# Patient Record
Sex: MALE | Race: OTHER | Employment: FULL TIME | URBAN - METROPOLITAN AREA
[De-identification: names, ages, dates, MRNs, and addresses within clinical notes are randomized per-mention and may not be internally consistent; named-entity substitution may affect disease eponyms.]

---

## 2017-01-11 ENCOUNTER — OFFICE VISIT (OUTPATIENT)
Dept: FAMILY MEDICINE CLINIC | Age: 52
End: 2017-01-11

## 2017-01-11 VITALS
BODY MASS INDEX: 31.32 KG/M2 | HEART RATE: 62 BPM | SYSTOLIC BLOOD PRESSURE: 132 MMHG | WEIGHT: 200 LBS | DIASTOLIC BLOOD PRESSURE: 86 MMHG | OXYGEN SATURATION: 98 %

## 2017-01-11 DIAGNOSIS — H66.001 ACUTE SUPPURATIVE OTITIS MEDIA OF RIGHT EAR WITHOUT SPONTANEOUS RUPTURE OF TYMPANIC MEMBRANE, RECURRENCE NOT SPECIFIED: ICD-10-CM

## 2017-01-11 DIAGNOSIS — J06.9 PROTRACTED URI: ICD-10-CM

## 2017-01-11 DIAGNOSIS — R13.14 PHARYNGOESOPHAGEAL DYSPHAGIA: Primary | ICD-10-CM

## 2017-01-11 PROCEDURE — 99213 OFFICE O/P EST LOW 20 MIN: CPT | Performed by: FAMILY MEDICINE

## 2017-01-11 RX ORDER — CEFUROXIME AXETIL 250 MG/1
250 TABLET ORAL 2 TIMES DAILY
Qty: 20 TABLET | Refills: 0 | Status: SHIPPED | OUTPATIENT
Start: 2017-01-11 | End: 2017-01-21

## 2017-01-11 RX ORDER — FLUTICASONE PROPIONATE 50 MCG
2 SPRAY, SUSPENSION (ML) NASAL DAILY
Qty: 1 BOTTLE | Refills: 1 | Status: SHIPPED | OUTPATIENT
Start: 2017-01-11 | End: 2019-03-26 | Stop reason: SDUPTHER

## 2017-01-24 ENCOUNTER — OFFICE VISIT (OUTPATIENT)
Dept: SLEEP MEDICINE | Age: 52
End: 2017-01-24

## 2017-01-24 VITALS
SYSTOLIC BLOOD PRESSURE: 130 MMHG | HEART RATE: 61 BPM | HEIGHT: 67 IN | BODY MASS INDEX: 31.08 KG/M2 | OXYGEN SATURATION: 98 % | DIASTOLIC BLOOD PRESSURE: 80 MMHG | WEIGHT: 198 LBS

## 2017-01-24 DIAGNOSIS — J30.9 ALLERGIC RHINITIS, UNSPECIFIED ALLERGIC RHINITIS TRIGGER, UNSPECIFIED RHINITIS SEASONALITY: Chronic | ICD-10-CM

## 2017-01-24 DIAGNOSIS — E66.9 NON MORBID OBESITY, UNSPECIFIED OBESITY TYPE: Chronic | ICD-10-CM

## 2017-01-24 DIAGNOSIS — R06.83 SNORING: ICD-10-CM

## 2017-01-24 DIAGNOSIS — G47.10 HYPERSOMNIA: Primary | ICD-10-CM

## 2017-01-24 DIAGNOSIS — J45.20 MILD INTERMITTENT ASTHMA WITHOUT COMPLICATION: Chronic | ICD-10-CM

## 2017-01-24 DIAGNOSIS — E11.9 CONTROLLED TYPE 2 DIABETES MELLITUS WITHOUT COMPLICATION, WITHOUT LONG-TERM CURRENT USE OF INSULIN (HCC): Chronic | ICD-10-CM

## 2017-01-24 PROCEDURE — 99244 OFF/OP CNSLTJ NEW/EST MOD 40: CPT | Performed by: INTERNAL MEDICINE

## 2017-01-24 ASSESSMENT — SLEEP AND FATIGUE QUESTIONNAIRES
HOW LIKELY ARE YOU TO NOD OFF OR FALL ASLEEP WHILE SITTING AND READING: 3
HOW LIKELY ARE YOU TO NOD OFF OR FALL ASLEEP WHEN YOU ARE A PASSENGER IN A CAR FOR AN HOUR WITHOUT A BREAK: 0
HOW LIKELY ARE YOU TO NOD OFF OR FALL ASLEEP IN A CAR, WHILE STOPPED FOR A FEW MINUTES IN TRAFFIC: 0
ESS TOTAL SCORE: 9
HOW LIKELY ARE YOU TO NOD OFF OR FALL ASLEEP WHILE SITTING AND TALKING TO SOMEONE: 1
NECK CIRCUMFERENCE (INCHES): 16.5
HOW LIKELY ARE YOU TO NOD OFF OR FALL ASLEEP WHILE SITTING QUIETLY AFTER LUNCH WITHOUT ALCOHOL: 0
HOW LIKELY ARE YOU TO NOD OFF OR FALL ASLEEP WHILE LYING DOWN TO REST IN THE AFTERNOON WHEN CIRCUMSTANCES PERMIT: 0
HOW LIKELY ARE YOU TO NOD OFF OR FALL ASLEEP WHILE WATCHING TV: 3
HOW LIKELY ARE YOU TO NOD OFF OR FALL ASLEEP WHILE SITTING INACTIVE IN A PUBLIC PLACE: 2

## 2017-01-24 ASSESSMENT — ENCOUNTER SYMPTOMS
VOMITING: 0
CHOKING: 0
ABDOMINAL DISTENTION: 0
ALLERGIC/IMMUNOLOGIC NEGATIVE: 1
SHORTNESS OF BREATH: 0
NAUSEA: 0
ABDOMINAL PAIN: 0
PHOTOPHOBIA: 0
RHINORRHEA: 0
EYE PAIN: 0
CHEST TIGHTNESS: 0
APNEA: 1

## 2017-01-27 ENCOUNTER — OFFICE VISIT (OUTPATIENT)
Dept: FAMILY MEDICINE CLINIC | Age: 52
End: 2017-01-27

## 2017-01-27 VITALS
HEART RATE: 71 BPM | WEIGHT: 196 LBS | SYSTOLIC BLOOD PRESSURE: 136 MMHG | OXYGEN SATURATION: 98 % | DIASTOLIC BLOOD PRESSURE: 70 MMHG | BODY MASS INDEX: 30.7 KG/M2

## 2017-01-27 DIAGNOSIS — L03.011 PARONYCHIA OF RIGHT MIDDLE FINGER: Primary | ICD-10-CM

## 2017-01-27 PROCEDURE — 99213 OFFICE O/P EST LOW 20 MIN: CPT | Performed by: FAMILY MEDICINE

## 2017-01-27 RX ORDER — AMOXICILLIN AND CLAVULANATE POTASSIUM 500; 125 MG/1; MG/1
1 TABLET, FILM COATED ORAL 3 TIMES DAILY
Qty: 21 TABLET | Refills: 0 | Status: SHIPPED | OUTPATIENT
Start: 2017-01-27 | End: 2017-02-03

## 2017-02-15 ENCOUNTER — TELEPHONE (OUTPATIENT)
Dept: FAMILY MEDICINE CLINIC | Age: 52
End: 2017-02-15

## 2017-02-15 ENCOUNTER — TELEPHONE (OUTPATIENT)
Dept: ORTHOPEDIC SURGERY | Age: 52
End: 2017-02-15

## 2017-02-15 DIAGNOSIS — R52 PAIN: Primary | ICD-10-CM

## 2017-02-17 ENCOUNTER — HOSPITAL ENCOUNTER (OUTPATIENT)
Dept: SLEEP MEDICINE | Age: 52
Discharge: HOME | End: 2017-02-18
Attending: INTERNAL MEDICINE | Admitting: INTERNAL MEDICINE

## 2017-02-17 DIAGNOSIS — G47.10 HYPERSOMNIA: ICD-10-CM

## 2017-02-17 DIAGNOSIS — R06.83 SNORING: ICD-10-CM

## 2017-02-17 PROCEDURE — 95810 POLYSOM 6/> YRS 4/> PARAM: CPT | Performed by: INTERNAL MEDICINE

## 2017-02-21 ENCOUNTER — OFFICE VISIT (OUTPATIENT)
Dept: ORTHOPEDIC SURGERY | Age: 52
End: 2017-02-21

## 2017-02-21 ENCOUNTER — TELEPHONE (OUTPATIENT)
Dept: SLEEP MEDICINE | Age: 52
End: 2017-02-21

## 2017-02-21 VITALS
DIASTOLIC BLOOD PRESSURE: 80 MMHG | SYSTOLIC BLOOD PRESSURE: 131 MMHG | WEIGHT: 195.99 LBS | HEART RATE: 67 BPM | BODY MASS INDEX: 30.76 KG/M2 | HEIGHT: 67 IN

## 2017-02-21 DIAGNOSIS — L03.011 PARONYCHIA OF FINGER OF RIGHT HAND: ICD-10-CM

## 2017-02-21 PROCEDURE — 99213 OFFICE O/P EST LOW 20 MIN: CPT | Performed by: ORTHOPAEDIC SURGERY

## 2017-02-24 ENCOUNTER — HOSPITAL ENCOUNTER (OUTPATIENT)
Dept: SLEEP MEDICINE | Age: 52
Discharge: HOME | End: 2017-02-25
Attending: INTERNAL MEDICINE | Admitting: INTERNAL MEDICINE

## 2017-02-24 DIAGNOSIS — G47.10 HYPERSOMNIA: ICD-10-CM

## 2017-02-24 DIAGNOSIS — R06.83 SNORING: ICD-10-CM

## 2017-02-24 PROCEDURE — 95811 POLYSOM 6/>YRS CPAP 4/> PARM: CPT | Performed by: INTERNAL MEDICINE

## 2017-03-06 ENCOUNTER — TELEPHONE (OUTPATIENT)
Dept: FAMILY MEDICINE CLINIC | Age: 52
End: 2017-03-06

## 2017-03-06 DIAGNOSIS — Z12.11 COLON CANCER SCREENING: Primary | ICD-10-CM

## 2017-03-08 ENCOUNTER — TELEPHONE (OUTPATIENT)
Dept: FAMILY MEDICINE CLINIC | Age: 52
End: 2017-03-08

## 2017-03-14 ENCOUNTER — TELEPHONE (OUTPATIENT)
Dept: SLEEP MEDICINE | Age: 52
End: 2017-03-14

## 2017-03-15 ENCOUNTER — OFFICE VISIT (OUTPATIENT)
Dept: FAMILY MEDICINE CLINIC | Age: 52
End: 2017-03-15

## 2017-03-15 VITALS
HEART RATE: 78 BPM | HEIGHT: 67 IN | SYSTOLIC BLOOD PRESSURE: 132 MMHG | DIASTOLIC BLOOD PRESSURE: 82 MMHG | WEIGHT: 191 LBS | OXYGEN SATURATION: 98 % | BODY MASS INDEX: 29.98 KG/M2 | RESPIRATION RATE: 12 BRPM | TEMPERATURE: 98 F

## 2017-03-15 DIAGNOSIS — J06.9 VIRAL URI WITH COUGH: Primary | ICD-10-CM

## 2017-03-15 DIAGNOSIS — J45.21 MILD INTERMITTENT ASTHMA WITH ACUTE EXACERBATION: ICD-10-CM

## 2017-03-15 PROCEDURE — 99213 OFFICE O/P EST LOW 20 MIN: CPT | Performed by: NURSE PRACTITIONER

## 2017-03-15 RX ORDER — ALBUTEROL SULFATE 90 UG/1
2 AEROSOL, METERED RESPIRATORY (INHALATION) EVERY 6 HOURS PRN
Qty: 1 INHALER | Refills: 0 | Status: SHIPPED | OUTPATIENT
Start: 2017-03-15 | End: 2017-05-17 | Stop reason: ALTCHOICE

## 2017-03-15 RX ORDER — ALBUTEROL SULFATE 90 UG/1
2 AEROSOL, METERED RESPIRATORY (INHALATION) EVERY 6 HOURS PRN
Qty: 1 INHALER | Refills: 5 | Status: SHIPPED | OUTPATIENT
Start: 2017-03-15 | End: 2018-04-04 | Stop reason: SDUPTHER

## 2017-03-15 RX ORDER — PROMETHAZINE HYDROCHLORIDE AND CODEINE PHOSPHATE 6.25; 1 MG/5ML; MG/5ML
5 SYRUP ORAL 4 TIMES DAILY PRN
Qty: 120 ML | Refills: 0 | Status: SHIPPED | OUTPATIENT
Start: 2017-03-15 | End: 2017-03-22

## 2017-03-15 ASSESSMENT — ENCOUNTER SYMPTOMS
COUGH: 1
CHEST TIGHTNESS: 0
SORE THROAT: 0
SHORTNESS OF BREATH: 0
WHEEZING: 0
RHINORRHEA: 0
SINUS PRESSURE: 1

## 2017-03-15 ASSESSMENT — PATIENT HEALTH QUESTIONNAIRE - PHQ9
2. FEELING DOWN, DEPRESSED OR HOPELESS: 0
SUM OF ALL RESPONSES TO PHQ9 QUESTIONS 1 & 2: 0
1. LITTLE INTEREST OR PLEASURE IN DOING THINGS: 0
SUM OF ALL RESPONSES TO PHQ QUESTIONS 1-9: 0

## 2017-03-16 ENCOUNTER — TELEPHONE (OUTPATIENT)
Dept: FAMILY MEDICINE CLINIC | Age: 52
End: 2017-03-16

## 2017-03-17 ENCOUNTER — TELEPHONE (OUTPATIENT)
Dept: FAMILY MEDICINE CLINIC | Age: 52
End: 2017-03-17

## 2017-04-18 DIAGNOSIS — J45.21 MILD INTERMITTENT ASTHMA WITH ACUTE EXACERBATION: ICD-10-CM

## 2017-04-27 ENCOUNTER — TELEPHONE (OUTPATIENT)
Dept: FAMILY MEDICINE CLINIC | Age: 52
End: 2017-04-27

## 2017-04-27 RX ORDER — BLOOD PRESSURE TEST KIT
KIT MISCELLANEOUS
Qty: 1 KIT | Refills: 0 | Status: SHIPPED | OUTPATIENT
Start: 2017-04-27

## 2017-05-02 ENCOUNTER — OFFICE VISIT (OUTPATIENT)
Dept: ORTHOPEDIC SURGERY | Age: 52
End: 2017-05-02

## 2017-05-02 VITALS — WEIGHT: 189 LBS | HEIGHT: 68 IN | BODY MASS INDEX: 28.64 KG/M2

## 2017-05-02 DIAGNOSIS — L03.011 PARONYCHIA OF FINGER OF RIGHT HAND: Primary | ICD-10-CM

## 2017-05-02 DIAGNOSIS — M79.609 PAIN DUE TO ONYCHOMYCOSIS OF NAIL: ICD-10-CM

## 2017-05-02 DIAGNOSIS — B35.1 PAIN DUE TO ONYCHOMYCOSIS OF NAIL: ICD-10-CM

## 2017-05-02 PROCEDURE — 99213 OFFICE O/P EST LOW 20 MIN: CPT | Performed by: ORTHOPAEDIC SURGERY

## 2017-05-11 ENCOUNTER — TELEPHONE (OUTPATIENT)
Dept: FAMILY MEDICINE CLINIC | Age: 52
End: 2017-05-11

## 2017-05-16 ENCOUNTER — OFFICE VISIT (OUTPATIENT)
Dept: SLEEP MEDICINE | Age: 52
End: 2017-05-16

## 2017-05-16 VITALS
DIASTOLIC BLOOD PRESSURE: 74 MMHG | WEIGHT: 194 LBS | SYSTOLIC BLOOD PRESSURE: 122 MMHG | HEIGHT: 68 IN | HEART RATE: 73 BPM | OXYGEN SATURATION: 97 % | BODY MASS INDEX: 29.4 KG/M2

## 2017-05-16 DIAGNOSIS — J45.20 MILD INTERMITTENT ASTHMA WITHOUT COMPLICATION: Chronic | ICD-10-CM

## 2017-05-16 DIAGNOSIS — G47.33 OBSTRUCTIVE SLEEP APNEA SYNDROME: Primary | ICD-10-CM

## 2017-05-16 DIAGNOSIS — E11.9 CONTROLLED TYPE 2 DIABETES MELLITUS WITHOUT COMPLICATION, WITHOUT LONG-TERM CURRENT USE OF INSULIN (HCC): Chronic | ICD-10-CM

## 2017-05-16 DIAGNOSIS — J30.9 ALLERGIC RHINITIS, UNSPECIFIED ALLERGIC RHINITIS TRIGGER, UNSPECIFIED RHINITIS SEASONALITY: Chronic | ICD-10-CM

## 2017-05-16 PROCEDURE — 99214 OFFICE O/P EST MOD 30 MIN: CPT | Performed by: NURSE PRACTITIONER

## 2017-05-16 ASSESSMENT — ENCOUNTER SYMPTOMS
ABDOMINAL PAIN: 0
RHINORRHEA: 0
SHORTNESS OF BREATH: 0
SINUS PRESSURE: 0
COUGH: 0
ABDOMINAL DISTENTION: 0
APNEA: 0

## 2017-05-16 ASSESSMENT — SLEEP AND FATIGUE QUESTIONNAIRES
HOW LIKELY ARE YOU TO NOD OFF OR FALL ASLEEP WHILE WATCHING TV: 2
HOW LIKELY ARE YOU TO NOD OFF OR FALL ASLEEP IN A CAR, WHILE STOPPED FOR A FEW MINUTES IN TRAFFIC: 0
HOW LIKELY ARE YOU TO NOD OFF OR FALL ASLEEP WHEN YOU ARE A PASSENGER IN A CAR FOR AN HOUR WITHOUT A BREAK: 0
HOW LIKELY ARE YOU TO NOD OFF OR FALL ASLEEP WHILE SITTING QUIETLY AFTER LUNCH WITHOUT ALCOHOL: 1
HOW LIKELY ARE YOU TO NOD OFF OR FALL ASLEEP WHILE LYING DOWN TO REST IN THE AFTERNOON WHEN CIRCUMSTANCES PERMIT: 1
ESS TOTAL SCORE: 7
HOW LIKELY ARE YOU TO NOD OFF OR FALL ASLEEP WHILE SITTING INACTIVE IN A PUBLIC PLACE: 1
HOW LIKELY ARE YOU TO NOD OFF OR FALL ASLEEP WHILE SITTING AND TALKING TO SOMEONE: 0
HOW LIKELY ARE YOU TO NOD OFF OR FALL ASLEEP WHILE SITTING AND READING: 2

## 2017-05-17 ENCOUNTER — TELEPHONE (OUTPATIENT)
Dept: ORTHOPEDIC SURGERY | Age: 52
End: 2017-05-17

## 2017-05-17 ENCOUNTER — OFFICE VISIT (OUTPATIENT)
Dept: FAMILY MEDICINE CLINIC | Age: 52
End: 2017-05-17

## 2017-05-17 ENCOUNTER — HOSPITAL ENCOUNTER (OUTPATIENT)
Dept: ENDOSCOPY | Age: 52
Discharge: HOME | End: 2017-05-17
Attending: INTERNAL MEDICINE | Admitting: INTERNAL MEDICINE

## 2017-05-17 VITALS
HEART RATE: 52 BPM | OXYGEN SATURATION: 98 % | RESPIRATION RATE: 14 BRPM | WEIGHT: 188.6 LBS | HEIGHT: 68 IN | SYSTOLIC BLOOD PRESSURE: 132 MMHG | DIASTOLIC BLOOD PRESSURE: 88 MMHG | BODY MASS INDEX: 28.58 KG/M2

## 2017-05-17 DIAGNOSIS — Z01.818 PREOP EXAMINATION: Primary | ICD-10-CM

## 2017-05-17 LAB
GLUCOSE BLD-MCNC: 104 MG/DL (ref 70–99)
GLUCOSE BLD-MCNC: 116 MG/DL (ref 70–99)
PERFORMED ON: ABNORMAL
PERFORMED ON: ABNORMAL

## 2017-05-17 PROCEDURE — 99242 OFF/OP CONSLTJ NEW/EST SF 20: CPT | Performed by: FAMILY MEDICINE

## 2017-05-17 PROCEDURE — 93000 ELECTROCARDIOGRAM COMPLETE: CPT | Performed by: FAMILY MEDICINE

## 2017-05-18 ENCOUNTER — TELEPHONE (OUTPATIENT)
Dept: PULMONOLOGY | Age: 52
End: 2017-05-18

## 2017-05-18 ENCOUNTER — TELEPHONE (OUTPATIENT)
Dept: SLEEP MEDICINE | Age: 52
End: 2017-05-18

## 2017-05-24 LAB — GLUCOSE BLD-MCNC: 128 MG/DL (ref 65–99)

## 2017-05-25 ENCOUNTER — TELEPHONE (OUTPATIENT)
Dept: ORTHOPEDIC SURGERY | Age: 52
End: 2017-05-25

## 2017-05-25 NOTE — LETTER
May 25, 2017     Patient: Erickson Salmon   YOB: 1965   Date of Surgery: 5/24/17 right hand       To Whom It May Concern: It is my medical opinion that Gabriela Collins should remain out of work until 5/27/17. If you have any questions or concerns, please don't hesitate to call. Sincerely,              Yaquelin Camargo.  Diana Dumont MD

## 2017-05-27 LAB
AMPICILLIN + SULBACTAM: ABNORMAL
AMPICILLIN: <=2
BACTERIA IDENTIFIED: ABNORMAL
CEFAZOLIN: <=8
CEFEPIME: <=4
CEFTRIAXONE: <=8
GENTAMICIN: <=4
LEVOFLOXACIN: <=2
Lab: <=4
MEROPENEM: <=1
MICROSCOPIC OBSERVATION: ABNORMAL
PIPERACILLIN + TAZOBACTAM: <=16
TOBRAMYCIN: <=4
TRIMETHOPRIM: ABNORMAL
VANCOMYCIN: 2

## 2017-05-30 ENCOUNTER — OFFICE VISIT (OUTPATIENT)
Dept: ORTHOPEDIC SURGERY | Age: 52
End: 2017-05-30

## 2017-05-30 ENCOUNTER — TELEPHONE (OUTPATIENT)
Dept: SLEEP MEDICINE | Age: 52
End: 2017-05-30

## 2017-05-30 VITALS
HEIGHT: 68 IN | DIASTOLIC BLOOD PRESSURE: 89 MMHG | WEIGHT: 190 LBS | HEART RATE: 71 BPM | SYSTOLIC BLOOD PRESSURE: 137 MMHG | BODY MASS INDEX: 28.79 KG/M2

## 2017-05-30 DIAGNOSIS — L03.011 PARONYCHIA OF FINGER OF RIGHT HAND: Primary | ICD-10-CM

## 2017-05-30 LAB — CLINICAL REPORT: NORMAL

## 2017-05-30 PROCEDURE — 99024 POSTOP FOLLOW-UP VISIT: CPT | Performed by: ORTHOPAEDIC SURGERY

## 2017-06-20 ENCOUNTER — TELEPHONE (OUTPATIENT)
Dept: FAMILY MEDICINE CLINIC | Age: 52
End: 2017-06-20

## 2017-06-26 ENCOUNTER — HOSPITAL ENCOUNTER (OUTPATIENT)
Dept: OTHER | Age: 52
Discharge: HOME | End: 2017-06-26
Attending: FAMILY MEDICINE | Admitting: FAMILY MEDICINE

## 2017-06-26 DIAGNOSIS — E78.00 PURE HYPERCHOLESTEROLEMIA: Chronic | ICD-10-CM

## 2017-06-26 DIAGNOSIS — E11.9 CONTROLLED TYPE 2 DIABETES MELLITUS WITHOUT COMPLICATION, WITHOUT LONG-TERM CURRENT USE OF INSULIN (HCC): Primary | Chronic | ICD-10-CM

## 2017-06-26 DIAGNOSIS — E11.9 CONTROLLED TYPE 2 DIABETES MELLITUS WITHOUT COMPLICATION, WITHOUT LONG-TERM CURRENT USE OF INSULIN (HCC): Chronic | ICD-10-CM

## 2017-06-26 DIAGNOSIS — Z12.5 SCREENING FOR PROSTATE CANCER: ICD-10-CM

## 2017-06-26 LAB
ANION GAP SERPL CALCULATED.3IONS-SCNC: 13 MMOL/L (ref 3–16)
BUN BLDV-MCNC: 11 MG/DL (ref 7–20)
CALCIUM SERPL-MCNC: 9.1 MG/DL (ref 8.3–10.6)
CHLORIDE BLD-SCNC: 103 MMOL/L (ref 99–110)
CHOLESTEROL, TOTAL: 189 MG/DL (ref 0–199)
CO2: 27 MMOL/L (ref 21–32)
CREAT SERPL-MCNC: 0.7 MG/DL (ref 0.9–1.3)
ESTIMATED AVERAGE GLUCOSE: 131.2 MG/DL
GFR AFRICAN AMERICAN: >60
GFR NON-AFRICAN AMERICAN: >60
GLUCOSE BLD-MCNC: 112 MG/DL (ref 70–99)
HBA1C MFR BLD: 6.2 %
HDLC SERPL-MCNC: 44 MG/DL (ref 40–60)
LDL CHOLESTEROL CALCULATED: 113 MG/DL
POTASSIUM SERPL-SCNC: 4.4 MMOL/L (ref 3.5–5.1)
PROSTATE SPECIFIC ANTIGEN: 0.41 NG/ML (ref 0–4)
SODIUM BLD-SCNC: 143 MMOL/L (ref 136–145)
TRIGL SERPL-MCNC: 162 MG/DL (ref 0–150)
VLDLC SERPL CALC-MCNC: 32 MG/DL

## 2017-06-29 ENCOUNTER — OFFICE VISIT (OUTPATIENT)
Dept: FAMILY MEDICINE CLINIC | Age: 52
End: 2017-06-29

## 2017-06-29 VITALS
HEART RATE: 59 BPM | OXYGEN SATURATION: 98 % | BODY MASS INDEX: 28.76 KG/M2 | WEIGHT: 189.8 LBS | HEIGHT: 68 IN | DIASTOLIC BLOOD PRESSURE: 68 MMHG | SYSTOLIC BLOOD PRESSURE: 116 MMHG

## 2017-06-29 DIAGNOSIS — J45.20 MILD INTERMITTENT ASTHMA WITHOUT COMPLICATION: Chronic | ICD-10-CM

## 2017-06-29 DIAGNOSIS — E11.9 CONTROLLED TYPE 2 DIABETES MELLITUS WITHOUT COMPLICATION, WITHOUT LONG-TERM CURRENT USE OF INSULIN (HCC): Primary | Chronic | ICD-10-CM

## 2017-06-29 DIAGNOSIS — E78.00 PURE HYPERCHOLESTEROLEMIA: Chronic | ICD-10-CM

## 2017-06-29 DIAGNOSIS — G47.33 OSA (OBSTRUCTIVE SLEEP APNEA): ICD-10-CM

## 2017-06-29 PROCEDURE — 99214 OFFICE O/P EST MOD 30 MIN: CPT | Performed by: NURSE PRACTITIONER

## 2017-06-29 RX ORDER — ATORVASTATIN CALCIUM 20 MG/1
20 TABLET, FILM COATED ORAL DAILY
Qty: 30 TABLET | Refills: 5 | Status: SHIPPED | OUTPATIENT
Start: 2017-06-29 | End: 2018-02-28

## 2017-07-11 DIAGNOSIS — E11.9 CONTROLLED TYPE 2 DIABETES MELLITUS WITHOUT COMPLICATION, WITHOUT LONG-TERM CURRENT USE OF INSULIN (HCC): Primary | Chronic | ICD-10-CM

## 2017-07-12 ENCOUNTER — OFFICE VISIT (OUTPATIENT)
Dept: ORTHOPEDIC SURGERY | Age: 52
End: 2017-07-12

## 2017-07-12 VITALS
WEIGHT: 189 LBS | DIASTOLIC BLOOD PRESSURE: 69 MMHG | BODY MASS INDEX: 28.64 KG/M2 | SYSTOLIC BLOOD PRESSURE: 117 MMHG | HEIGHT: 68 IN | RESPIRATION RATE: 16 BRPM | HEART RATE: 62 BPM

## 2017-07-12 DIAGNOSIS — M18.12 PRIMARY OSTEOARTHRITIS OF FIRST CARPOMETACARPAL JOINT OF LEFT HAND: Primary | ICD-10-CM

## 2017-07-12 PROCEDURE — 99213 OFFICE O/P EST LOW 20 MIN: CPT | Performed by: ORTHOPAEDIC SURGERY

## 2017-07-12 PROCEDURE — L3923 HFO WITHOUT JOINTS PRE CST: HCPCS | Performed by: ORTHOPAEDIC SURGERY

## 2017-07-12 PROCEDURE — 73130 X-RAY EXAM OF HAND: CPT | Performed by: ORTHOPAEDIC SURGERY

## 2017-07-12 RX ORDER — SIMVASTATIN 10 MG
TABLET ORAL
COMMUNITY
Start: 2017-05-14 | End: 2018-05-19

## 2017-07-12 RX ORDER — OMEPRAZOLE 20 MG/1
CAPSULE, DELAYED RELEASE ORAL
COMMUNITY
Start: 2017-05-17 | End: 2019-03-22 | Stop reason: ALTCHOICE

## 2017-07-12 RX ORDER — IBUPROFEN 200 MG
200 TABLET ORAL EVERY 6 HOURS PRN
COMMUNITY
End: 2018-08-01

## 2017-08-01 ENCOUNTER — TELEPHONE (OUTPATIENT)
Dept: FAMILY MEDICINE CLINIC | Age: 52
End: 2017-08-01

## 2017-08-04 ENCOUNTER — TELEPHONE (OUTPATIENT)
Dept: FAMILY MEDICINE CLINIC | Age: 52
End: 2017-08-04

## 2017-08-08 ENCOUNTER — HOSPITAL ENCOUNTER (OUTPATIENT)
Dept: OTHER | Age: 52
Discharge: HOME | End: 2017-08-08
Attending: INTERNAL MEDICINE | Admitting: INTERNAL MEDICINE

## 2017-08-09 LAB — H PYLORI ANTIGEN STOOL: NEGATIVE

## 2017-08-15 ENCOUNTER — OFFICE VISIT (OUTPATIENT)
Dept: SLEEP MEDICINE | Age: 52
End: 2017-08-15

## 2017-08-15 VITALS
OXYGEN SATURATION: 99 % | HEIGHT: 68 IN | DIASTOLIC BLOOD PRESSURE: 78 MMHG | WEIGHT: 193 LBS | BODY MASS INDEX: 29.25 KG/M2 | HEART RATE: 63 BPM | SYSTOLIC BLOOD PRESSURE: 118 MMHG

## 2017-08-15 DIAGNOSIS — J45.20 MILD INTERMITTENT ASTHMA WITHOUT COMPLICATION: Chronic | ICD-10-CM

## 2017-08-15 DIAGNOSIS — J30.9 ALLERGIC RHINITIS, UNSPECIFIED ALLERGIC RHINITIS TRIGGER, UNSPECIFIED RHINITIS SEASONALITY: Chronic | ICD-10-CM

## 2017-08-15 DIAGNOSIS — E11.9 CONTROLLED TYPE 2 DIABETES MELLITUS WITHOUT COMPLICATION, WITHOUT LONG-TERM CURRENT USE OF INSULIN (HCC): Chronic | ICD-10-CM

## 2017-08-15 DIAGNOSIS — E66.9 NON MORBID OBESITY, UNSPECIFIED OBESITY TYPE: Chronic | ICD-10-CM

## 2017-08-15 DIAGNOSIS — G47.33 OBSTRUCTIVE SLEEP APNEA SYNDROME: Primary | Chronic | ICD-10-CM

## 2017-08-15 PROCEDURE — 99214 OFFICE O/P EST MOD 30 MIN: CPT | Performed by: NURSE PRACTITIONER

## 2017-08-15 ASSESSMENT — SLEEP AND FATIGUE QUESTIONNAIRES
HOW LIKELY ARE YOU TO NOD OFF OR FALL ASLEEP IN A CAR, WHILE STOPPED FOR A FEW MINUTES IN TRAFFIC: 0
HOW LIKELY ARE YOU TO NOD OFF OR FALL ASLEEP WHEN YOU ARE A PASSENGER IN A CAR FOR AN HOUR WITHOUT A BREAK: 0
HOW LIKELY ARE YOU TO NOD OFF OR FALL ASLEEP WHILE WATCHING TV: 2
ESS TOTAL SCORE: 6
HOW LIKELY ARE YOU TO NOD OFF OR FALL ASLEEP WHILE SITTING QUIETLY AFTER LUNCH WITHOUT ALCOHOL: 0
HOW LIKELY ARE YOU TO NOD OFF OR FALL ASLEEP WHILE SITTING INACTIVE IN A PUBLIC PLACE: 1
HOW LIKELY ARE YOU TO NOD OFF OR FALL ASLEEP WHILE SITTING AND TALKING TO SOMEONE: 0
HOW LIKELY ARE YOU TO NOD OFF OR FALL ASLEEP WHILE SITTING AND READING: 2
HOW LIKELY ARE YOU TO NOD OFF OR FALL ASLEEP WHILE LYING DOWN TO REST IN THE AFTERNOON WHEN CIRCUMSTANCES PERMIT: 1

## 2017-08-15 ASSESSMENT — ENCOUNTER SYMPTOMS
SHORTNESS OF BREATH: 0
ABDOMINAL DISTENTION: 0
COUGH: 0
RHINORRHEA: 0
APNEA: 0
SINUS PRESSURE: 0
ABDOMINAL PAIN: 0

## 2017-11-22 RX ORDER — LANCETS 28 GAUGE
EACH MISCELLANEOUS
Qty: 100 EACH | Refills: 4 | Status: SHIPPED | OUTPATIENT
Start: 2017-11-22 | End: 2018-04-13 | Stop reason: SDUPTHER

## 2017-11-22 NOTE — TELEPHONE ENCOUNTER
Last OV  8/15/17    Last refill   4/12/16    # 100      R 5  Lab Results   Component Value Date    LABA1C 6.2 06/26/2017     Lab Results   Component Value Date    .2 06/26/2017

## 2017-12-15 DIAGNOSIS — E11.9 DIABETES TYPE 2, CONTROLLED (HCC): ICD-10-CM

## 2017-12-15 RX ORDER — LISINOPRIL 5 MG/1
TABLET ORAL
Qty: 30 TABLET | Refills: 10 | Status: SHIPPED | OUTPATIENT
Start: 2017-12-15 | End: 2018-02-28

## 2017-12-15 NOTE — TELEPHONE ENCOUNTER
Last OV  6/29/17    Last refill   12/8/16    # 30      R 11  Lab Results   Component Value Date     06/26/2017    K 4.4 06/26/2017     06/26/2017    CO2 27 06/26/2017    BUN 11 06/26/2017    CREATININE 0.7 (L) 06/26/2017    GLUCOSE 112 (H) 06/26/2017    CALCIUM 9.1 06/26/2017    PROT 6.7 06/09/2016    LABALBU 4.2 06/09/2016    BILITOT 0.6 06/09/2016    ALKPHOS 74 06/09/2016    AST 15 06/09/2016    ALT 18 06/09/2016    LABGLOM >60 06/26/2017    GFRAA >60 06/26/2017    AGRATIO 1.7 06/09/2016    GLOB 2.5 06/09/2016

## 2018-01-03 ENCOUNTER — OFFICE VISIT (OUTPATIENT)
Dept: FAMILY MEDICINE CLINIC | Age: 53
End: 2018-01-03

## 2018-01-03 VITALS
DIASTOLIC BLOOD PRESSURE: 94 MMHG | RESPIRATION RATE: 14 BRPM | BODY MASS INDEX: 29.24 KG/M2 | SYSTOLIC BLOOD PRESSURE: 134 MMHG | HEIGHT: 68 IN | HEART RATE: 56 BPM | WEIGHT: 192.9 LBS | OXYGEN SATURATION: 99 %

## 2018-01-03 DIAGNOSIS — E11.9 DIABETES MELLITUS WITHOUT COMPLICATION (HCC): ICD-10-CM

## 2018-01-03 DIAGNOSIS — J45.20 MILD INTERMITTENT ASTHMA WITHOUT COMPLICATION: Chronic | ICD-10-CM

## 2018-01-03 DIAGNOSIS — G47.33 OSA (OBSTRUCTIVE SLEEP APNEA): ICD-10-CM

## 2018-01-03 DIAGNOSIS — E78.00 PURE HYPERCHOLESTEROLEMIA: Primary | Chronic | ICD-10-CM

## 2018-01-03 PROCEDURE — 1036F TOBACCO NON-USER: CPT | Performed by: FAMILY MEDICINE

## 2018-01-03 PROCEDURE — G8427 DOCREV CUR MEDS BY ELIG CLIN: HCPCS | Performed by: FAMILY MEDICINE

## 2018-01-03 PROCEDURE — 3046F HEMOGLOBIN A1C LEVEL >9.0%: CPT | Performed by: FAMILY MEDICINE

## 2018-01-03 PROCEDURE — 99214 OFFICE O/P EST MOD 30 MIN: CPT | Performed by: FAMILY MEDICINE

## 2018-01-03 PROCEDURE — G8417 CALC BMI ABV UP PARAM F/U: HCPCS | Performed by: FAMILY MEDICINE

## 2018-01-03 PROCEDURE — G8484 FLU IMMUNIZE NO ADMIN: HCPCS | Performed by: FAMILY MEDICINE

## 2018-01-03 PROCEDURE — 3017F COLORECTAL CA SCREEN DOC REV: CPT | Performed by: FAMILY MEDICINE

## 2018-01-03 RX ORDER — SIMVASTATIN 20 MG
20 TABLET ORAL EVERY EVENING
Qty: 30 TABLET | Refills: 5 | Status: SHIPPED | OUTPATIENT
Start: 2018-01-03 | End: 2018-11-17 | Stop reason: SDUPTHER

## 2018-01-03 RX ORDER — LISINOPRIL 10 MG/1
10 TABLET ORAL DAILY
Qty: 30 TABLET | Refills: 5 | Status: SHIPPED | OUTPATIENT
Start: 2018-01-03 | End: 2018-08-07 | Stop reason: SDUPTHER

## 2018-01-03 NOTE — PROGRESS NOTES
Keturah Martinez is a 46 y.o. male. HPI: Here with wife for complex medical visit. Has been having left hemicranial headaches off-and-on for last several days and weeks  More forgetful, forgetting things that he told someone and forgetting things that he's been told to do  Has been less active and drinking more alcohol over the holidays  Denies any paresthesias open sores in his feet  Has resumed exercising just this past week   needs a new glucometer  Meds, vitamins and allergies reviewed with pt    ROS: No TIA's or unusual headaches, no dysphagia. No prolonged cough. No dyspnea or chest pain on exertion. No abdominal pain, change in bowel habits, black or bloody stools. No urinary tract symptoms. No new or unusual musculoskeletal symptoms. Prior to Visit Medications    Medication Sig Taking? Authorizing Provider   lisinopril (PRINIVIL;ZESTRIL) 5 MG tablet TAKE 1 TABLET BY MOUTH ONE TIME A DAY  Yes Carlos Kauffman MD   FREESTYLE LANCETS MISC USE ONE TIME DAILY Yes Carlos Kauffman MD   ibuprofen (ADVIL;MOTRIN) 200 MG tablet Take 200 mg by mouth every 6 hours as needed for Pain Yes Historical Provider, MD   omeprazole (PRILOSEC) 20 MG delayed release capsule  Yes Historical Provider, MD   glucose blood VI test strips (FREESTYLE LITE) strip 1 each by In Vitro route 2 times daily Yes Carlos Kauffman MD   atorvastatin (LIPITOR) 20 MG tablet Take 1 tablet by mouth daily Yes Darrel Green CNP   Blood Pressure KIT Please dispense one BP kit Yes Darrel Green CNP   albuterol sulfate HFA (VENTOLIN HFA) 108 (90 BASE) MCG/ACT inhaler Inhale 2 puffs into the lungs every 6 hours as needed for Wheezing Yes Carlos Kauffman MD   UNABLE TO FIND Per patient He is not on any OTC medication at this time.  Yes Historical Provider, MD   fluticasone (FLONASE) 50 MCG/ACT nasal spray 2 sprays by Nasal route daily Yes Shelby Bettencourt MD   metFORMIN (GLUCOPHAGE) 500 MG tablet TAKE 1 TABLET BY MOUTH TWO TIMES A DAY Results   Component Value Date    CHOL 189 06/26/2017    CHOL 162 06/09/2016    CHOL 173 12/02/2015     Lab Results   Component Value Date    TRIG 162 (H) 06/26/2017    TRIG 182 (H) 06/09/2016    TRIG 169 (H) 12/02/2015     Lab Results   Component Value Date    HDL 44 06/26/2017    HDL 38 (L) 06/09/2016    HDL 43 12/02/2015     Lab Results   Component Value Date    LDLCALC 113 (H) 06/26/2017    LDLCALC 88 06/09/2016    LDLCALC 96 12/02/2015     Lab Results   Component Value Date    LABVLDL 32 06/26/2017    LABVLDL 36 06/09/2016    LABVLDL 34 12/02/2015     No results found for: CHOLHDLRATIO     A1c 6.7  ASSESSMENT/PLAN:  1. Pure hypercholesterolemia  Will stop Lipitor and go back to Zocor which she tolerated better    2. Mild intermittent asthma without complication  Stable not addressed today    3. RENA (obstructive sleep apnea)  And thin the CPAP and exercising    4.  Diabetes mellitus without complication ([facility] [address]  S5S is up today but he thinks he can control his diet and exercise we'll make no medication changes bring back in 3-4 months  Reminded him to get an eye exam yearly  Provided him with a free glucometer sample    #5 hypertension borderline today  We'll increase lisinopril from 5-10    Spent 30 minutes with patient and his wife, greater than 50% of time discussing healthy lifestyle and corning his care  Return to office in 3 months at that time we will repeat blood work

## 2018-02-28 ENCOUNTER — OFFICE VISIT (OUTPATIENT)
Dept: FAMILY MEDICINE CLINIC | Age: 53
End: 2018-02-28

## 2018-02-28 VITALS
HEART RATE: 59 BPM | OXYGEN SATURATION: 98 % | WEIGHT: 192.2 LBS | SYSTOLIC BLOOD PRESSURE: 120 MMHG | BODY MASS INDEX: 29.23 KG/M2 | TEMPERATURE: 97.9 F | DIASTOLIC BLOOD PRESSURE: 80 MMHG | RESPIRATION RATE: 16 BRPM

## 2018-02-28 DIAGNOSIS — J04.0 LARYNGITIS: ICD-10-CM

## 2018-02-28 DIAGNOSIS — J00 NASOPHARYNGITIS ACUTE: Primary | ICD-10-CM

## 2018-02-28 PROCEDURE — G8427 DOCREV CUR MEDS BY ELIG CLIN: HCPCS | Performed by: NURSE PRACTITIONER

## 2018-02-28 PROCEDURE — 99213 OFFICE O/P EST LOW 20 MIN: CPT | Performed by: NURSE PRACTITIONER

## 2018-02-28 PROCEDURE — G8484 FLU IMMUNIZE NO ADMIN: HCPCS | Performed by: NURSE PRACTITIONER

## 2018-02-28 PROCEDURE — G8417 CALC BMI ABV UP PARAM F/U: HCPCS | Performed by: NURSE PRACTITIONER

## 2018-02-28 PROCEDURE — 3017F COLORECTAL CA SCREEN DOC REV: CPT | Performed by: NURSE PRACTITIONER

## 2018-02-28 PROCEDURE — 1036F TOBACCO NON-USER: CPT | Performed by: NURSE PRACTITIONER

## 2018-02-28 ASSESSMENT — ENCOUNTER SYMPTOMS
VOICE CHANGE: 1
SORE THROAT: 1
COUGH: 1
RHINORRHEA: 0
WHEEZING: 0
SINUS PAIN: 1
SHORTNESS OF BREATH: 0
SINUS PRESSURE: 1
CHEST TIGHTNESS: 0

## 2018-02-28 NOTE — PROGRESS NOTES
Yes Brandee Webber MD   glucose monitoring kit (FREESTYLE) monitoring kit 1 kit by Does not apply route daily as needed. Yes Brandee Webber MD       Patient's past medical, surgical, social and family histories were reviewed and updated as appropriate. Review of Systems   Constitutional: Positive for chills and fatigue. Negative for fever. HENT: Positive for congestion, ear pain (L), postnasal drip, sinus pain, sinus pressure, sore throat and voice change (hoarse voice). Negative for rhinorrhea. Respiratory: Positive for cough (relate to pnd). Negative for chest tightness, shortness of breath and wheezing. Neurological: Positive for headaches. Physical Exam   Constitutional: He is oriented to person, place, and time. He appears well-developed and well-nourished. HENT:   Right Ear: Tympanic membrane normal.   Left Ear: Tympanic membrane normal.   Nose: Nose normal.   Mouth/Throat: Uvula is midline and mucous membranes are normal. Posterior oropharyngeal erythema present. Cardiovascular: Normal rate, regular rhythm and normal heart sounds. Pulmonary/Chest: Effort normal and breath sounds normal.   Lymphadenopathy:     He has no cervical adenopathy. Neurological: He is alert and oriented to person, place, and time. Skin: Skin is warm and dry. Vitals reviewed. /80 (Site: Left Arm, Position: Sitting, Cuff Size: Small Adult)   Pulse 59   Temp 97.9 °F (36.6 °C) (Tympanic)   Resp 16   Wt 192 lb 3.2 oz (87.2 kg)   SpO2 98%   BMI 29.23 kg/m²       Assessment/Plan    1. Nasopharyngitis acute  2. Laryngitis  Symtomatic treatment: Tylenol / Advil, rest, salt water gargles, voice rest, increase fluids. May also use: Coricidin HBP (per orders)  Can make appointment of symptoms worsen or fail to improve over the next 3-4 days. Most viral illnesses last 7-10 days, and antibiotics do not help.   See pt instructions  Work note given  Discussed use, benefit, and side effects of prescribed

## 2018-02-28 NOTE — PATIENT INSTRUCTIONS
Patient Education        Viral Respiratory Infection: Care Instructions  Your Care Instructions    Viruses are very small organisms. They grow in number after they enter your body. There are many types that cause different illnesses, such as colds and the mumps. The symptoms of a viral respiratory infection often start quickly. They include a fever, sore throat, and runny nose. You may also just not feel well. Or you may not want to eat much. Most viral respiratory infections are not serious. They usually get better with time and self-care. Antibiotics are not used to treat a viral infection. That's because antibiotics will not help cure a viral illness. In some cases, antiviral medicine can help your body fight a serious viral infection. Follow-up care is a key part of your treatment and safety. Be sure to make and go to all appointments, and call your doctor if you are having problems. It's also a good idea to know your test results and keep a list of the medicines you take. How can you care for yourself at home? · Rest as much as possible until you feel better. · Be safe with medicines. Take your medicine exactly as prescribed. Call your doctor if you think you are having a problem with your medicine. You will get more details on the specific medicine your doctor prescribes. · Take an over-the-counter pain medicine, such as acetaminophen (Tylenol), ibuprofen (Advil, Motrin), or naproxen (Aleve), as needed for pain and fever. Read and follow all instructions on the label. Do not give aspirin to anyone younger than 20. It has been linked to Reye syndrome, a serious illness. · Drink plenty of fluids, enough so that your urine is light yellow or clear like water. Hot fluids, such as tea or soup, may help relieve congestion in your nose and throat. If you have kidney, heart, or liver disease and have to limit fluids, talk with your doctor before you increase the amount of fluids you drink.   · Try to clear mucus from your lungs by breathing deeply and coughing. · Gargle with warm salt water once an hour. This can help reduce swelling and throat pain. Use 1 teaspoon of salt mixed in 1 cup of warm water. · Do not smoke or allow others to smoke around you. If you need help quitting, talk to your doctor about stop-smoking programs and medicines. These can increase your chances of quitting for good. To avoid spreading the virus  · Cough or sneeze into a tissue. Then throw the tissue away. · If you don't have a tissue, use your hand to cover your cough or sneeze. Then clean your hand. You can also cough into your sleeve. · Wash your hands often. Use soap and warm water. Wash for 15 to 20 seconds each time. · If you don't have soap and water near you, you can clean your hands with alcohol wipes or gel. When should you call for help? Call your doctor now or seek immediate medical care if:  ? · You have a new or higher fever. ? · Your fever lasts more than 48 hours. ? · You have trouble breathing. ? · You have a fever with a stiff neck or a severe headache. ? · You are sensitive to light. ? · You feel very sleepy or confused. ? Watch closely for changes in your health, and be sure to contact your doctor if:  ? · You do not get better as expected. Where can you learn more? Go to https://Rooster TeethpeCBTec.Wipster. org and sign in to your NitroSecurity account. Enter S928 in the [facility] box to learn more about \"Viral Respiratory Infection: Care Instructions. \"     If you do not have an account, please click on the \"Sign Up Now\" link. Current as of: May 12, 2017  Content Version: 11.5  © 8733-5776 [employer]. Care instructions adapted under license by [facility] ([facility]). If you have questions about a medical condition or this instruction, always ask your healthcare professional. [address] any warranty or liability for your use of this information.        Patient Education        Laryngitis: Care Instructions  Your Care Instructions    Laryngitis is an inflammation of the voice box (larynx) that causes your voice to become raspy or hoarse. It can be short-lived or long-lasting. Most of the time, laryngitis comes on quickly and lasts as long as 2 weeks. It is caused by overuse, irritation, or infection of the vocal cords inside the larynx. Some of the most common causes are a cold, the flu, or allergies. Loud talking, shouting, cheering, or singing also can cause laryngitis. Stomach acid that backs up into the throat also can make you lose your voice. Resting your voice and taking other steps at home can help you get your voice back. Follow-up care is a key part of your treatment and safety. Be sure to make and go to all appointments, and call your doctor if you are having problems. It's also a good idea to know your test results and keep a list of the medicines you take. How can you care for yourself at home? · Follow your doctor's directions for treating the condition that caused you to lose your voice. If your doctor prescribed antibiotics, take them as directed. Do not stop taking them just because you feel better. You need to take the full course of antibiotics. · Before you use cough and cold medicines, check the label. They may not be safe for young children or for people with certain health problems. · Try to keep stomach acid from backing up into your throat. Do not eat just before bedtime. Reduce the amount of coffee and alcohol you drink, and eat healthy foods. Taking over-the-counter acid reducers can help when these steps are not enough. In some cases, you may need prescription medicine. · Rest your voice. You do not have to stop speaking, but use your voice as little as possible. Speak softly but do not whisper; whispering can bother your larynx more than speaking softly. Avoid talking on the telephone or trying to speak loudly.   · Try not to clear your throat. This can cause more irritation of your larynx. Take an over-the-counter cough suppressant (if your doctor recommends it) if you have a dry cough that does not produce mucus. · Do not smoke or allow others to smoke around you. If you need help quitting, talk to your doctor about stop-smoking programs and medicines. These can increase your chances of quitting for good. · Use a humidifier or vaporizer to add moisture to your bedroom. Humidity helps to thin the mucus in the nasal membranes that causes stuffiness or postnasal drip. Follow the directions for cleaning the machine. · Drink plenty of fluids, enough so that your urine is light yellow or clear like water. If you have kidney, heart, or liver disease and have to limit fluids, talk with your doctor before you increase the amount of fluids you drink. · Use saline (saltwater) nasal washes to help keep your nasal passages open and wash out mucus and bacteria. You can buy saline nose drops at a grocery store or drugstore. Or, you can make your own at home by mixing ½ teaspoon salt, 1 cup water (at room temperature), and ½ teaspoon baking soda. If you make your own, fill a bulb syringe with the solution, insert the tip into your nostril, and squeeze gently. Taliia Loveland your nose. When should you call for help? Call 911 anytime you think you may need emergency care. For example, call if:  ? · You have trouble breathing. ?Call your doctor now or seek immediate medical care if:  ? · You have new or worse pain. ? · You have trouble swallowing. ? Watch closely for changes in your health, and be sure to contact your doctor if:  ? · You do not get better as expected. Where can you learn more? Go to https://Quora.[employer]raVir [employer]aceuticals. org and sign in to your Youxiduo account. Enter J345 in the Betterific box to learn more about \"Laryngitis: Care Instructions. \"     If you do not have an account, please click on the \"Sign Up Now\" link.   Current as

## 2018-02-28 NOTE — LETTER
[address] [address]  Phone: [phone]  Fax: [phone]    Regulo Carrington CNP        February 28, 2018     Patient: Christine Valles   YOB: 1965   Date of Visit: 2/28/2018       To Whom it May Concern:    Eugenie Townsend was seen in my office on 2/28/2018. He may return to work on 3/2/18. If you have any questions or concerns, please don't hesitate to call.     Sincerely,         Regulo Carrington CNP

## 2018-03-16 DIAGNOSIS — E11.9 DIABETES TYPE 2, CONTROLLED (HCC): ICD-10-CM

## 2018-04-04 ENCOUNTER — OFFICE VISIT (OUTPATIENT)
Dept: FAMILY MEDICINE CLINIC | Age: 53
End: 2018-04-04

## 2018-04-04 VITALS
WEIGHT: 186 LBS | RESPIRATION RATE: 14 BRPM | OXYGEN SATURATION: 99 % | DIASTOLIC BLOOD PRESSURE: 76 MMHG | BODY MASS INDEX: 29.19 KG/M2 | HEIGHT: 67 IN | TEMPERATURE: 98.6 F | SYSTOLIC BLOOD PRESSURE: 122 MMHG

## 2018-04-04 DIAGNOSIS — J45.21 MILD INTERMITTENT ASTHMA WITH ACUTE EXACERBATION: ICD-10-CM

## 2018-04-04 DIAGNOSIS — E11.9 CONTROLLED TYPE 2 DIABETES MELLITUS WITHOUT COMPLICATION, WITHOUT LONG-TERM CURRENT USE OF INSULIN (HCC): Chronic | ICD-10-CM

## 2018-04-04 DIAGNOSIS — J18.9 COMMUNITY ACQUIRED PNEUMONIA, UNSPECIFIED LATERALITY: Primary | ICD-10-CM

## 2018-04-04 PROBLEM — L03.011 CELLULITIS OF FINGER OF RIGHT HAND: Status: ACTIVE | Noted: 2017-05-24

## 2018-04-04 LAB — HBA1C MFR BLD: 6.4 %

## 2018-04-04 PROCEDURE — G8427 DOCREV CUR MEDS BY ELIG CLIN: HCPCS | Performed by: FAMILY MEDICINE

## 2018-04-04 PROCEDURE — 83036 HEMOGLOBIN GLYCOSYLATED A1C: CPT | Performed by: FAMILY MEDICINE

## 2018-04-04 PROCEDURE — 3017F COLORECTAL CA SCREEN DOC REV: CPT | Performed by: FAMILY MEDICINE

## 2018-04-04 PROCEDURE — 1036F TOBACCO NON-USER: CPT | Performed by: FAMILY MEDICINE

## 2018-04-04 PROCEDURE — G8417 CALC BMI ABV UP PARAM F/U: HCPCS | Performed by: FAMILY MEDICINE

## 2018-04-04 PROCEDURE — 99213 OFFICE O/P EST LOW 20 MIN: CPT | Performed by: FAMILY MEDICINE

## 2018-04-04 PROCEDURE — 3044F HG A1C LEVEL LT 7.0%: CPT | Performed by: FAMILY MEDICINE

## 2018-04-04 RX ORDER — LEVOFLOXACIN 500 MG/1
TABLET, FILM COATED ORAL
COMMUNITY
Start: 2018-03-30 | End: 2018-05-19

## 2018-04-04 RX ORDER — ALBUTEROL SULFATE 90 UG/1
2 AEROSOL, METERED RESPIRATORY (INHALATION) EVERY 6 HOURS PRN
Qty: 1 INHALER | Refills: 5 | Status: SHIPPED | OUTPATIENT
Start: 2018-04-04 | End: 2019-05-16 | Stop reason: SDUPTHER

## 2018-04-04 RX ORDER — FLUTICASONE PROPIONATE 50 MCG
2 SPRAY, SUSPENSION (ML) NASAL DAILY
Qty: 1 BOTTLE | Refills: 3 | Status: SHIPPED | OUTPATIENT
Start: 2018-04-04 | End: 2018-05-21 | Stop reason: SDUPTHER

## 2018-04-13 DIAGNOSIS — E11.9 CONTROLLED TYPE 2 DIABETES MELLITUS WITHOUT COMPLICATION, WITHOUT LONG-TERM CURRENT USE OF INSULIN (HCC): Chronic | ICD-10-CM

## 2018-04-13 RX ORDER — LANCETS 28 GAUGE
EACH MISCELLANEOUS
Qty: 100 EACH | Refills: 4 | Status: SHIPPED | OUTPATIENT
Start: 2018-04-13 | End: 2020-06-08 | Stop reason: SDUPTHER

## 2018-04-13 RX ORDER — BLOOD-GLUCOSE METER
KIT MISCELLANEOUS
Qty: 1 KIT | Refills: 0 | Status: SHIPPED | OUTPATIENT
Start: 2018-04-13 | End: 2019-11-19 | Stop reason: SDUPTHER

## 2018-05-16 ENCOUNTER — TELEPHONE (OUTPATIENT)
Dept: SLEEP MEDICINE | Age: 53
End: 2018-05-16

## 2018-05-18 ENCOUNTER — TELEPHONE (OUTPATIENT)
Dept: SLEEP MEDICINE | Age: 53
End: 2018-05-18

## 2018-05-19 ENCOUNTER — OFFICE VISIT (OUTPATIENT)
Dept: FAMILY MEDICINE CLINIC | Age: 53
End: 2018-05-19

## 2018-05-19 VITALS
DIASTOLIC BLOOD PRESSURE: 80 MMHG | HEIGHT: 67 IN | WEIGHT: 189 LBS | BODY MASS INDEX: 29.66 KG/M2 | TEMPERATURE: 96.1 F | SYSTOLIC BLOOD PRESSURE: 124 MMHG

## 2018-05-19 DIAGNOSIS — H10.33 ACUTE CONJUNCTIVITIS OF BOTH EYES, UNSPECIFIED ACUTE CONJUNCTIVITIS TYPE: ICD-10-CM

## 2018-05-19 DIAGNOSIS — J40 BRONCHITIS: Primary | ICD-10-CM

## 2018-05-19 PROCEDURE — G8417 CALC BMI ABV UP PARAM F/U: HCPCS | Performed by: FAMILY MEDICINE

## 2018-05-19 PROCEDURE — 1036F TOBACCO NON-USER: CPT | Performed by: FAMILY MEDICINE

## 2018-05-19 PROCEDURE — G8427 DOCREV CUR MEDS BY ELIG CLIN: HCPCS | Performed by: FAMILY MEDICINE

## 2018-05-19 PROCEDURE — 3017F COLORECTAL CA SCREEN DOC REV: CPT | Performed by: FAMILY MEDICINE

## 2018-05-19 PROCEDURE — 99213 OFFICE O/P EST LOW 20 MIN: CPT | Performed by: FAMILY MEDICINE

## 2018-05-19 RX ORDER — AZITHROMYCIN 250 MG/1
TABLET, FILM COATED ORAL
Qty: 1 PACKET | Refills: 0 | Status: SHIPPED | OUTPATIENT
Start: 2018-05-19 | End: 2018-05-23

## 2018-05-19 RX ORDER — KETOTIFEN FUMARATE 0.35 MG/ML
1 SOLUTION/ DROPS OPHTHALMIC 2 TIMES DAILY
Qty: 5 ML | Refills: 0 | Status: SHIPPED | OUTPATIENT
Start: 2018-05-19 | End: 2018-05-29

## 2018-05-19 RX ORDER — PREDNISONE 20 MG/1
40 TABLET ORAL DAILY
Qty: 10 TABLET | Refills: 0 | Status: SHIPPED | OUTPATIENT
Start: 2018-05-19 | End: 2018-05-24

## 2018-05-21 ENCOUNTER — OFFICE VISIT (OUTPATIENT)
Dept: SLEEP MEDICINE | Age: 53
End: 2018-05-21

## 2018-05-21 VITALS
HEIGHT: 67 IN | SYSTOLIC BLOOD PRESSURE: 136 MMHG | DIASTOLIC BLOOD PRESSURE: 70 MMHG | OXYGEN SATURATION: 97 % | BODY MASS INDEX: 29.66 KG/M2 | WEIGHT: 189 LBS | HEART RATE: 72 BPM

## 2018-05-21 DIAGNOSIS — G47.33 OBSTRUCTIVE SLEEP APNEA SYNDROME: Primary | Chronic | ICD-10-CM

## 2018-05-21 DIAGNOSIS — E66.9 NON MORBID OBESITY, UNSPECIFIED OBESITY TYPE: Chronic | ICD-10-CM

## 2018-05-21 DIAGNOSIS — J45.20 MILD INTERMITTENT ASTHMA WITHOUT COMPLICATION: Chronic | ICD-10-CM

## 2018-05-21 DIAGNOSIS — E11.9 CONTROLLED TYPE 2 DIABETES MELLITUS WITHOUT COMPLICATION, WITHOUT LONG-TERM CURRENT USE OF INSULIN (HCC): Chronic | ICD-10-CM

## 2018-05-21 PROCEDURE — 3044F HG A1C LEVEL LT 7.0%: CPT | Performed by: NURSE PRACTITIONER

## 2018-05-21 PROCEDURE — 99214 OFFICE O/P EST MOD 30 MIN: CPT | Performed by: NURSE PRACTITIONER

## 2018-05-21 PROCEDURE — 2022F DILAT RTA XM EVC RTNOPTHY: CPT | Performed by: NURSE PRACTITIONER

## 2018-05-21 PROCEDURE — 3017F COLORECTAL CA SCREEN DOC REV: CPT | Performed by: NURSE PRACTITIONER

## 2018-05-21 PROCEDURE — G8427 DOCREV CUR MEDS BY ELIG CLIN: HCPCS | Performed by: NURSE PRACTITIONER

## 2018-05-21 PROCEDURE — 1036F TOBACCO NON-USER: CPT | Performed by: NURSE PRACTITIONER

## 2018-05-21 PROCEDURE — G8417 CALC BMI ABV UP PARAM F/U: HCPCS | Performed by: NURSE PRACTITIONER

## 2018-05-21 ASSESSMENT — SLEEP AND FATIGUE QUESTIONNAIRES
ESS TOTAL SCORE: 5
HOW LIKELY ARE YOU TO NOD OFF OR FALL ASLEEP WHILE WATCHING TV: 1
HOW LIKELY ARE YOU TO NOD OFF OR FALL ASLEEP WHILE LYING DOWN TO REST IN THE AFTERNOON WHEN CIRCUMSTANCES PERMIT: 1
HOW LIKELY ARE YOU TO NOD OFF OR FALL ASLEEP WHILE SITTING QUIETLY AFTER LUNCH WITHOUT ALCOHOL: 1
HOW LIKELY ARE YOU TO NOD OFF OR FALL ASLEEP WHILE SITTING INACTIVE IN A PUBLIC PLACE: 0
HOW LIKELY ARE YOU TO NOD OFF OR FALL ASLEEP IN A CAR, WHILE STOPPED FOR A FEW MINUTES IN TRAFFIC: 0
HOW LIKELY ARE YOU TO NOD OFF OR FALL ASLEEP WHEN YOU ARE A PASSENGER IN A CAR FOR AN HOUR WITHOUT A BREAK: 1
HOW LIKELY ARE YOU TO NOD OFF OR FALL ASLEEP WHILE SITTING AND TALKING TO SOMEONE: 0
HOW LIKELY ARE YOU TO NOD OFF OR FALL ASLEEP WHILE SITTING AND READING: 1

## 2018-05-21 ASSESSMENT — ENCOUNTER SYMPTOMS
ABDOMINAL DISTENTION: 0
ABDOMINAL PAIN: 0
RHINORRHEA: 0
APNEA: 0
SINUS PRESSURE: 0
SHORTNESS OF BREATH: 0
COUGH: 0

## 2018-08-01 ENCOUNTER — OFFICE VISIT (OUTPATIENT)
Dept: FAMILY MEDICINE CLINIC | Age: 53
End: 2018-08-01

## 2018-08-01 VITALS
SYSTOLIC BLOOD PRESSURE: 136 MMHG | OXYGEN SATURATION: 99 % | DIASTOLIC BLOOD PRESSURE: 80 MMHG | WEIGHT: 179.6 LBS | HEART RATE: 76 BPM | RESPIRATION RATE: 16 BRPM | BODY MASS INDEX: 28.19 KG/M2 | HEIGHT: 67 IN

## 2018-08-01 DIAGNOSIS — Z12.5 SCREENING FOR PROSTATE CANCER: ICD-10-CM

## 2018-08-01 DIAGNOSIS — Z00.00 WELL ADULT EXAM: Primary | ICD-10-CM

## 2018-08-01 DIAGNOSIS — M54.2 NECK PAIN ON RIGHT SIDE: Primary | ICD-10-CM

## 2018-08-01 DIAGNOSIS — M25.511 ACUTE PAIN OF RIGHT SHOULDER: ICD-10-CM

## 2018-08-01 PROCEDURE — G8427 DOCREV CUR MEDS BY ELIG CLIN: HCPCS | Performed by: NURSE PRACTITIONER

## 2018-08-01 PROCEDURE — 99214 OFFICE O/P EST MOD 30 MIN: CPT | Performed by: NURSE PRACTITIONER

## 2018-08-01 PROCEDURE — 1036F TOBACCO NON-USER: CPT | Performed by: NURSE PRACTITIONER

## 2018-08-01 PROCEDURE — G8417 CALC BMI ABV UP PARAM F/U: HCPCS | Performed by: NURSE PRACTITIONER

## 2018-08-01 PROCEDURE — 3017F COLORECTAL CA SCREEN DOC REV: CPT | Performed by: NURSE PRACTITIONER

## 2018-08-01 RX ORDER — IBUPROFEN 600 MG/1
600 TABLET ORAL EVERY 8 HOURS PRN
Qty: 60 TABLET | Refills: 2 | Status: SHIPPED | OUTPATIENT
Start: 2018-08-01 | End: 2019-11-19 | Stop reason: SDUPTHER

## 2018-08-01 ASSESSMENT — PATIENT HEALTH QUESTIONNAIRE - PHQ9
2. FEELING DOWN, DEPRESSED OR HOPELESS: 0
1. LITTLE INTEREST OR PLEASURE IN DOING THINGS: 0
SUM OF ALL RESPONSES TO PHQ9 QUESTIONS 1 & 2: 0
SUM OF ALL RESPONSES TO PHQ QUESTIONS 1-9: 0

## 2018-08-01 ASSESSMENT — ENCOUNTER SYMPTOMS
SHORTNESS OF BREATH: 0
ORTHOPNEA: 0

## 2018-08-01 NOTE — PROGRESS NOTES
Yes Jackie Oh, APRN - CNP   UNABLE TO FIND Per patient He is not on any OTC medication at this time. Yes Historical Provider, MD   fluticasone (FLONASE) 50 MCG/ACT nasal spray 2 sprays by Nasal route daily Yes Nicole Herman MD         Patient's allergies, past medical, surgical, social and family histories were reviewed and updated as appropriate. Review of Systems   Constitutional: Negative for diaphoresis, fever and malaise/fatigue. Respiratory: Negative for shortness of breath. Cardiovascular: Negative for chest pain, palpitations and orthopnea. Musculoskeletal: Positive for joint pain (right shoulder) and neck pain (right sided). Neurological: Negative for dizziness, tingling and headaches. Physical Exam   Constitutional: He is oriented to person, place, and time. He appears well-developed and well-nourished. Neck: Normal range of motion. Neck supple. Muscular tenderness present. No spinous process tenderness present. No edema, no erythema and normal range of motion present. Cardiovascular: Normal rate, regular rhythm and normal heart sounds. No murmur heard. Pulses:       Radial pulses are 2+ on the right side, and 2+ on the left side. No lower extremity edema noted. Pulmonary/Chest: Effort normal and breath sounds normal.   Musculoskeletal:        Right shoulder: He exhibits tenderness and pain. He exhibits normal range of motion, no swelling, no spasm, normal pulse and normal strength. Arms:  Neurological: He is alert and oriented to person, place, and time. Skin: Skin is warm and dry. Vitals:    08/01/18 1142   BP: 136/80   Pulse: 76   Resp: 16   SpO2: 99%   Weight: 179 lb 9.6 oz (81.5 kg)   Height: 5' 7\" (1.702 m)             ASSESSMENT:  1. Neck pain on right side    2. Acute pain of right shoulder        PLAN:  1. Neck pain on right side  New problem  -     ibuprofen (IBU) 600 MG tablet;  Take 1 tablet by mouth every 8 hours as needed for Pain  Rest,

## 2018-08-01 NOTE — PATIENT INSTRUCTIONS
lower your elbows down and behind your back. You will feel your shoulder blades slide down and together, and at the same time you will feel a stretch across your chest and the front of your shoulders. 4. Hold for about 6 seconds, and then relax for up to 10 seconds. 5. Repeat 8 to 12 times. Strengthening: Hands on head    1. Move your head backward, forward, and side to side against gentle pressure from your hands, holding each position for about 6 seconds. 2. Repeat 8 to 12 times. Follow-up care is a key part of your treatment and safety. Be sure to make and go to all appointments, and call your doctor if you are having problems. It's also a good idea to know your test results and keep a list of the medicines you take. Where can you learn more? Go to https://Inspire Healthkwaneb.Anyang [employer]. org and sign in to your BudgetSimple account. Enter P975 in the Blink box to learn more about \"Neck: Exercises. \"     If you do not have an account, please click on the \"Sign Up Now\" link. Current as of: November 29, 2017  Content Version: 11.6  © 9420-4422 "eConscribi[employer]. Care instructions adapted under license by [facility] ([facility]). If you have questions about a medical condition or this instruction, always ask your healthcare professional. [address] any warranty or liability for your use of this information. Patient Education        Shoulder Stretches: Exercises  Your Care Instructions  Here are some examples of exercises for your shoulder. Start each exercise slowly. Ease off the exercise if you start to have pain. Your doctor or physical therapist will tell you when you can start these exercises and which ones will work best for you. How to do the exercises  Shoulder stretch    5.  a doorway and place one arm against the door frame. Your elbow should be a little higher than your shoulder.   6. Relax your shoulders as you lean forward, allowing your chest and shoulder muscles to stretch. You can also turn your body slightly away from your arm to stretch the muscles even more. 7. Hold for 15 to 30 seconds. 8. Repeat 2 to 4 times with each arm. Shoulder and chest stretch    4. Shoulder and chest stretch  5. While sitting, relax your upper body so you slump slightly in your chair. 6. As you breathe in, straighten your back and open your arms out to the sides. 7. Gently pull your shoulder blades back and downward. 8. Hold for 15 to 30 seconds as your breathe normally. 9. Repeat 2 to 4 times. Overhead stretch    5. Reach up over your head with both arms. 6. Hold for 15 to 30 seconds. 7. Repeat 2 to 4 times. Follow-up care is a key part of your treatment and safety. Be sure to make and go to all appointments, and call your doctor if you are having problems. It's also a good idea to know your test results and keep a list of the medicines you take. Where can you learn more? Go to https://EndoSphere.Scooters. org and sign in to your Harvest Automation account. Enter S254 in the Booktrack box to learn more about \"Shoulder Stretches: Exercises. \"     If you do not have an account, please click on the \"Sign Up Now\" link. Current as of: November 29, 2017  Content Version: 11.6  © 0945-4899 K2 Learning, Incorporated. Care instructions adapted under license by [facility] ([facility]). If you have questions about a medical condition or this instruction, always ask your healthcare professional. [address] [address] any warranty or liability for your use of this information.

## 2018-08-07 RX ORDER — LISINOPRIL 10 MG/1
TABLET ORAL
Qty: 90 TABLET | Refills: 3 | Status: SHIPPED | OUTPATIENT
Start: 2018-08-07 | End: 2019-08-29 | Stop reason: SDUPTHER

## 2018-09-12 ENCOUNTER — OFFICE VISIT (OUTPATIENT)
Dept: PULMONOLOGY | Age: 53
End: 2018-09-12

## 2018-09-12 VITALS
HEART RATE: 77 BPM | OXYGEN SATURATION: 96 % | BODY MASS INDEX: 28.25 KG/M2 | DIASTOLIC BLOOD PRESSURE: 70 MMHG | SYSTOLIC BLOOD PRESSURE: 132 MMHG | WEIGHT: 180 LBS | HEIGHT: 67 IN

## 2018-09-12 DIAGNOSIS — J45.20 MILD INTERMITTENT ASTHMA WITHOUT COMPLICATION: Chronic | ICD-10-CM

## 2018-09-12 DIAGNOSIS — G47.33 OBSTRUCTIVE SLEEP APNEA SYNDROME: Primary | Chronic | ICD-10-CM

## 2018-09-12 DIAGNOSIS — E11.9 CONTROLLED TYPE 2 DIABETES MELLITUS WITHOUT COMPLICATION, WITHOUT LONG-TERM CURRENT USE OF INSULIN (HCC): Chronic | ICD-10-CM

## 2018-09-12 DIAGNOSIS — J30.9 ALLERGIC RHINITIS, UNSPECIFIED SEASONALITY, UNSPECIFIED TRIGGER: Chronic | ICD-10-CM

## 2018-09-12 PROCEDURE — 3017F COLORECTAL CA SCREEN DOC REV: CPT | Performed by: NURSE PRACTITIONER

## 2018-09-12 PROCEDURE — 3044F HG A1C LEVEL LT 7.0%: CPT | Performed by: NURSE PRACTITIONER

## 2018-09-12 PROCEDURE — 99214 OFFICE O/P EST MOD 30 MIN: CPT | Performed by: NURSE PRACTITIONER

## 2018-09-12 PROCEDURE — G8417 CALC BMI ABV UP PARAM F/U: HCPCS | Performed by: NURSE PRACTITIONER

## 2018-09-12 PROCEDURE — 2022F DILAT RTA XM EVC RTNOPTHY: CPT | Performed by: NURSE PRACTITIONER

## 2018-09-12 PROCEDURE — G8427 DOCREV CUR MEDS BY ELIG CLIN: HCPCS | Performed by: NURSE PRACTITIONER

## 2018-09-12 PROCEDURE — 1036F TOBACCO NON-USER: CPT | Performed by: NURSE PRACTITIONER

## 2018-09-12 ASSESSMENT — SLEEP AND FATIGUE QUESTIONNAIRES
HOW LIKELY ARE YOU TO NOD OFF OR FALL ASLEEP WHILE LYING DOWN TO REST IN THE AFTERNOON WHEN CIRCUMSTANCES PERMIT: 1
HOW LIKELY ARE YOU TO NOD OFF OR FALL ASLEEP IN A CAR, WHILE STOPPED FOR A FEW MINUTES IN TRAFFIC: 0
HOW LIKELY ARE YOU TO NOD OFF OR FALL ASLEEP WHILE SITTING QUIETLY AFTER LUNCH WITHOUT ALCOHOL: 1
HOW LIKELY ARE YOU TO NOD OFF OR FALL ASLEEP WHILE SITTING AND READING: 3
HOW LIKELY ARE YOU TO NOD OFF OR FALL ASLEEP WHILE WATCHING TV: 0
HOW LIKELY ARE YOU TO NOD OFF OR FALL ASLEEP WHILE SITTING AND TALKING TO SOMEONE: 1
HOW LIKELY ARE YOU TO NOD OFF OR FALL ASLEEP WHEN YOU ARE A PASSENGER IN A CAR FOR AN HOUR WITHOUT A BREAK: 1
HOW LIKELY ARE YOU TO NOD OFF OR FALL ASLEEP WHILE SITTING INACTIVE IN A PUBLIC PLACE: 1
ESS TOTAL SCORE: 8

## 2018-09-12 ASSESSMENT — ENCOUNTER SYMPTOMS
SHORTNESS OF BREATH: 0
ABDOMINAL DISTENTION: 0
ABDOMINAL PAIN: 0
SINUS PRESSURE: 0
COUGH: 0
APNEA: 0
RHINORRHEA: 0

## 2018-09-12 NOTE — LETTER
[facility]  [address]   [facility]  [address]  [place] [address]  Phone: [phone]  Fax: [phone]    September 12, 2018       Patient: Heriberto Serrano   MR Number: [id number]   YOB: 1965   Date of Visit: 9/12/2018       Jaylen Frias was seen for a follow up visit today. Here is my assessment and plan as well as an attached copy of his visit today:    No problem-specific Assessment & Plan notes found for this encounter. If you have questions or concerns, please do not hesitate to call me. I look forward to following Puneet Acevedo along with you. Sincerely,    CHRISTIAN Antoine - CNP    CC providers:  Juancho Alston MD  [address]

## 2018-09-12 NOTE — PROGRESS NOTES
Difficulties falling asleep  [] Yes  [x] No   Difficulties staying asleep  [] Yes  [x] No   Approximate time to bed  12:30am   Approximate wake time  6-6:30am   Taking Naps  occasional   If taking naps usual length  30+ min    If taking naps using the machine  [] Yes  [x] No  [] NA   Drowsy when driving  [] Yes  [x] No     Does patient carry a DOT/CDL  [] Yes  [x] No     Does patient carry FAA/Pilots License   [] Yes  [x] No      Any concerns noted with the machine at this time  [] Yes  [x] No        Diagnosis Orders   1. Obstructive sleep apnea syndrome Stable     needing more time on the machine    2. Mild intermittent asthma without complication Stable    3. Controlled type 2 diabetes mellitus without complication, without long-term current use of insulin (HCC) Stable    4. Allergic rhinitis, unspecified seasonality, unspecified trigger Stable        The chronic medical conditions listed are directly related to the primary diagnosis listed above. The management of the primary diagnosis affects the secondary diagnosis and vice versa. Review of Systems   Constitutional: Negative for appetite change, chills, fatigue and fever. HENT: Negative for congestion, nosebleeds, rhinorrhea and sinus pressure. Respiratory: Negative for apnea, cough and shortness of breath. Cardiovascular: Negative for chest pain and palpitations. Gastrointestinal: Negative for abdominal distention and abdominal pain. Neurological: Negative for dizziness and headaches. Social History     Social History    Marital status:      Spouse name: Leonel Hernández Number of children: 3    Years of education: N/A     Occupational History    Not on file.      Social History Main Topics    Smoking status: Former Smoker     Packs/day: 1.00     Years: 10.00     Types: Cigarettes     Quit date: 3/21/1993    Smokeless tobacco: Never Used    Alcohol use 0.6 oz/week     1 Shots of liquor per week    Drug use: No    Sexual

## 2018-09-14 DIAGNOSIS — Z00.00 WELL ADULT EXAM: ICD-10-CM

## 2018-09-14 DIAGNOSIS — Z12.5 SCREENING FOR PROSTATE CANCER: ICD-10-CM

## 2018-09-14 LAB
A/G RATIO: 2.1 (ref 1.1–2.2)
ALBUMIN SERPL-MCNC: 4.6 G/DL (ref 3.4–5)
ALP BLD-CCNC: 70 U/L (ref 40–129)
ALT SERPL-CCNC: 19 U/L (ref 10–40)
ANION GAP SERPL CALCULATED.3IONS-SCNC: 15 MMOL/L (ref 3–16)
AST SERPL-CCNC: 20 U/L (ref 15–37)
BASOPHILS ABSOLUTE: 0.1 K/UL (ref 0–0.2)
BASOPHILS RELATIVE PERCENT: 1.5 %
BILIRUB SERPL-MCNC: 0.8 MG/DL (ref 0–1)
BUN BLDV-MCNC: 12 MG/DL (ref 7–20)
CALCIUM SERPL-MCNC: 9.4 MG/DL (ref 8.3–10.6)
CHLORIDE BLD-SCNC: 101 MMOL/L (ref 99–110)
CHOLESTEROL, FASTING: 146 MG/DL (ref 0–199)
CO2: 26 MMOL/L (ref 21–32)
CREAT SERPL-MCNC: 0.7 MG/DL (ref 0.9–1.3)
EOSINOPHILS ABSOLUTE: 0.2 K/UL (ref 0–0.6)
EOSINOPHILS RELATIVE PERCENT: 3.8 %
GFR AFRICAN AMERICAN: >60
GFR NON-AFRICAN AMERICAN: >60
GLOBULIN: 2.2 G/DL
GLUCOSE FASTING: 108 MG/DL (ref 70–99)
HCT VFR BLD CALC: 41.4 % (ref 40.5–52.5)
HDLC SERPL-MCNC: 41 MG/DL (ref 40–60)
HEMOGLOBIN: 14.1 G/DL (ref 13.5–17.5)
LDL CHOLESTEROL CALCULATED: 74 MG/DL
LYMPHOCYTES ABSOLUTE: 2.2 K/UL (ref 1–5.1)
LYMPHOCYTES RELATIVE PERCENT: 37 %
MCH RBC QN AUTO: 31.4 PG (ref 26–34)
MCHC RBC AUTO-ENTMCNC: 34 G/DL (ref 31–36)
MCV RBC AUTO: 92.2 FL (ref 80–100)
MONOCYTES ABSOLUTE: 0.5 K/UL (ref 0–1.3)
MONOCYTES RELATIVE PERCENT: 8 %
NEUTROPHILS ABSOLUTE: 2.9 K/UL (ref 1.7–7.7)
NEUTROPHILS RELATIVE PERCENT: 49.7 %
PDW BLD-RTO: 13.4 % (ref 12.4–15.4)
PLATELET # BLD: 211 K/UL (ref 135–450)
PMV BLD AUTO: 8.8 FL (ref 5–10.5)
POTASSIUM SERPL-SCNC: 3.9 MMOL/L (ref 3.5–5.1)
PROSTATE SPECIFIC ANTIGEN: 0.38 NG/ML (ref 0–4)
RBC # BLD: 4.49 M/UL (ref 4.2–5.9)
SODIUM BLD-SCNC: 142 MMOL/L (ref 136–145)
TOTAL PROTEIN: 6.8 G/DL (ref 6.4–8.2)
TRIGLYCERIDE, FASTING: 154 MG/DL (ref 0–150)
TSH REFLEX FT4: 1.9 UIU/ML (ref 0.27–4.2)
VLDLC SERPL CALC-MCNC: 31 MG/DL
WBC # BLD: 5.9 K/UL (ref 4–11)

## 2018-09-20 ENCOUNTER — TELEPHONE (OUTPATIENT)
Dept: FAMILY MEDICINE CLINIC | Age: 53
End: 2018-09-20

## 2018-09-20 DIAGNOSIS — G89.29 CHRONIC RIGHT SHOULDER PAIN: Primary | ICD-10-CM

## 2018-09-20 DIAGNOSIS — M25.511 CHRONIC RIGHT SHOULDER PAIN: Primary | ICD-10-CM

## 2018-09-20 NOTE — TELEPHONE ENCOUNTER
Please place a referral in [facility] and return this encounter to the front office referral team for processing.     REFER TO:  Dr. Ziegler Crimes:  Denys Coelho PHONE #:  [phone]  Trixie Lyons #:  ?  DATE OF SERVICE:  Does not have an appointment yet  REASON FOR VISIT:  Right shoulder pain  PATIENT INSURANCE:  [facility]

## 2018-11-01 ENCOUNTER — OFFICE VISIT (OUTPATIENT)
Dept: ORTHOPEDIC SURGERY | Age: 53
End: 2018-11-01
Payer: COMMERCIAL

## 2018-11-01 VITALS
RESPIRATION RATE: 17 BRPM | DIASTOLIC BLOOD PRESSURE: 92 MMHG | HEART RATE: 79 BPM | WEIGHT: 179.9 LBS | BODY MASS INDEX: 28.24 KG/M2 | HEIGHT: 67 IN | SYSTOLIC BLOOD PRESSURE: 154 MMHG

## 2018-11-01 DIAGNOSIS — M75.81 TENDINITIS OF RIGHT ROTATOR CUFF: Primary | ICD-10-CM

## 2018-11-01 DIAGNOSIS — M25.511 RIGHT SHOULDER PAIN, UNSPECIFIED CHRONICITY: ICD-10-CM

## 2018-11-01 PROCEDURE — 99243 OFF/OP CNSLTJ NEW/EST LOW 30: CPT | Performed by: ORTHOPAEDIC SURGERY

## 2018-11-01 PROCEDURE — G8427 DOCREV CUR MEDS BY ELIG CLIN: HCPCS | Performed by: ORTHOPAEDIC SURGERY

## 2018-11-01 PROCEDURE — G8484 FLU IMMUNIZE NO ADMIN: HCPCS | Performed by: ORTHOPAEDIC SURGERY

## 2018-11-01 PROCEDURE — 3017F COLORECTAL CA SCREEN DOC REV: CPT | Performed by: ORTHOPAEDIC SURGERY

## 2018-11-01 PROCEDURE — G8417 CALC BMI ABV UP PARAM F/U: HCPCS | Performed by: ORTHOPAEDIC SURGERY

## 2018-11-05 ENCOUNTER — TELEPHONE (OUTPATIENT)
Dept: FAMILY MEDICINE CLINIC | Age: 53
End: 2018-11-05

## 2018-11-15 ENCOUNTER — OFFICE VISIT (OUTPATIENT)
Dept: FAMILY MEDICINE CLINIC | Age: 53
End: 2018-11-15
Payer: COMMERCIAL

## 2018-11-15 VITALS
WEIGHT: 193.6 LBS | DIASTOLIC BLOOD PRESSURE: 66 MMHG | HEART RATE: 71 BPM | HEIGHT: 67 IN | BODY MASS INDEX: 30.39 KG/M2 | SYSTOLIC BLOOD PRESSURE: 118 MMHG | OXYGEN SATURATION: 96 %

## 2018-11-15 DIAGNOSIS — G47.33 OSA (OBSTRUCTIVE SLEEP APNEA): ICD-10-CM

## 2018-11-15 DIAGNOSIS — E78.00 PURE HYPERCHOLESTEROLEMIA: Chronic | ICD-10-CM

## 2018-11-15 DIAGNOSIS — J45.20 MILD INTERMITTENT ASTHMA WITHOUT COMPLICATION: Chronic | ICD-10-CM

## 2018-11-15 DIAGNOSIS — E11.9 CONTROLLED TYPE 2 DIABETES MELLITUS WITHOUT COMPLICATION, WITHOUT LONG-TERM CURRENT USE OF INSULIN (HCC): Primary | Chronic | ICD-10-CM

## 2018-11-15 LAB
CREATININE URINE POCT: 200
HBA1C MFR BLD: 6.1 %
MICROALBUMIN/CREAT 24H UR: 80 MG/G{CREAT}
MICROALBUMIN/CREAT UR-RTO: NORMAL

## 2018-11-15 PROCEDURE — 99214 OFFICE O/P EST MOD 30 MIN: CPT | Performed by: FAMILY MEDICINE

## 2018-11-15 PROCEDURE — G8484 FLU IMMUNIZE NO ADMIN: HCPCS | Performed by: FAMILY MEDICINE

## 2018-11-15 PROCEDURE — G8427 DOCREV CUR MEDS BY ELIG CLIN: HCPCS | Performed by: FAMILY MEDICINE

## 2018-11-15 PROCEDURE — 3017F COLORECTAL CA SCREEN DOC REV: CPT | Performed by: FAMILY MEDICINE

## 2018-11-15 PROCEDURE — 83036 HEMOGLOBIN GLYCOSYLATED A1C: CPT | Performed by: FAMILY MEDICINE

## 2018-11-15 PROCEDURE — 1036F TOBACCO NON-USER: CPT | Performed by: FAMILY MEDICINE

## 2018-11-15 PROCEDURE — 2022F DILAT RTA XM EVC RTNOPTHY: CPT | Performed by: FAMILY MEDICINE

## 2018-11-15 PROCEDURE — 3044F HG A1C LEVEL LT 7.0%: CPT | Performed by: FAMILY MEDICINE

## 2018-11-15 PROCEDURE — 82044 UR ALBUMIN SEMIQUANTITATIVE: CPT | Performed by: FAMILY MEDICINE

## 2018-11-15 PROCEDURE — G8417 CALC BMI ABV UP PARAM F/U: HCPCS | Performed by: FAMILY MEDICINE

## 2018-11-15 ASSESSMENT — PATIENT HEALTH QUESTIONNAIRE - PHQ9
SUM OF ALL RESPONSES TO PHQ9 QUESTIONS 1 & 2: 0
SUM OF ALL RESPONSES TO PHQ QUESTIONS 1-9: 0
SUM OF ALL RESPONSES TO PHQ QUESTIONS 1-9: 0
1. LITTLE INTEREST OR PLEASURE IN DOING THINGS: 0
2. FEELING DOWN, DEPRESSED OR HOPELESS: 0

## 2018-11-15 NOTE — PROGRESS NOTES
FIND Per patient He is not on any OTC medication at this time. Yes Historical Provider, MD   fluticasone (FLONASE) 50 MCG/ACT nasal spray 2 sprays by Nasal route daily Yes Zee Baker MD       Past Medical History:   Diagnosis Date    AR (allergic rhinitis)     Mild intermittent asthma without complication 18/7/1496    RENA (obstructive sleep apnea) 6/29/2017    Pure hypercholesterolemia 12/3/2015    Type II or unspecified type diabetes mellitus without mention of complication, not stated as uncontrolled 1/14       Social History   Substance Use Topics    Smoking status: Former Smoker     Packs/day: 1.00     Years: 10.00     Types: Cigarettes     Quit date: 3/21/1993    Smokeless tobacco: Never Used    Alcohol use 0.6 oz/week     1 Shots of liquor per week       Family History   Problem Relation Age of Onset    Diabetes Father     Diabetes Brother     Heart Attack Brother 51       No Known Allergies    OBJECTIVE:  /66   Pulse 71   Ht 5' 7.01\" (1.702 m)   Wt 193 lb 9.6 oz (87.8 kg)   SpO2 96%   BMI 30.31 kg/m²   GEN:  in NADModerately obese weight stable   NECK:  Supple without adenopathy. No bruits   CV:  Regular rate and rhythm, S1 and S2 normal, no murmurs, clicks  PULM:  Chest is clear, no wheezing ,  symmetric air entry throughout both lung fields.   EXT: No rash or edema  NEURO: nonfocal  a1c - 6.1  No results found for: BNP   Lab Results   Component Value Date    CHOL 189 06/26/2017    CHOL 162 06/09/2016    CHOL 173 12/02/2015     Lab Results   Component Value Date    TRIG 162 (H) 06/26/2017    TRIG 182 (H) 06/09/2016    TRIG 169 (H) 12/02/2015     Lab Results   Component Value Date    HDL 41 09/14/2018    HDL 44 06/26/2017    HDL 38 (L) 06/09/2016     Lab Results   Component Value Date    LDLCALC 74 09/14/2018    LDLCALC 113 (H) 06/26/2017    LDLCALC 88 06/09/2016     Lab Results   Component Value Date    LABVLDL 31 09/14/2018    LABVLDL 32 06/26/2017    LABVLDL 36 06/09/2016     No

## 2018-11-19 ENCOUNTER — HOSPITAL ENCOUNTER (OUTPATIENT)
Dept: PHYSICAL THERAPY | Age: 53
Setting detail: THERAPIES SERIES
Discharge: HOME | End: 2018-11-19
Payer: COMMERCIAL

## 2018-11-19 PROCEDURE — G8985 CARRY GOAL STATUS: HCPCS

## 2018-11-19 PROCEDURE — 97530 THERAPEUTIC ACTIVITIES: CPT

## 2018-11-19 PROCEDURE — 97161 PT EVAL LOW COMPLEX 20 MIN: CPT

## 2018-11-19 PROCEDURE — G8984 CARRY CURRENT STATUS: HCPCS

## 2018-11-19 RX ORDER — SIMVASTATIN 20 MG
TABLET ORAL
Qty: 90 TABLET | Refills: 3 | Status: SHIPPED | OUTPATIENT
Start: 2018-11-19 | End: 2019-11-19 | Stop reason: SDUPTHER

## 2018-12-03 ENCOUNTER — HOSPITAL ENCOUNTER (OUTPATIENT)
Dept: PHYSICAL THERAPY | Age: 53
Setting detail: THERAPIES SERIES
Discharge: HOME | End: 2018-12-03
Payer: COMMERCIAL

## 2018-12-19 NOTE — PROGRESS NOTES
Physical Therapy  Cancellation/No-show Note  Patient Name:  Ligia Herman  :  1965   Date:  2018  Cancelled visits to date: 5  No-shows to date: 1    Patient status for today's appointment patient:  [x]  Cancelled , 12/10, , ,   []  Rescheduled appointment  [x]  No-show 12/3     Reason given by patient:  []  Patient ill  []  Conflicting appointment  []  No transportation    []  Conflict with work  [x]  No reason given  []  Other:     Comments:      Phone call information:   []  Phone call made today to patient at _ time at number provided:      []  Patient answered, conversation as follows:    []  Patient did not answer, message left as follows:  [x]  Phone call not made today    Electronically signed by:  Philipp Wolf, PT

## 2018-12-19 NOTE — DISCHARGE SUMMARY
discharged     [] Additional visits requested, Please re-certify for additional visits:           Electronically signed by:  Tye Altamirano, PT    Thank you for this kind and gracious referral for skilled PT services.  I appreciate the opportunity to take part in coordinating and providing care for this individual.     Sincerely,    Oskar López PT, DPT        Physician Signature:________________________________Date:__________________  By signing above, therapists plan is approved by physician

## 2018-12-28 ENCOUNTER — HOSPITAL ENCOUNTER (OUTPATIENT)
Dept: PHYSICAL THERAPY | Age: 53
Setting detail: THERAPIES SERIES
Discharge: HOME | End: 2018-12-28
Payer: COMMERCIAL

## 2018-12-28 NOTE — PROGRESS NOTES
Physical Therapy    Pt came to therapy today stating he has recently started a new job which is why he no showed/Cancelled last 6 visits. He states that since his initial evaluation he has been performing his HEP and his shoulder has been feeling much better. A quick screen was performed where he demonstrated full AROM of right shoulder and 5/5 strength for GH Flex, aBd, IR and ER. He was provided a free 30 day [facility] pass and was shown how to use/operate equipment. He plans to follow up with Dr. Eris Costa on 1/3/2019 to determine next course of action.      Ashvin Omalley PT, DPT

## 2019-01-03 ENCOUNTER — OFFICE VISIT (OUTPATIENT)
Dept: ORTHOPEDIC SURGERY | Age: 54
End: 2019-01-03
Payer: COMMERCIAL

## 2019-01-03 VITALS
HEART RATE: 72 BPM | SYSTOLIC BLOOD PRESSURE: 136 MMHG | DIASTOLIC BLOOD PRESSURE: 81 MMHG | BODY MASS INDEX: 30.38 KG/M2 | WEIGHT: 193.56 LBS | HEIGHT: 67 IN | RESPIRATION RATE: 17 BRPM

## 2019-01-03 DIAGNOSIS — M75.81 TENDINITIS OF RIGHT ROTATOR CUFF: Primary | ICD-10-CM

## 2019-01-03 PROCEDURE — 1036F TOBACCO NON-USER: CPT | Performed by: ORTHOPAEDIC SURGERY

## 2019-01-03 PROCEDURE — G8417 CALC BMI ABV UP PARAM F/U: HCPCS | Performed by: ORTHOPAEDIC SURGERY

## 2019-01-03 PROCEDURE — G8427 DOCREV CUR MEDS BY ELIG CLIN: HCPCS | Performed by: ORTHOPAEDIC SURGERY

## 2019-01-03 PROCEDURE — 3017F COLORECTAL CA SCREEN DOC REV: CPT | Performed by: ORTHOPAEDIC SURGERY

## 2019-01-03 PROCEDURE — 99213 OFFICE O/P EST LOW 20 MIN: CPT | Performed by: ORTHOPAEDIC SURGERY

## 2019-01-03 PROCEDURE — G8484 FLU IMMUNIZE NO ADMIN: HCPCS | Performed by: ORTHOPAEDIC SURGERY

## 2019-01-25 RX ORDER — LANCETS 28 GAUGE
EACH MISCELLANEOUS
Qty: 100 EACH | Refills: 3 | Status: SHIPPED | OUTPATIENT
Start: 2019-01-25 | End: 2021-06-24

## 2019-02-06 ENCOUNTER — TELEPHONE (OUTPATIENT)
Dept: FAMILY MEDICINE CLINIC | Age: 54
End: 2019-02-06

## 2019-02-26 ENCOUNTER — NURSE TRIAGE (OUTPATIENT)
Dept: OTHER | Facility: CLINIC | Age: 54
End: 2019-02-26

## 2019-02-27 ENCOUNTER — OFFICE VISIT (OUTPATIENT)
Dept: FAMILY MEDICINE CLINIC | Age: 54
End: 2019-02-27
Payer: COMMERCIAL

## 2019-02-27 VITALS
WEIGHT: 192.4 LBS | HEART RATE: 72 BPM | BODY MASS INDEX: 30.13 KG/M2 | OXYGEN SATURATION: 99 % | DIASTOLIC BLOOD PRESSURE: 80 MMHG | SYSTOLIC BLOOD PRESSURE: 132 MMHG

## 2019-02-27 DIAGNOSIS — R60.0 LOCALIZED EDEMA: Primary | ICD-10-CM

## 2019-02-27 DIAGNOSIS — E11.9 CONTROLLED TYPE 2 DIABETES MELLITUS WITHOUT COMPLICATION, WITHOUT LONG-TERM CURRENT USE OF INSULIN (HCC): Chronic | ICD-10-CM

## 2019-02-27 PROCEDURE — 3017F COLORECTAL CA SCREEN DOC REV: CPT | Performed by: NURSE PRACTITIONER

## 2019-02-27 PROCEDURE — G8417 CALC BMI ABV UP PARAM F/U: HCPCS | Performed by: NURSE PRACTITIONER

## 2019-02-27 PROCEDURE — 1036F TOBACCO NON-USER: CPT | Performed by: NURSE PRACTITIONER

## 2019-02-27 PROCEDURE — G8427 DOCREV CUR MEDS BY ELIG CLIN: HCPCS | Performed by: NURSE PRACTITIONER

## 2019-02-27 PROCEDURE — G8484 FLU IMMUNIZE NO ADMIN: HCPCS | Performed by: NURSE PRACTITIONER

## 2019-02-27 PROCEDURE — 3046F HEMOGLOBIN A1C LEVEL >9.0%: CPT | Performed by: NURSE PRACTITIONER

## 2019-02-27 PROCEDURE — 2022F DILAT RTA XM EVC RTNOPTHY: CPT | Performed by: NURSE PRACTITIONER

## 2019-02-27 PROCEDURE — 99213 OFFICE O/P EST LOW 20 MIN: CPT | Performed by: NURSE PRACTITIONER

## 2019-02-27 ASSESSMENT — ENCOUNTER SYMPTOMS
CHEST TIGHTNESS: 0
COUGH: 0
SHORTNESS OF BREATH: 0

## 2019-02-27 ASSESSMENT — PATIENT HEALTH QUESTIONNAIRE - PHQ9
SUM OF ALL RESPONSES TO PHQ9 QUESTIONS 1 & 2: 0
1. LITTLE INTEREST OR PLEASURE IN DOING THINGS: 0
2. FEELING DOWN, DEPRESSED OR HOPELESS: 0
SUM OF ALL RESPONSES TO PHQ QUESTIONS 1-9: 0
SUM OF ALL RESPONSES TO PHQ QUESTIONS 1-9: 0

## 2019-03-13 ENCOUNTER — OFFICE VISIT (OUTPATIENT)
Dept: PULMONOLOGY | Age: 54
End: 2019-03-13
Payer: COMMERCIAL

## 2019-03-13 VITALS
HEART RATE: 62 BPM | HEIGHT: 67 IN | OXYGEN SATURATION: 98 % | BODY MASS INDEX: 30.92 KG/M2 | WEIGHT: 197 LBS | DIASTOLIC BLOOD PRESSURE: 80 MMHG | SYSTOLIC BLOOD PRESSURE: 134 MMHG

## 2019-03-13 DIAGNOSIS — J45.20 MILD INTERMITTENT ASTHMA WITHOUT COMPLICATION: ICD-10-CM

## 2019-03-13 DIAGNOSIS — G47.26 SHIFT WORK SLEEP DISORDER: ICD-10-CM

## 2019-03-13 DIAGNOSIS — G47.33 OSA (OBSTRUCTIVE SLEEP APNEA): Primary | ICD-10-CM

## 2019-03-13 DIAGNOSIS — E11.9 CONTROLLED TYPE 2 DIABETES MELLITUS WITHOUT COMPLICATION, WITHOUT LONG-TERM CURRENT USE OF INSULIN (HCC): Chronic | ICD-10-CM

## 2019-03-13 DIAGNOSIS — E66.9 OBESITY (BMI 30-39.9): ICD-10-CM

## 2019-03-13 PROCEDURE — 1036F TOBACCO NON-USER: CPT | Performed by: NURSE PRACTITIONER

## 2019-03-13 PROCEDURE — 3017F COLORECTAL CA SCREEN DOC REV: CPT | Performed by: NURSE PRACTITIONER

## 2019-03-13 PROCEDURE — G8484 FLU IMMUNIZE NO ADMIN: HCPCS | Performed by: NURSE PRACTITIONER

## 2019-03-13 PROCEDURE — G8417 CALC BMI ABV UP PARAM F/U: HCPCS | Performed by: NURSE PRACTITIONER

## 2019-03-13 PROCEDURE — 2022F DILAT RTA XM EVC RTNOPTHY: CPT | Performed by: NURSE PRACTITIONER

## 2019-03-13 PROCEDURE — G8427 DOCREV CUR MEDS BY ELIG CLIN: HCPCS | Performed by: NURSE PRACTITIONER

## 2019-03-13 PROCEDURE — 99214 OFFICE O/P EST MOD 30 MIN: CPT | Performed by: NURSE PRACTITIONER

## 2019-03-13 PROCEDURE — 3046F HEMOGLOBIN A1C LEVEL >9.0%: CPT | Performed by: NURSE PRACTITIONER

## 2019-03-13 ASSESSMENT — SLEEP AND FATIGUE QUESTIONNAIRES
HOW LIKELY ARE YOU TO NOD OFF OR FALL ASLEEP WHILE SITTING QUIETLY AFTER LUNCH WITHOUT ALCOHOL: 2
HOW LIKELY ARE YOU TO NOD OFF OR FALL ASLEEP WHILE SITTING INACTIVE IN A PUBLIC PLACE: 2
HOW LIKELY ARE YOU TO NOD OFF OR FALL ASLEEP WHEN YOU ARE A PASSENGER IN A CAR FOR AN HOUR WITHOUT A BREAK: 0
HOW LIKELY ARE YOU TO NOD OFF OR FALL ASLEEP WHILE SITTING AND READING: 3
HOW LIKELY ARE YOU TO NOD OFF OR FALL ASLEEP WHILE SITTING AND TALKING TO SOMEONE: 2
HOW LIKELY ARE YOU TO NOD OFF OR FALL ASLEEP WHILE WATCHING TV: 3
ESS TOTAL SCORE: 16
HOW LIKELY ARE YOU TO NOD OFF OR FALL ASLEEP WHILE LYING DOWN TO REST IN THE AFTERNOON WHEN CIRCUMSTANCES PERMIT: 2
HOW LIKELY ARE YOU TO NOD OFF OR FALL ASLEEP IN A CAR, WHILE STOPPED FOR A FEW MINUTES IN TRAFFIC: 2

## 2019-03-13 ASSESSMENT — ENCOUNTER SYMPTOMS
ABDOMINAL DISTENTION: 0
ABDOMINAL PAIN: 0
SHORTNESS OF BREATH: 0
SINUS PRESSURE: 0
COUGH: 0
APNEA: 0
RHINORRHEA: 0
EYE PAIN: 0
EYE REDNESS: 0

## 2019-03-22 ENCOUNTER — OFFICE VISIT (OUTPATIENT)
Dept: FAMILY MEDICINE CLINIC | Age: 54
End: 2019-03-22
Payer: COMMERCIAL

## 2019-03-22 VITALS
TEMPERATURE: 100.9 F | BODY MASS INDEX: 30.18 KG/M2 | WEIGHT: 192.7 LBS | OXYGEN SATURATION: 98 % | HEART RATE: 88 BPM | DIASTOLIC BLOOD PRESSURE: 82 MMHG | SYSTOLIC BLOOD PRESSURE: 138 MMHG

## 2019-03-22 DIAGNOSIS — J40 BRONCHITIS: Primary | ICD-10-CM

## 2019-03-22 LAB
INFLUENZA A ANTIBODY: NORMAL
INFLUENZA B ANTIBODY: NORMAL

## 2019-03-22 PROCEDURE — 3017F COLORECTAL CA SCREEN DOC REV: CPT | Performed by: FAMILY MEDICINE

## 2019-03-22 PROCEDURE — G8427 DOCREV CUR MEDS BY ELIG CLIN: HCPCS | Performed by: FAMILY MEDICINE

## 2019-03-22 PROCEDURE — 1036F TOBACCO NON-USER: CPT | Performed by: FAMILY MEDICINE

## 2019-03-22 PROCEDURE — G8417 CALC BMI ABV UP PARAM F/U: HCPCS | Performed by: FAMILY MEDICINE

## 2019-03-22 PROCEDURE — G8484 FLU IMMUNIZE NO ADMIN: HCPCS | Performed by: FAMILY MEDICINE

## 2019-03-22 PROCEDURE — 87804 INFLUENZA ASSAY W/OPTIC: CPT | Performed by: FAMILY MEDICINE

## 2019-03-22 PROCEDURE — 99213 OFFICE O/P EST LOW 20 MIN: CPT | Performed by: FAMILY MEDICINE

## 2019-03-22 RX ORDER — BENZONATATE 100 MG/1
100 CAPSULE ORAL 2 TIMES DAILY PRN
Qty: 20 CAPSULE | Refills: 0 | Status: SHIPPED | OUTPATIENT
Start: 2019-03-22 | End: 2019-03-29

## 2019-03-22 RX ORDER — AZITHROMYCIN 250 MG/1
250 TABLET, FILM COATED ORAL SEE ADMIN INSTRUCTIONS
Qty: 6 TABLET | Refills: 0 | Status: SHIPPED | OUTPATIENT
Start: 2019-03-22 | End: 2019-03-27

## 2019-03-26 ENCOUNTER — OFFICE VISIT (OUTPATIENT)
Dept: FAMILY MEDICINE CLINIC | Age: 54
End: 2019-03-26
Payer: COMMERCIAL

## 2019-03-26 VITALS
HEART RATE: 68 BPM | WEIGHT: 195.4 LBS | OXYGEN SATURATION: 99 % | TEMPERATURE: 98 F | SYSTOLIC BLOOD PRESSURE: 138 MMHG | DIASTOLIC BLOOD PRESSURE: 84 MMHG | BODY MASS INDEX: 30.6 KG/M2

## 2019-03-26 DIAGNOSIS — J06.9 PROTRACTED URI: Primary | ICD-10-CM

## 2019-03-26 DIAGNOSIS — H66.001 ACUTE SUPPURATIVE OTITIS MEDIA OF RIGHT EAR WITHOUT SPONTANEOUS RUPTURE OF TYMPANIC MEMBRANE, RECURRENCE NOT SPECIFIED: ICD-10-CM

## 2019-03-26 PROCEDURE — G8427 DOCREV CUR MEDS BY ELIG CLIN: HCPCS | Performed by: NURSE PRACTITIONER

## 2019-03-26 PROCEDURE — 1036F TOBACCO NON-USER: CPT | Performed by: NURSE PRACTITIONER

## 2019-03-26 PROCEDURE — 99213 OFFICE O/P EST LOW 20 MIN: CPT | Performed by: NURSE PRACTITIONER

## 2019-03-26 PROCEDURE — G8484 FLU IMMUNIZE NO ADMIN: HCPCS | Performed by: NURSE PRACTITIONER

## 2019-03-26 PROCEDURE — 3017F COLORECTAL CA SCREEN DOC REV: CPT | Performed by: NURSE PRACTITIONER

## 2019-03-26 PROCEDURE — G8417 CALC BMI ABV UP PARAM F/U: HCPCS | Performed by: NURSE PRACTITIONER

## 2019-03-26 RX ORDER — CEFUROXIME AXETIL 250 MG/1
250 TABLET ORAL 2 TIMES DAILY
Qty: 20 TABLET | Refills: 0 | Status: SHIPPED | OUTPATIENT
Start: 2019-03-26 | End: 2019-05-23 | Stop reason: SDUPTHER

## 2019-03-26 RX ORDER — FLUTICASONE PROPIONATE 50 MCG
2 SPRAY, SUSPENSION (ML) NASAL DAILY
Qty: 1 BOTTLE | Refills: 5 | Status: SHIPPED | OUTPATIENT
Start: 2019-03-26 | End: 2020-09-09 | Stop reason: SDUPTHER

## 2019-03-26 ASSESSMENT — ENCOUNTER SYMPTOMS
CHEST TIGHTNESS: 0
SINUS COMPLAINT: 1
SORE THROAT: 0
WHEEZING: 1
SINUS PRESSURE: 1
RHINORRHEA: 0
SHORTNESS OF BREATH: 1
COUGH: 1

## 2019-04-15 DIAGNOSIS — E11.9 CONTROLLED TYPE 2 DIABETES MELLITUS WITHOUT COMPLICATION, WITHOUT LONG-TERM CURRENT USE OF INSULIN (HCC): Chronic | ICD-10-CM

## 2019-04-15 NOTE — TELEPHONE ENCOUNTER
LOV: 3/26/19    LRF: 4/13/18 ,# 100, 3 refills    Next Ov: 5/16/19    Lab Results   Component Value Date    LABA1C 6.1 11/15/2018     Lab Results   Component Value Date    .2 06/26/2017

## 2019-04-23 ENCOUNTER — TELEPHONE (OUTPATIENT)
Dept: PULMONOLOGY | Age: 54
End: 2019-04-23

## 2019-04-23 NOTE — TELEPHONE ENCOUNTER
Patient called, his work hours got changed and his HR department needs a letter stating pt is unable to work 12hr shifts.  [phone]

## 2019-04-23 NOTE — TELEPHONE ENCOUNTER
Per your last ov note on 3/13/19 patient has shift work sleep disorder and is wanting a work note to excuse him from working 12 hour shift.

## 2019-04-23 NOTE — TELEPHONE ENCOUNTER
Spoke to patient regarding note below, he was under the impression that Munira discussed with him at last ov because he was very tired and fatigued, he thought he was told that if he continued to work so many hours it would cause problems down the line. Patient stated he is working more and having difficult time working the hours and that's why he wanted note stating he can't work 12 hours. Patient wanted this message to be passed on and see if this strikes a memory of the discussion at his last ov.

## 2019-04-30 ENCOUNTER — TELEPHONE (OUTPATIENT)
Dept: PULMONOLOGY | Age: 54
End: 2019-04-30

## 2019-04-30 NOTE — TELEPHONE ENCOUNTER
See previous encounter. .Patient still looking for a letter for his employer, unable to work 12hr shifts.  [phone]

## 2019-05-06 ENCOUNTER — OFFICE VISIT (OUTPATIENT)
Dept: FAMILY MEDICINE CLINIC | Age: 54
End: 2019-05-06
Payer: COMMERCIAL

## 2019-05-06 VITALS
OXYGEN SATURATION: 98 % | HEART RATE: 64 BPM | DIASTOLIC BLOOD PRESSURE: 80 MMHG | WEIGHT: 195.6 LBS | BODY MASS INDEX: 30.64 KG/M2 | SYSTOLIC BLOOD PRESSURE: 126 MMHG

## 2019-05-06 DIAGNOSIS — M54.2 NECK PAIN: ICD-10-CM

## 2019-05-06 DIAGNOSIS — Z56.6 STRESS AT WORK: ICD-10-CM

## 2019-05-06 DIAGNOSIS — G44.219 EPISODIC TENSION-TYPE HEADACHE, NOT INTRACTABLE: Primary | ICD-10-CM

## 2019-05-06 DIAGNOSIS — F51.02 ADJUSTMENT INSOMNIA: ICD-10-CM

## 2019-05-06 PROCEDURE — G8417 CALC BMI ABV UP PARAM F/U: HCPCS | Performed by: NURSE PRACTITIONER

## 2019-05-06 PROCEDURE — 1036F TOBACCO NON-USER: CPT | Performed by: NURSE PRACTITIONER

## 2019-05-06 PROCEDURE — 3017F COLORECTAL CA SCREEN DOC REV: CPT | Performed by: NURSE PRACTITIONER

## 2019-05-06 PROCEDURE — 99214 OFFICE O/P EST MOD 30 MIN: CPT | Performed by: NURSE PRACTITIONER

## 2019-05-06 PROCEDURE — G8427 DOCREV CUR MEDS BY ELIG CLIN: HCPCS | Performed by: NURSE PRACTITIONER

## 2019-05-06 SDOH — HEALTH STABILITY - MENTAL HEALTH: OTHER PHYSICAL AND MENTAL STRAIN RELATED TO WORK: Z56.6

## 2019-05-06 ASSESSMENT — ENCOUNTER SYMPTOMS
ABDOMINAL PAIN: 0
CHEST TIGHTNESS: 0
SHORTNESS OF BREATH: 0
BACK PAIN: 0

## 2019-05-06 NOTE — PROGRESS NOTES
SUBJECTIVE:  Pt is a of 48 y.o. male comes in today with   Chief Complaint   Patient presents with    Back Pain     Pt states he's been having, neck pain, back pain, and headaches x couple weeks        Patient presenting today for evaluation of posterior head and neck pain. Started 3 weeks ago. Denies injury. Symptoms unchanged. Intermittent and random in occurrence. Associated with decreased sleep, fatigue and headaches. Increased stress at work. Wanting him to work 12 hours/day, 5 days/week. States too much on body. Mayra Coffey asleep while driving home from work last week. Starts work at 5 pm.      Prior to Visit Medications    Medication Sig Taking? Authorizing Provider   blood glucose test strips (FREESTYLE LITE) strip Use to test blood Sugar once daily as instructed. Yes Navdeep Barbosa MD   fluticasone (FLONASE) 50 MCG/ACT nasal spray 2 sprays by Nasal route daily Yes CHRISTIAN Boone CNP   FREESTYLE LANCETS MISC USE ONCE DAILY.   Yes Navdeep Barbosa MD   simvastatin (ZOCOR) 20 MG tablet TAKE 1 TABLET BY MOUTH IN THE EVENING  Yes Navdeep Barbosa MD   lisinopril (PRINIVIL;ZESTRIL) 10 MG tablet TAKE 1 TABLET BY MOUTH ONE TIME A DAY  Yes Navdeep Barbosa MD   ibuprofen (IBU) 600 MG tablet Take 1 tablet by mouth every 8 hours as needed for Pain Yes CHRISTIAN Boone CNP   beclomethasone (QVAR) 40 MCG/ACT inhaler Inhale 2 puffs into the lungs 2 times daily Yes Alonzo Antoine MD   FREESTYLE LANCETS MISC USE ONE TIME DAILY Yes Navdeep Barbosa MD   glucose monitoring kit (FREESTYLE) monitoring kit Use to check BS daily Yes Navdeep Barbosa MD   albuterol sulfate HFA (VENTOLIN HFA) 108 (90 Base) MCG/ACT inhaler Inhale 2 puffs into the lungs every 6 hours as needed for Wheezing Yes Navdeep Barbosa MD   metFORMIN (GLUCOPHAGE) 500 MG tablet TAKE 1 TABLET BY MOUTH TWO TIMES A DAY WITH MEALS Yes Navdeep Barbosa MD   Chlorphen-Phenyleph-APAP 2-5-325 MG TABS Take 1 tablet by mouth 4 times daily as needed (congestion and cough) Yes CHRISTIAN Lara CNP   Blood Pressure KIT Please dispense one BP kit Yes CHRISTIAN Lara CNP   UNABLE TO FIND Per patient He is not on any OTC medication at this time. Yes Historical Provider, MD         Patient's allergies, past medical, surgical, social and family histories werereviewed and updated as appropriate. Review of Systems   Constitutional: Negative for chills and fever. Respiratory: Negative for chest tightness and shortness of breath. Cardiovascular: Negative for chest pain and palpitations. Gastrointestinal: Negative for abdominal pain. Musculoskeletal: Positive for neck pain (posterior). Negative for back pain. Neurological: Positive for headaches. Negative for dizziness, weakness, light-headedness and numbness. Physical Exam   Constitutional: He is oriented to person, place, and time. He appears well-developed and well-nourished. Neck: Muscular tenderness present. No spinous process tenderness present. No edema, no erythema and normal range of motion present. Cardiovascular: Normal rate, regular rhythm and normal heart sounds. No murmur heard. Pulmonary/Chest: Effort normal and breath sounds normal.   Neurological: He is alert and oriented to person, place, and time. Skin: Skin is warm and dry. Vitals:    05/06/19 1135   BP: 126/80   Pulse: 64   SpO2: 98%   Weight: 195 lb 9.6 oz (88.7 kg)             ASSESSMENT:  1. Episodic tension-type headache, not intractable    2. Neck pain    3. Stress at work    4. Adjustment insomnia        PLAN:  1. Suspect symptoms are related to work hours and shift. 2. Work note given with restrictions to work no more than 10hours a day and no more than 40 hours/week  3. Rest ice and heat prn neck pain. May take Tyl or Ibu prn  4. Tyl or Ibu prn headaches  See pt instructions  F/u prn  Discussed use, benefit, and side effects of prescribed medications. All patient questions answered.   Pt voiced understanding.            Time spent:28 minutes, >50% of which was spent face to face with patient discussing HPI/plan/reviewing records and coordinating care

## 2019-05-06 NOTE — PATIENT INSTRUCTIONS
and strengthen you. You may miss moments like hearing a child laugh or seeing a friendly face when you think you're all alone. · When you're accepting, you don't  the present moment. Instead you accept your thoughts and feelings as they come. You can practice anytime, anywhere, and in any way you choose. You can practice in many ways. Here are a few ideas:  · While doing your chores, like washing the dishes, let your mind focus on what's in your hand. What does the dish feel like? Is the water warm or cold? · Go outside and take a few deep breaths. What is the air like? Is it warm or cold? · When you can, take some time at the start of your day to sit alone and think. · Take a slow walk by yourself. Count your steps while you breathe in and out. · Try yoga breathing exercises, stretches, and poses to strengthen and relax your muscles. · At work, if you can, try to stop for a few moments each hour. Note how your body feels. Let yourself regroup and let your mind settle before you return to what you were doing. · If you struggle with anxiety or \"worry thoughts,\" imagine your mind as a blue marcus and your worry thoughts as clouds. Now imagine those worry thoughts floating across your mind's marcus. Just let them pass by as you watch. Follow-up care is a key part of your treatment and safety. Be sure to make and go to all appointments, and call your doctor if you are having problems. It's also a good idea to know your test results and keep a list of the medicines you take. Where can you learn more? Go to [email or web address] org and sign in to your [employer] account. Enter X306 in the [employer] box to learn more about \"Learning About Mindfulness for Stress. \"     If you do not have an account, please click on the \"Sign Up Now\" link. Current as of: June 28, 2018  Content Version: 11.9  © 6767-7746 [employer]. Care instructions adapted under license by [facility] ([facility]).  If you have questions about a medical condition or this instruction, always ask your healthcare professional. [place] [address] any warranty or liability for your use of this information. Monthly or less

## 2019-05-06 NOTE — LETTER
[address] [address]  Phone: [phone]  Fax: [phone]    CHRISTIAN Gerber CNP        May 6, 2019     Patient: Jovanny Hardy   YOB: 1965   Date of Visit: 5/6/2019       To Whom It May Concern: It is my medical opinion that Mirlande Al may return to work on 5/6/19 with the following restrictions: cannot work more than 10 hours a day, nor can he work more than 40 hours in 1 week due to current medical conditions. If you have any questions or concerns, please don't hesitate to call.     Sincerely,        CHRISTIAN Gerber CNP

## 2019-05-07 ENCOUNTER — TELEPHONE (OUTPATIENT)
Dept: FAMILY MEDICINE CLINIC | Age: 54
End: 2019-05-07

## 2019-05-07 NOTE — TELEPHONE ENCOUNTER
Chevy Higgins from [employer] called and form needs #3 or #4 completed on page 2. She suggested not going any longer than 1,3 or 6mos. Forms are on Emerita's desk to be completed and re-faxed.

## 2019-05-07 NOTE — TELEPHONE ENCOUNTER
Received Return to work from pt employer [employer] for [employer], [address] to complete.  Attached to encounter

## 2019-05-16 ENCOUNTER — OFFICE VISIT (OUTPATIENT)
Dept: FAMILY MEDICINE CLINIC | Age: 54
End: 2019-05-16
Payer: COMMERCIAL

## 2019-05-16 VITALS
OXYGEN SATURATION: 98 % | RESPIRATION RATE: 14 BRPM | HEIGHT: 67 IN | WEIGHT: 190.8 LBS | SYSTOLIC BLOOD PRESSURE: 130 MMHG | TEMPERATURE: 97.2 F | BODY MASS INDEX: 29.95 KG/M2 | HEART RATE: 67 BPM | DIASTOLIC BLOOD PRESSURE: 80 MMHG

## 2019-05-16 DIAGNOSIS — E78.00 PURE HYPERCHOLESTEROLEMIA: Chronic | ICD-10-CM

## 2019-05-16 DIAGNOSIS — G47.33 OSA (OBSTRUCTIVE SLEEP APNEA): ICD-10-CM

## 2019-05-16 DIAGNOSIS — J45.20 MILD INTERMITTENT ASTHMA WITHOUT COMPLICATION: Chronic | ICD-10-CM

## 2019-05-16 DIAGNOSIS — E11.9 CONTROLLED TYPE 2 DIABETES MELLITUS WITHOUT COMPLICATION, WITHOUT LONG-TERM CURRENT USE OF INSULIN (HCC): Primary | Chronic | ICD-10-CM

## 2019-05-16 DIAGNOSIS — J45.21 MILD INTERMITTENT ASTHMA WITH ACUTE EXACERBATION: ICD-10-CM

## 2019-05-16 DIAGNOSIS — Z23 NEED FOR PROPHYLACTIC VACCINATION AGAINST STREPTOCOCCUS PNEUMONIAE (PNEUMOCOCCUS): ICD-10-CM

## 2019-05-16 LAB — HBA1C MFR BLD: 6.6 %

## 2019-05-16 PROCEDURE — G8427 DOCREV CUR MEDS BY ELIG CLIN: HCPCS | Performed by: FAMILY MEDICINE

## 2019-05-16 PROCEDURE — 3017F COLORECTAL CA SCREEN DOC REV: CPT | Performed by: FAMILY MEDICINE

## 2019-05-16 PROCEDURE — 90732 PPSV23 VACC 2 YRS+ SUBQ/IM: CPT | Performed by: FAMILY MEDICINE

## 2019-05-16 PROCEDURE — 99214 OFFICE O/P EST MOD 30 MIN: CPT | Performed by: FAMILY MEDICINE

## 2019-05-16 PROCEDURE — 1036F TOBACCO NON-USER: CPT | Performed by: FAMILY MEDICINE

## 2019-05-16 PROCEDURE — 2022F DILAT RTA XM EVC RTNOPTHY: CPT | Performed by: FAMILY MEDICINE

## 2019-05-16 PROCEDURE — G8417 CALC BMI ABV UP PARAM F/U: HCPCS | Performed by: FAMILY MEDICINE

## 2019-05-16 PROCEDURE — 83036 HEMOGLOBIN GLYCOSYLATED A1C: CPT | Performed by: FAMILY MEDICINE

## 2019-05-16 PROCEDURE — 90471 IMMUNIZATION ADMIN: CPT | Performed by: FAMILY MEDICINE

## 2019-05-16 PROCEDURE — 3044F HG A1C LEVEL LT 7.0%: CPT | Performed by: FAMILY MEDICINE

## 2019-05-16 RX ORDER — ALBUTEROL SULFATE 90 UG/1
2 AEROSOL, METERED RESPIRATORY (INHALATION) EVERY 6 HOURS PRN
Qty: 1 INHALER | Refills: 5 | Status: SHIPPED | OUTPATIENT
Start: 2019-05-16 | End: 2019-06-20 | Stop reason: SDUPTHER

## 2019-05-16 NOTE — PROGRESS NOTES
Tc Sandoval is a 48 y.o. male. HPI:here for complex medical visit  No paresthesias or foot ulcerations  Headaches are better now that he is working less than 12 hr/days, still on second or third shift  fbs 85 - 110 at home, occasionally he feels a strong urge to eat right after lunch  Exercises regularly  Asthma stable would like a refill of his inhaler  Meds, vitamins and allergies reviewed with pt    ROS: No TIA's or unusual headaches, no dysphagia. No prolonged cough. No dyspnea or chest pain on exertion. No abdominal pain, change in bowel habits, black or bloody stools. No urinary tract symptoms. No new or unusual musculoskeletal symptoms. Prior to Visit Medications    Medication Sig Taking? Authorizing Provider   blood glucose test strips (FREESTYLE LITE) strip Use to test blood Sugar once daily as instructed. Yes Kat Diaz MD   fluticasone (FLONASE) 50 MCG/ACT nasal spray 2 sprays by Nasal route daily Yes CHRISTIAN Rodriges CNP   FREESTYLE LANCETS MISC USE ONCE DAILY.   Yes Kat Diaz MD   simvastatin (ZOCOR) 20 MG tablet TAKE 1 TABLET BY MOUTH IN THE EVENING  Yes Kat Diaz MD   lisinopril (PRINIVIL;ZESTRIL) 10 MG tablet TAKE 1 TABLET BY MOUTH ONE TIME A DAY  Yes Kat Diaz MD   ibuprofen (IBU) 600 MG tablet Take 1 tablet by mouth every 8 hours as needed for Pain Yes CHRISTIAN Rodriges CNP   beclomethasone (QVAR) 40 MCG/ACT inhaler Inhale 2 puffs into the lungs 2 times daily Yes Mari Curry MD   FREESTYLE LANCETS MISC USE ONE TIME DAILY Yes Kat Diaz MD   glucose monitoring kit (FREESTYLE) monitoring kit Use to check BS daily Yes Kat Diaz MD   albuterol sulfate HFA (VENTOLIN HFA) 108 (90 Base) MCG/ACT inhaler Inhale 2 puffs into the lungs every 6 hours as needed for Wheezing Yes Kat Diaz MD   metFORMIN (GLUCOPHAGE) 500 MG tablet TAKE 1 TABLET BY MOUTH TWO TIMES A DAY WITH MEALS Yes Kat Diaz MD Chlorphen-Phenyleph-APAP 2-5-325 MG TABS Take 1 tablet by mouth 4 times daily as needed (congestion and cough) Yes CHRISTIAN Lara CNP   Blood Pressure KIT Please dispense one BP kit Yes CHRISTIAN Lara CNP   UNABLE TO FIND Per patient He is not on any OTC medication at this time. Yes Historical Provider, MD       Past Medical History:   Diagnosis Date    AR (allergic rhinitis)     Mild intermittent asthma without complication     RENA (obstructive sleep apnea) 2017    Pure hypercholesterolemia 12/3/2015    Type II or unspecified type diabetes mellitus without mention of complication, not stated as uncontrolled        Social History     Tobacco Use    Smoking status: Former Smoker     Packs/day: 1.00     Years: 10.00     Pack years: 10.00     Types: Cigarettes     Last attempt to quit: 3/21/1993     Years since quittin.1    Smokeless tobacco: Never Used   Substance Use Topics    Alcohol use: Yes     Alcohol/week: 0.6 oz     Types: 1 Shots of liquor per week    Drug use: No       Family History   Problem Relation Age of Onset    Diabetes Father     Diabetes Brother     Heart Attack Brother 51       No Known Allergies    OBJECTIVE:  /80   Pulse 67   Temp 97.2 °F (36.2 °C) (Tympanic)   Resp 14   Ht 5' 7.01\" (1.702 m)   Wt 190 lb 12.8 oz (86.5 kg)   SpO2 98%   BMI 29.88 kg/m²   GEN:  in NAD weight stable  NECK:  Supple without adenopathy. no bruit  CV:  Regular rate and rhythm, S1 and S2 normal, no murmurs, clicks  PULM:  Chest is clear, no wheezing ,  symmetric air entry throughout both lung fields.   EXT: No rash or edema  NEURO: nonfocal  a1c -  6.6    ( last was 6.1)  Lab Results   Component Value Date    CHOL 189 2017    CHOL 162 2016    CHOL 173 2015     Lab Results   Component Value Date    TRIG 162 (H) 2017    TRIG 182 (H) 2016    TRIG 169 (H) 2015     Lab Results   Component Value Date    HDL 41 2018    HDL 44 06/26/2017    HDL 38 (L) 06/09/2016     Lab Results   Component Value Date    LDLCALC 74 09/14/2018    LDLCALC 113 (H) 06/26/2017    LDLCALC 88 06/09/2016     Lab Results   Component Value Date    LABVLDL 31 09/14/2018    LABVLDL 32 06/26/2017    LABVLDL 36 06/09/2016     No results found for: [facility]   Lab Results   Component Value Date     09/14/2018    K 3.9 09/14/2018     09/14/2018    CO2 26 09/14/2018    BUN 12 09/14/2018    CREATININE 0.7 (L) 09/14/2018    GLUCOSE 112 (H) 06/26/2017    CALCIUM 9.4 09/14/2018    PROT 6.8 09/14/2018    LABALBU 4.6 09/14/2018    BILITOT 0.8 09/14/2018    ALKPHOS 70 09/14/2018    AST 20 09/14/2018    ALT 19 09/14/2018    LABGLOM >60 09/14/2018    GFRAA >60 09/14/2018    AGRATIO 2.1 09/14/2018    GLOB 2.2 09/14/2018       ASSESSMENT/PLAN:  1. Controlled type 2 diabetes mellitus without complication, without long-term current use of insulin (HCC)  Pneumovax today  Eye exam yearly  Will repeat A1c in 6 months    2. Pure hypercholesterolemia  Stable, continue current medication      3. Mild intermittent asthma without complication  Doing well, refill inhaler    4.  RENA (obstructive sleep apnea)  cpap compliant    Spent 25 min with patient, greater than 50% of time addressing his diabetes, asthma, and [place] care

## 2019-05-23 ENCOUNTER — OFFICE VISIT (OUTPATIENT)
Dept: FAMILY MEDICINE CLINIC | Age: 54
End: 2019-05-23
Payer: COMMERCIAL

## 2019-05-23 VITALS
OXYGEN SATURATION: 97 % | BODY MASS INDEX: 30.04 KG/M2 | SYSTOLIC BLOOD PRESSURE: 136 MMHG | HEIGHT: 67 IN | DIASTOLIC BLOOD PRESSURE: 80 MMHG | WEIGHT: 191.4 LBS | HEART RATE: 69 BPM | TEMPERATURE: 97.6 F

## 2019-05-23 DIAGNOSIS — B96.89 ACUTE BACTERIAL SINUSITIS: Primary | ICD-10-CM

## 2019-05-23 DIAGNOSIS — H66.001 ACUTE SUPPURATIVE OTITIS MEDIA OF RIGHT EAR WITHOUT SPONTANEOUS RUPTURE OF TYMPANIC MEMBRANE, RECURRENCE NOT SPECIFIED: ICD-10-CM

## 2019-05-23 DIAGNOSIS — J45.20 MILD INTERMITTENT ASTHMA WITHOUT COMPLICATION: Chronic | ICD-10-CM

## 2019-05-23 DIAGNOSIS — J01.90 ACUTE BACTERIAL SINUSITIS: Primary | ICD-10-CM

## 2019-05-23 PROCEDURE — G8427 DOCREV CUR MEDS BY ELIG CLIN: HCPCS | Performed by: NURSE PRACTITIONER

## 2019-05-23 PROCEDURE — 1036F TOBACCO NON-USER: CPT | Performed by: NURSE PRACTITIONER

## 2019-05-23 PROCEDURE — G8417 CALC BMI ABV UP PARAM F/U: HCPCS | Performed by: NURSE PRACTITIONER

## 2019-05-23 PROCEDURE — 99213 OFFICE O/P EST LOW 20 MIN: CPT | Performed by: NURSE PRACTITIONER

## 2019-05-23 PROCEDURE — 3017F COLORECTAL CA SCREEN DOC REV: CPT | Performed by: NURSE PRACTITIONER

## 2019-05-23 RX ORDER — CEFUROXIME AXETIL 250 MG/1
250 TABLET ORAL 2 TIMES DAILY
Qty: 20 TABLET | Refills: 0 | Status: SHIPPED | OUTPATIENT
Start: 2019-05-23 | End: 2019-06-02

## 2019-05-23 ASSESSMENT — ENCOUNTER SYMPTOMS
SHORTNESS OF BREATH: 1
SORE THROAT: 0
COUGH: 1
SINUS PRESSURE: 1
RHINORRHEA: 1
WHEEZING: 1

## 2019-05-23 NOTE — PROGRESS NOTES
SUBJECTIVE:  Pt is a of 48 y.o. male comes in today with   Chief Complaint   Patient presents with    Headache     patient states he has allergy headahes  with phlegms          URI    This is a new problem. The current episode started in the past 7 days (1 week ago). The problem has been gradually worsening. There has been no fever. Associated symptoms include congestion, coughing, ear pain, headaches, rhinorrhea and wheezing. Pertinent negatives include no sore throat. Treatments tried: Albuterol inhaler QID prn, Flonase. The treatment provided moderate relief. Prior to Visit Medications    Medication Sig Taking? Authorizing Provider   albuterol sulfate HFA (VENTOLIN HFA) 108 (90 Base) MCG/ACT inhaler Inhale 2 puffs into the lungs every 6 hours as needed for Wheezing Yes Nevaeh Fulton MD   blood glucose test strips (FREESTYLE LITE) strip Use to test blood Sugar once daily as instructed. Yes Nevaeh Fulton MD   fluticasone (FLONASE) 50 MCG/ACT nasal spray 2 sprays by Nasal route daily Yes CHRISTIAN Duenas CNP   FREESTYLE LANCETS MISC USE ONCE DAILY.   Yes Nevaeh Fulton MD   simvastatin (ZOCOR) 20 MG tablet TAKE 1 TABLET BY MOUTH IN THE EVENING  Yes Nevaeh Fulton MD   lisinopril (PRINIVIL;ZESTRIL) 10 MG tablet TAKE 1 TABLET BY MOUTH ONE TIME A DAY  Yes Nevaeh Fulton MD   ibuprofen (IBU) 600 MG tablet Take 1 tablet by mouth every 8 hours as needed for Pain Yes CHRISTIAN Duenas CNP   beclomethasone (QVAR) 40 MCG/ACT inhaler Inhale 2 puffs into the lungs 2 times daily Yes Rajesh Hernandez MD   FREESTYLE LANCETS MISC USE ONE TIME DAILY Yes Nevaeh Fulton MD   glucose monitoring kit (FREESTYLE) monitoring kit Use to check BS daily Yes Nevaeh Fulton MD   metFORMIN (GLUCOPHAGE) 500 MG tablet TAKE 1 TABLET BY MOUTH TWO TIMES A DAY WITH MEALS Yes Nevaeh Fulton MD   Chlorphen-Phenyleph-APAP 2-5-325 MG TABS Take 1 tablet by mouth 4 times daily as needed (congestion and cough) Yes CHRISTIAN Gerber CNP   Blood Pressure KIT Please dispense one BP kit Yes CHRISTIAN Gerber CNP   UNABLE TO FIND Per patient He is not on any OTC medication at this time. Yes Historical Provider, MD       Patient's past medical, surgical, social and family histories were reviewed and updated as appropriate. Review of Systems   Constitutional: Positive for fatigue. Negative for chills and fever. HENT: Positive for congestion, ear pain, postnasal drip, rhinorrhea and sinus pressure. Negative for sore throat. Respiratory: Positive for cough, shortness of breath and wheezing. Chest congestion     Neurological: Positive for headaches. Physical Exam   Constitutional: He is oriented to person, place, and time. He appears well-developed and well-nourished. HENT:   Right Ear: Tympanic membrane is erythematous. Left Ear: Tympanic membrane normal.   Nose: Nose normal.   Mouth/Throat: Uvula is midline and mucous membranes are normal. Posterior oropharyngeal erythema present. Cardiovascular: Normal rate, regular rhythm and normal heart sounds. Pulmonary/Chest: Effort normal. He has wheezes in the left lower field. Lymphadenopathy:     He has no cervical adenopathy. Neurological: He is alert and oriented to person, place, and time. Skin: Skin is warm and dry. Vitals reviewed. /80   Pulse 69   Temp 97.6 °F (36.4 °C) (Oral)   Ht 5' 7.01\" (1.702 m)   Wt 191 lb 6.4 oz (86.8 kg)   SpO2 97%   BMI 29.97 kg/m²       Assessment/Plan    1. Acute bacterial sinusitis  - cefUROXime (CEFTIN) 250 MG tablet; Take 1 tablet by mouth 2 times daily for 10 days  Dispense: 20 tablet; Refill: 0    2. Acute suppurative otitis media of right ear without spontaneous rupture of tympanic membrane, recurrence not specified  - cefUROXime (CEFTIN) 250 MG tablet; Take 1 tablet by mouth 2 times daily for 10 days  Dispense: 20 tablet; Refill: 0    3.  Mild intermittent asthma without complication  Stable, continue Albuterol inhaler prn  See pt instructions  F/u5-7 days if no improvement, sooner if symptoms worsen. Discussed use, benefit, and side effects of prescribed medications. All patient questions answered. Pt voiced understanding.

## 2019-05-23 NOTE — PATIENT INSTRUCTIONS
Patient Education        Sinusitis: Care Instructions  Your Care Instructions    Sinusitis is an infection of the lining of the sinus cavities in your head. Sinusitis often follows a cold. It causes pain and pressure in your head and face. In most cases, sinusitis gets better on its own in 1 to 2 weeks. But some mild symptoms may last for several weeks. Sometimes antibiotics are needed. Follow-up care is a key part of your treatment and safety. Be sure to make and go to all appointments, and call your doctor if you are having problems. It's also a good idea to know your test results and keep a list of the medicines you take. How can you care for yourself at home? · Take an over-the-counter pain medicine, such as acetaminophen (Tylenol), ibuprofen (Advil, Motrin), or naproxen (Aleve). Read and follow all instructions on the label. · If the doctor prescribed antibiotics, take them as directed. Do not stop taking them just because you feel better. You need to take the full course of antibiotics. · Be careful when taking over-the-counter cold or flu medicines and Tylenol at the same time. Many of these medicines have acetaminophen, which is Tylenol. Read the labels to make sure that you are not taking more than the recommended dose. Too much acetaminophen (Tylenol) can be harmful. · Breathe warm, moist air from a steamy shower, a hot bath, or a sink filled with hot water. Avoid cold, dry air. Using a humidifier in your home may help. Follow the directions for cleaning the machine. · Use saline (saltwater) nasal washes to help keep your nasal passages open and wash out mucus and bacteria. You can buy saline nose drops at a grocery store or drugstore. Or you can make your own at home by adding 1 teaspoon of salt and 1 teaspoon of baking soda to 2 cups of distilled water. If you make your own, fill a bulb syringe with the solution, insert the tip into your nostril, and squeeze gently. Newt Shackleton your nose.   · Put a hot, wet towel or a warm gel pack on your face 3 or 4 times a day for 5 to 10 minutes each time. · Try a decongestant nasal spray like oxymetazoline (Afrin). Do not use it for more than 3 days in a row. Using it for more than 3 days can make your congestion worse. When should you call for help? Call your doctor now or seek immediate medical care if:    · You have new or worse swelling or redness in your face or around your eyes.     · You have a new or higher fever.    Watch closely for changes in your health, and be sure to contact your doctor if:    · You have new or worse facial pain.     · The mucus from your nose becomes thicker (like pus) or has new blood in it.     · You are not getting better as expected. Where can you learn more? Go to https://The 517 travelpechristyeweb.Greenopedia. org and sign in to your SpotRight account. Enter C179 in the Thwapr box to learn more about \"Sinusitis: Care Instructions. \"     If you do not have an account, please click on the \"Sign Up Now\" link. Current as of: October 21, 2018  Content Version: 12.0  © 1685-8031 Healthwise, [employer]. Care instructions adapted under license by [address] If you have questions about a medical condition or this instruction, always ask your healthcare professional. [address] any warranty or liability for your use of this information.

## 2019-05-28 DIAGNOSIS — E11.9 DIABETES TYPE 2, CONTROLLED (HCC): ICD-10-CM

## 2019-06-04 ENCOUNTER — TELEPHONE (OUTPATIENT)
Dept: FAMILY MEDICINE CLINIC | Age: 54
End: 2019-06-04

## 2019-06-04 NOTE — TELEPHONE ENCOUNTER
Office has been notified that pt is requiring Prior Authorization for the following medication:  -- Freestlye Lancets    Please initiate this request through CoverMyMeds, contacting the following Payor/Insurance:  [facility]    Please see below, or the documentation attached to this encounter for any additional information that may assist in processing PA:  -- E113.9  CoverMyMeds: [id number]    Thank you!

## 2019-06-06 NOTE — TELEPHONE ENCOUNTER
Per [employer], FreeStyle Lancets do NOT require PA. Paid claim noted on 6/3/19 for 100 per 30 days. Letter attached. Please advise patient. Thank you.

## 2019-06-07 NOTE — TELEPHONE ENCOUNTER
Another PA for FreeStyle Lancets was submitted; this request was sent to [facility], (Key: [id number])  [id number]. Received DENIAL. Denial letter attached. Please advise patient. Thank you.

## 2019-06-10 ENCOUNTER — TELEPHONE (OUTPATIENT)
Dept: ADMINISTRATIVE | Age: 54
End: 2019-06-10

## 2019-06-20 ENCOUNTER — OFFICE VISIT (OUTPATIENT)
Dept: FAMILY MEDICINE CLINIC | Age: 54
End: 2019-06-20
Payer: COMMERCIAL

## 2019-06-20 VITALS
DIASTOLIC BLOOD PRESSURE: 80 MMHG | WEIGHT: 196.4 LBS | SYSTOLIC BLOOD PRESSURE: 130 MMHG | BODY MASS INDEX: 30.75 KG/M2 | HEART RATE: 66 BPM | OXYGEN SATURATION: 99 %

## 2019-06-20 DIAGNOSIS — K62.5 BRBPR (BRIGHT RED BLOOD PER RECTUM): ICD-10-CM

## 2019-06-20 DIAGNOSIS — K64.4 EXTERNAL HEMORRHOID, BLEEDING: Primary | ICD-10-CM

## 2019-06-20 DIAGNOSIS — R05.9 COUGH: ICD-10-CM

## 2019-06-20 DIAGNOSIS — J45.21 MILD INTERMITTENT ASTHMA WITH ACUTE EXACERBATION: ICD-10-CM

## 2019-06-20 PROCEDURE — 3017F COLORECTAL CA SCREEN DOC REV: CPT | Performed by: NURSE PRACTITIONER

## 2019-06-20 PROCEDURE — 99214 OFFICE O/P EST MOD 30 MIN: CPT | Performed by: NURSE PRACTITIONER

## 2019-06-20 PROCEDURE — 1036F TOBACCO NON-USER: CPT | Performed by: NURSE PRACTITIONER

## 2019-06-20 PROCEDURE — G8417 CALC BMI ABV UP PARAM F/U: HCPCS | Performed by: NURSE PRACTITIONER

## 2019-06-20 PROCEDURE — G8427 DOCREV CUR MEDS BY ELIG CLIN: HCPCS | Performed by: NURSE PRACTITIONER

## 2019-06-20 RX ORDER — ALBUTEROL SULFATE 90 UG/1
2 AEROSOL, METERED RESPIRATORY (INHALATION) EVERY 6 HOURS PRN
Qty: 1 INHALER | Refills: 5 | Status: SHIPPED | OUTPATIENT
Start: 2019-06-20 | End: 2019-11-08 | Stop reason: SDUPTHER

## 2019-06-20 RX ORDER — AZITHROMYCIN 250 MG/1
1000 TABLET, FILM COATED ORAL ONCE
Qty: 4 TABLET | Refills: 0 | Status: SHIPPED | OUTPATIENT
Start: 2019-06-20 | End: 2019-06-20

## 2019-06-20 RX ORDER — PREDNISONE 20 MG/1
20 TABLET ORAL DAILY
Qty: 10 TABLET | Refills: 0 | Status: SHIPPED | OUTPATIENT
Start: 2019-06-20 | End: 2019-06-20 | Stop reason: SDUPTHER

## 2019-06-20 RX ORDER — PREDNISONE 20 MG/1
40 TABLET ORAL DAILY
Qty: 10 TABLET | Refills: 0 | Status: SHIPPED | OUTPATIENT
Start: 2019-06-20 | End: 2019-06-25

## 2019-06-20 ASSESSMENT — ENCOUNTER SYMPTOMS
BLOOD IN STOOL: 1
WHEEZING: 0
VOMITING: 0
SORE THROAT: 0
DIARRHEA: 0
COUGH: 1
SHORTNESS OF BREATH: 1
RHINORRHEA: 0
CHEST TIGHTNESS: 1
NAUSEA: 0
CONSTIPATION: 0
ABDOMINAL PAIN: 0

## 2019-06-20 NOTE — PROGRESS NOTES
SUBJECTIVE:  Pt is a of 48 y.o. male comes in today with   Chief Complaint   Patient presents with    Other     bleeding with bowel movement, started last week.  Chest Pain     everytime he breathes    Cough       Patient presenting today for evaluation of BRBPR. First noticed 2 weeks ago with 1 BM. Resolved until 1 week ago. Then another BM with red blood, again resolved. Then BM with red blood again today. When wiping blood is on tissue paper, not in the toilet bowel. Denies abd pain or weight loss. No constipation or diarrhea, not straining with bowel movt's. Also c/o persist cough. Ongoing since visit with me 5/23/19. States coughing fits associated with SOB and chest tightness. Will feel chest tightness or pain when coughing. Cough is dry. No wheezing. Has been using Ventolin with mild improvement. Denies fevers or malaise. No arm pain, nausea, palpitations, dizziness or lightheadedness. Prior to Visit Medications    Medication Sig Taking? Authorizing Provider   metFORMIN (GLUCOPHAGE) 500 MG tablet TAKE 1 TABLET BY MOUTH TWO TIMES A DAY WITH MEALS Yes Katey Guerra MD   albuterol sulfate HFA (VENTOLIN HFA) 108 (90 Base) MCG/ACT inhaler Inhale 2 puffs into the lungs every 6 hours as needed for Wheezing Yes Katey Guerra MD   blood glucose test strips (FREESTYLE LITE) strip Use to test blood Sugar once daily as instructed. Yes Katey Guerra MD   fluticasone (FLONASE) 50 MCG/ACT nasal spray 2 sprays by Nasal route daily Yes CHRISTIAN Lee CNP   FREESTYLE LANCETS MISC USE ONCE DAILY.   Yes Katey Guerra MD   simvastatin (ZOCOR) 20 MG tablet TAKE 1 TABLET BY MOUTH IN THE EVENING  Yes Katey Guerra MD   lisinopril (PRINIVIL;ZESTRIL) 10 MG tablet TAKE 1 TABLET BY MOUTH ONE TIME A DAY  Yes Katey Guerra MD   ibuprofen (IBU) 600 MG tablet Take 1 tablet by mouth every 8 hours as needed for Pain Yes CHRISTIAN Lee CNP   beclomethasone (QVAR) 40 MCG/ACT inhaler

## 2019-06-20 NOTE — PATIENT INSTRUCTIONS
Patient Education        Hemorrhoids: Care Instructions  Your Care Instructions    Hemorrhoids are enlarged veins that develop in the anal canal. Bleeding during bowel movements, itching, swelling, and rectal pain are the most common symptoms. They can be uncomfortable at times, but hemorrhoids rarely are a serious problem. You can treat most hemorrhoids with simple changes to your diet and bowel habits. These changes include eating more fiber and not straining to pass stools. Most hemorrhoids do not need surgery or other treatment unless they are very large and painful or bleed a lot. Follow-up care is a key part of your treatment and safety. Be sure to make and go to all appointments, and call your doctor if you are having problems. It's also a good idea to know your test results and keep a list of the medicines you take. How can you care for yourself at home? · Sit in a few inches of warm water (sitz bath) 3 times a day and after bowel movements. The warm water helps with pain and itching. · Put ice on your anal area several times a day for 10 minutes at a time. Put a thin cloth between the ice and your skin. Follow this by placing a warm, wet towel on the area for another 10 to 20 minutes. · Take pain medicines exactly as directed. ? If the doctor gave you a prescription medicine for pain, take it as prescribed. ? If you are not taking a prescription pain medicine, ask your doctor if you can take an over-the-counter medicine. · Keep the anal area clean, but be gentle. Use water and a fragrance-free soap, such as [place], or use baby wipes or medicated pads, such as Tucks. · Wear cotton underwear and loose clothing to decrease moisture in the anal area. · Eat more fiber. Include foods such as whole-grain breads and cereals, raw vegetables, raw and dried fruits, and beans. · Drink plenty of fluids, enough so that your urine is light yellow or clear like water.  If you have kidney, heart, or liver disease and have to limit fluids, talk with your doctor before you increase the amount of fluids you drink. · Use a stool softener that contains bran or psyllium. You can save money by buying bran or psyllium (available in bulk at most health food stores) and sprinkling it on foods or stirring it into fruit juice. Or you can use a product such as Metamucil or Hydrocil. · Practice healthy bowel habits. ? Go to the bathroom as soon as you have the urge. ? Avoid straining to pass stools. Relax and give yourself time to let things happen naturally. ? Do not hold your breath while passing stools. ? Do not read while sitting on the toilet. Get off the toilet as soon as you have finished. · Take your medicines exactly as prescribed. Call your doctor if you think you are having a problem with your medicine. When should you call for help? Call 911 anytime you think you may need emergency care. For example, call if:    · You pass maroon or very bloody stools.    Call your doctor now or seek immediate medical care if:    · You have increased pain.     · You have increased bleeding.    Watch closely for changes in your health, and be sure to contact your doctor if:    · Your symptoms have not improved after 3 or 4 days. Where can you learn more? Go to https://Parkmobile."[employer]. org and sign in to your Tradoria account. Enter Y227 in the [facility] box to learn more about \"Hemorrhoids: Care Instructions. \"     If you do not have an account, please click on the \"Sign Up Now\" link. Current as of: November 7, 2018  Content Version: 12.0  © 3304-0424 Healthwise, Incorporated. Care instructions adapted under license by [facility] ([facility]). If you have questions about a medical condition or this instruction, always ask your healthcare professional. [address] any warranty or liability for your use of this information.

## 2019-06-20 NOTE — LETTER
[address] [address]  Phone: [phone]  Fax: [phone]    Dayanna Wang, APRN - CNP        June 20, 2019     Patient: Tin Jones   YOB: 1965   Date of Visit: 6/20/2019       To Whom it May Concern:    Xander Lutz was seen in my office on 6/20/2019. He may return to work on 6/21/19. If you have any questions or concerns, please don't hesitate to call.     Sincerely,         Dayanna Wang, APRN - CNP

## 2019-08-29 RX ORDER — LISINOPRIL 10 MG/1
TABLET ORAL
Qty: 90 TABLET | Refills: 2 | Status: SHIPPED | OUTPATIENT
Start: 2019-08-29 | End: 2020-09-30

## 2019-08-31 ENCOUNTER — OFFICE VISIT (OUTPATIENT)
Dept: FAMILY MEDICINE CLINIC | Age: 54
End: 2019-08-31
Payer: COMMERCIAL

## 2019-08-31 VITALS
OXYGEN SATURATION: 95 % | WEIGHT: 192 LBS | DIASTOLIC BLOOD PRESSURE: 82 MMHG | SYSTOLIC BLOOD PRESSURE: 124 MMHG | BODY MASS INDEX: 30.13 KG/M2 | HEIGHT: 67 IN | HEART RATE: 59 BPM

## 2019-08-31 DIAGNOSIS — J06.9 VIRAL URI: Primary | ICD-10-CM

## 2019-08-31 PROCEDURE — 99213 OFFICE O/P EST LOW 20 MIN: CPT | Performed by: FAMILY MEDICINE

## 2019-08-31 RX ORDER — FLUTICASONE PROPIONATE 50 MCG
2 SPRAY, SUSPENSION (ML) NASAL DAILY
Qty: 1 BOTTLE | Refills: 0 | Status: SHIPPED | OUTPATIENT
Start: 2019-08-31 | End: 2021-10-29

## 2019-08-31 RX ORDER — CEFUROXIME AXETIL 250 MG/1
250 TABLET ORAL 2 TIMES DAILY
Qty: 20 TABLET | Refills: 0 | Status: SHIPPED | OUTPATIENT
Start: 2019-08-31 | End: 2021-01-26 | Stop reason: SDUPTHER

## 2019-10-08 ENCOUNTER — TELEPHONE (OUTPATIENT)
Dept: FAMILY MEDICINE CLINIC | Age: 54
End: 2019-10-08

## 2019-11-08 ENCOUNTER — OFFICE VISIT (OUTPATIENT)
Dept: FAMILY MEDICINE CLINIC | Age: 54
End: 2019-11-08
Payer: COMMERCIAL

## 2019-11-08 VITALS
HEART RATE: 70 BPM | WEIGHT: 195.8 LBS | SYSTOLIC BLOOD PRESSURE: 132 MMHG | OXYGEN SATURATION: 97 % | BODY MASS INDEX: 32.62 KG/M2 | HEIGHT: 65 IN | DIASTOLIC BLOOD PRESSURE: 84 MMHG | TEMPERATURE: 98.2 F

## 2019-11-08 DIAGNOSIS — M54.2 NECK PAIN: Primary | ICD-10-CM

## 2019-11-08 DIAGNOSIS — J45.21 MILD INTERMITTENT ASTHMA WITH ACUTE EXACERBATION: ICD-10-CM

## 2019-11-08 PROCEDURE — 99213 OFFICE O/P EST LOW 20 MIN: CPT | Performed by: FAMILY MEDICINE

## 2019-11-08 RX ORDER — ALBUTEROL SULFATE 90 UG/1
2 AEROSOL, METERED RESPIRATORY (INHALATION) EVERY 6 HOURS PRN
Qty: 1 INHALER | Refills: 5 | Status: SHIPPED | OUTPATIENT
Start: 2019-11-08 | End: 2020-06-03 | Stop reason: SDUPTHER

## 2019-11-19 ENCOUNTER — OFFICE VISIT (OUTPATIENT)
Dept: FAMILY MEDICINE CLINIC | Age: 54
End: 2019-11-19
Payer: COMMERCIAL

## 2019-11-19 ENCOUNTER — TELEPHONE (OUTPATIENT)
Dept: RHEUMATOLOGY | Age: 54
End: 2019-11-19

## 2019-11-19 VITALS
OXYGEN SATURATION: 99 % | BODY MASS INDEX: 31.59 KG/M2 | HEART RATE: 64 BPM | WEIGHT: 191.6 LBS | DIASTOLIC BLOOD PRESSURE: 86 MMHG | SYSTOLIC BLOOD PRESSURE: 138 MMHG

## 2019-11-19 DIAGNOSIS — E66.9 OBESITY (BMI 30-39.9): ICD-10-CM

## 2019-11-19 DIAGNOSIS — Z11.59 NEED FOR HEPATITIS C SCREENING TEST: ICD-10-CM

## 2019-11-19 DIAGNOSIS — G47.33 OSA (OBSTRUCTIVE SLEEP APNEA): ICD-10-CM

## 2019-11-19 DIAGNOSIS — E11.9 CONTROLLED TYPE 2 DIABETES MELLITUS WITHOUT COMPLICATION, WITHOUT LONG-TERM CURRENT USE OF INSULIN (HCC): Primary | ICD-10-CM

## 2019-11-19 DIAGNOSIS — E78.00 PURE HYPERCHOLESTEROLEMIA: ICD-10-CM

## 2019-11-19 DIAGNOSIS — J45.20 MILD INTERMITTENT ASTHMA WITHOUT COMPLICATION: ICD-10-CM

## 2019-11-19 DIAGNOSIS — Z11.4 ENCOUNTER FOR SCREENING FOR HIV: ICD-10-CM

## 2019-11-19 DIAGNOSIS — Z12.5 SCREENING FOR PROSTATE CANCER: ICD-10-CM

## 2019-11-19 PROCEDURE — 99214 OFFICE O/P EST MOD 30 MIN: CPT | Performed by: NURSE PRACTITIONER

## 2019-11-19 RX ORDER — BLOOD-GLUCOSE METER
KIT MISCELLANEOUS
Qty: 1 KIT | Refills: 0 | Status: SHIPPED | OUTPATIENT
Start: 2019-11-19

## 2019-11-19 RX ORDER — SIMVASTATIN 20 MG
20 TABLET ORAL NIGHTLY
Qty: 90 TABLET | Refills: 3 | Status: SHIPPED | OUTPATIENT
Start: 2019-11-19 | End: 2020-12-04

## 2019-11-19 RX ORDER — FEXOFENADINE HCL 180 MG/1
180 TABLET ORAL DAILY
Qty: 90 TABLET | Refills: 1 | Status: SHIPPED | OUTPATIENT
Start: 2019-11-19 | End: 2020-06-03 | Stop reason: SDUPTHER

## 2019-11-19 RX ORDER — IBUPROFEN 600 MG/1
600 TABLET ORAL EVERY 8 HOURS PRN
Qty: 60 TABLET | Refills: 2 | Status: SHIPPED | OUTPATIENT
Start: 2019-11-19 | End: 2020-08-13 | Stop reason: SDUPTHER

## 2019-11-19 ASSESSMENT — ENCOUNTER SYMPTOMS
DIARRHEA: 0
VOMITING: 0
NAUSEA: 0
SHORTNESS OF BREATH: 0
ABDOMINAL PAIN: 0

## 2019-11-20 ENCOUNTER — TELEPHONE (OUTPATIENT)
Dept: FAMILY MEDICINE CLINIC | Age: 54
End: 2019-11-20

## 2019-11-25 DIAGNOSIS — E11.9 CONTROLLED TYPE 2 DIABETES MELLITUS WITHOUT COMPLICATION, WITHOUT LONG-TERM CURRENT USE OF INSULIN (HCC): ICD-10-CM

## 2019-11-25 DIAGNOSIS — Z11.59 NEED FOR HEPATITIS C SCREENING TEST: ICD-10-CM

## 2019-11-25 DIAGNOSIS — Z12.5 SCREENING FOR PROSTATE CANCER: ICD-10-CM

## 2019-11-25 DIAGNOSIS — Z11.4 ENCOUNTER FOR SCREENING FOR HIV: ICD-10-CM

## 2019-11-25 LAB
A/G RATIO: 2 (ref 1.1–2.2)
ALBUMIN SERPL-MCNC: 4.3 G/DL (ref 3.4–5)
ALP BLD-CCNC: 67 U/L (ref 40–129)
ALT SERPL-CCNC: 16 U/L (ref 10–40)
ANION GAP SERPL CALCULATED.3IONS-SCNC: 13 MMOL/L (ref 3–16)
AST SERPL-CCNC: 15 U/L (ref 15–37)
BASOPHILS ABSOLUTE: 0.1 K/UL (ref 0–0.2)
BASOPHILS RELATIVE PERCENT: 1.3 %
BILIRUB SERPL-MCNC: 0.5 MG/DL (ref 0–1)
BUN BLDV-MCNC: 10 MG/DL (ref 7–20)
CALCIUM SERPL-MCNC: 8.9 MG/DL (ref 8.3–10.6)
CHLORIDE BLD-SCNC: 104 MMOL/L (ref 99–110)
CHOLESTEROL, FASTING: 138 MG/DL (ref 0–199)
CO2: 26 MMOL/L (ref 21–32)
CREAT SERPL-MCNC: 0.7 MG/DL (ref 0.9–1.3)
CREATININE URINE: 37.7 MG/DL (ref 39–259)
EOSINOPHILS ABSOLUTE: 0.2 K/UL (ref 0–0.6)
EOSINOPHILS RELATIVE PERCENT: 3.5 %
GFR AFRICAN AMERICAN: >60
GFR NON-AFRICAN AMERICAN: >60
GLOBULIN: 2.2 G/DL
GLUCOSE FASTING: 105 MG/DL (ref 70–99)
HCT VFR BLD CALC: 40.5 % (ref 40.5–52.5)
HDLC SERPL-MCNC: 41 MG/DL (ref 40–60)
HEMOGLOBIN: 13.7 G/DL (ref 13.5–17.5)
HEPATITIS C ANTIBODY INTERPRETATION: NORMAL
LDL CHOLESTEROL CALCULATED: 60 MG/DL
LYMPHOCYTES ABSOLUTE: 2.2 K/UL (ref 1–5.1)
LYMPHOCYTES RELATIVE PERCENT: 40.6 %
MCH RBC QN AUTO: 31.8 PG (ref 26–34)
MCHC RBC AUTO-ENTMCNC: 33.9 G/DL (ref 31–36)
MCV RBC AUTO: 94 FL (ref 80–100)
MICROALBUMIN UR-MCNC: <1.2 MG/DL
MICROALBUMIN/CREAT UR-RTO: ABNORMAL MG/G (ref 0–30)
MONOCYTES ABSOLUTE: 0.4 K/UL (ref 0–1.3)
MONOCYTES RELATIVE PERCENT: 7.5 %
NEUTROPHILS ABSOLUTE: 2.6 K/UL (ref 1.7–7.7)
NEUTROPHILS RELATIVE PERCENT: 47.1 %
PDW BLD-RTO: 13.2 % (ref 12.4–15.4)
PLATELET # BLD: 182 K/UL (ref 135–450)
PMV BLD AUTO: 9.1 FL (ref 5–10.5)
POTASSIUM SERPL-SCNC: 4.1 MMOL/L (ref 3.5–5.1)
RBC # BLD: 4.31 M/UL (ref 4.2–5.9)
SODIUM BLD-SCNC: 143 MMOL/L (ref 136–145)
TOTAL PROTEIN: 6.5 G/DL (ref 6.4–8.2)
TRIGLYCERIDE, FASTING: 187 MG/DL (ref 0–150)
TSH REFLEX FT4: 3.16 UIU/ML (ref 0.27–4.2)
VLDLC SERPL CALC-MCNC: 37 MG/DL
WBC # BLD: 5.5 K/UL (ref 4–11)

## 2019-11-26 LAB
ESTIMATED AVERAGE GLUCOSE: 125.5 MG/DL
HBA1C MFR BLD: 6 %
HIV AG/AB: NORMAL
HIV ANTIGEN: NORMAL
HIV-1 ANTIBODY: NORMAL
HIV-2 AB: NORMAL

## 2019-11-27 LAB
PROSTATE SPECIFIC ANTIGEN FREE: 0.1 UG/L
PROSTATE SPECIFIC ANTIGEN PERCENT FREE: 33.3 %
PROSTATE SPECIFIC ANTIGEN: 0.3 UG/L (ref 0–4)

## 2020-01-27 ENCOUNTER — TELEPHONE (OUTPATIENT)
Dept: FAMILY MEDICINE CLINIC | Age: 55
End: 2020-01-27

## 2020-01-27 NOTE — TELEPHONE ENCOUNTER
Scanned into media/attached to encounter/placed in MA review basket for completion     This is in regards to visit on 11-8-2019

## 2020-02-12 ENCOUNTER — TELEPHONE (OUTPATIENT)
Dept: FAMILY MEDICINE CLINIC | Age: 55
End: 2020-02-12

## 2020-02-13 NOTE — TELEPHONE ENCOUNTER
Submitted PA for Fluticasone Propionate 50MCG/ACT suspension    Via CMM Key: [id number]    STATUS: Approved, will scan once received. Please notify patient thank you.

## 2020-06-03 ENCOUNTER — OFFICE VISIT (OUTPATIENT)
Dept: FAMILY MEDICINE CLINIC | Age: 55
End: 2020-06-03
Payer: COMMERCIAL

## 2020-06-03 VITALS
DIASTOLIC BLOOD PRESSURE: 85 MMHG | BODY MASS INDEX: 33.54 KG/M2 | WEIGHT: 203.4 LBS | HEART RATE: 68 BPM | SYSTOLIC BLOOD PRESSURE: 142 MMHG | OXYGEN SATURATION: 97 % | TEMPERATURE: 97.2 F

## 2020-06-03 LAB
BILIRUBIN, POC: NORMAL
BLOOD URINE, POC: NORMAL
CLARITY, POC: CLEAR
COLOR, POC: YELLOW
GLUCOSE URINE, POC: 100
HBA1C MFR BLD: 7.2 %
KETONES, POC: NORMAL
LEUKOCYTE EST, POC: NORMAL
NITRITE, POC: NORMAL
PH, POC: 7
PROTEIN, POC: NORMAL
SPECIFIC GRAVITY, POC: 1.02
UROBILINOGEN, POC: 0.2

## 2020-06-03 PROCEDURE — 83036 HEMOGLOBIN GLYCOSYLATED A1C: CPT | Performed by: NURSE PRACTITIONER

## 2020-06-03 PROCEDURE — 81002 URINALYSIS NONAUTO W/O SCOPE: CPT | Performed by: NURSE PRACTITIONER

## 2020-06-03 PROCEDURE — 99396 PREV VISIT EST AGE 40-64: CPT | Performed by: NURSE PRACTITIONER

## 2020-06-03 RX ORDER — ALBUTEROL SULFATE 90 UG/1
2 AEROSOL, METERED RESPIRATORY (INHALATION) EVERY 6 HOURS PRN
Qty: 1 INHALER | Refills: 5 | Status: SHIPPED | OUTPATIENT
Start: 2020-06-03 | End: 2021-03-10 | Stop reason: SDUPTHER

## 2020-06-03 RX ORDER — FEXOFENADINE HCL 180 MG/1
180 TABLET ORAL DAILY
Qty: 90 TABLET | Refills: 3 | Status: SHIPPED | OUTPATIENT
Start: 2020-06-03 | End: 2021-06-03

## 2020-06-03 ASSESSMENT — PATIENT HEALTH QUESTIONNAIRE - PHQ9
SUM OF ALL RESPONSES TO PHQ9 QUESTIONS 1 & 2: 0
1. LITTLE INTEREST OR PLEASURE IN DOING THINGS: 0
SUM OF ALL RESPONSES TO PHQ QUESTIONS 1-9: 0
SUM OF ALL RESPONSES TO PHQ QUESTIONS 1-9: 0
2. FEELING DOWN, DEPRESSED OR HOPELESS: 0

## 2020-06-03 NOTE — LETTER
[address]  Phone: [phone]  Fax: [phone]    CHRISTIAN Roca CNP        Roseann 3, 2020     Patient: Maria Eugenia Andrea   YOB: 1965   Date of Visit: 6/3/2020       To Whom it May Concern:    Leslie Alex was seen in my office on 6/3/2020. He can return to work with the following restrictions: cannot work more than 8 hours a day, nor can he work more than 48 hours in 1 week due to current medical conditions. If you have any questions or concerns, please don't hesitate to call. Sincerely,      If you have any questions or concerns, please don't hesitate to call.     Sincerely,         CHRISTIAN Roca CNP

## 2020-06-03 NOTE — PROGRESS NOTES
complication, not stated as uncontrolled        Past Surgical History:   Procedure Laterality Date    CYST INCISION AND DRAINAGE      INGUINAL HERNIA REPAIR Bilateral 14    Laproscopic, with mesh; [place]       Social History     Socioeconomic History    Marital status:      Spouse name: Bobby Sullivan Number of children: 3    Years of education: None    Highest education level: None   Occupational History    None   Social Needs    Financial resource strain: None    Food insecurity     Worry: None     Inability: None    Transportation needs     Medical: None     Non-medical: None   Tobacco Use    Smoking status: Former Smoker     Packs/day: 1.00     Years: 10.00     Pack years: 10.00     Types: Cigarettes     Last attempt to quit: 3/21/1993     Years since quittin.2    Smokeless tobacco: Never Used   Substance and Sexual Activity    Alcohol use:  Yes     Alcohol/week: 1.0 standard drinks     Types: 1 Shots of liquor per week    Drug use: No    Sexual activity: None   Lifestyle    Physical activity     Days per week: None     Minutes per session: None    Stress: None   Relationships    Social connections     Talks on phone: None     Gets together: None     Attends Nondenominational service: None     Active member of club or organization: None     Attends meetings of clubs or organizations: None     Relationship status: None    Intimate partner violence     Fear of current or ex partner: None     Emotionally abused: None     Physically abused: None     Forced sexual activity: None   Other Topics Concern    None   Social History Narrative    None       Family History   Problem Relation Age of Onset    Diabetes Father     Diabetes Brother     Heart Attack Brother 46       Current Outpatient Medications   Medication Sig Dispense Refill    fexofenadine (ALLEGRA) 180 MG tablet Take 1 tablet by mouth daily 90 tablet 3    albuterol sulfate HFA (VENTOLIN HFA) 108 (90 Base) MCG/ACT inhaler Alert and oriented to person, place, and time. No tremor. Exhibits normal muscle tone. Coordination and gait normal.   Skin: Skin is warm and dry. No rash noted. Not diaphoretic. No cyanosis. No pallor. Nails show no clubbing. Psychiatric:  Normal mood and affect. Speech is normal and behavior is normal. Cognition and memory are normal.              UA:   POCT Urinalysis no Micro   Result Value Ref Range    Color, UA yellow     Clarity, UA clear     Glucose, UA      Bilirubin, UA neg     Ketones, UA neg     Spec Grav, UA 1.020     Blood, UA POC moderate     pH, UA 7.0     Protein, UA POC neg     Urobilinogen, UA 0.2     Leukocytes, UA neg     Nitrite, UA neg          ASSESSMENT:   Complete physical.  1. Annual physical exam    2. Controlled type 2 diabetes mellitus without complication, without long-term current use of insulin ([address]    3. Pure hypercholesterolemia    4. Mild intermittent asthma with acute exacerbation    5. RENA (obstructive sleep apnea)    6. Obesity (BMI 30-39. 9)        PLAN:   1. Annual physical exam  All health maintenance issues were updated. Recommend begin progressive daily aerobic exercise program, follow a low fat, low cholesterol diet, attempt to lose weight, continue current medications and return for routine annual checkups  -     POCT Urinalysis no Micro    2. Controlled type 2 diabetes mellitus without complication, without long-term current use of insulin ([facility])  -      DIABETES FOOT EXAM  -     POCT glycosylated hemoglobin (Hb A1C)  Keep upcoming appt for eye exam  Recommend tighter control of diet    3. Pure hypercholesterolemia  FLP at 4 month follow up  Continue Simvastatin    4. Mild intermittent asthma with acute exacerbation  Stable, no changes today  -     fexofenadine (ALLEGRA) 180 MG tablet; Take 1 tablet by mouth daily  -     albuterol sulfate HFA (VENTOLIN HFA) 108 (90 Base) MCG/ACT inhaler; Inhale 2 puffs into the lungs every 6 hours as needed for Wheezing    5.

## 2020-06-03 NOTE — PATIENT INSTRUCTIONS
during a routine doctor visit. Your doctor will tell you how often to check your blood pressure based on your age, your blood pressure results, and other factors. · Prostate exam. Talk to your doctor about whether you should have a blood test (called a PSA test) for prostate cancer. Experts recommend that you discuss the benefits and risks of the test with your doctor before you decide whether to have this test.  · Diabetes. Ask your doctor whether you should have tests for diabetes. · Vision. Some experts recommend that you have yearly exams for glaucoma and other age-related eye problems starting at age 48. · Hearing. Tell your doctor if you notice any change in your hearing. You can have tests to find out how well you hear. · Colorectal cancer. Your risk for colorectal cancer gets higher as you get older. Some experts say that adults should start regular screening at age 48 and stop at age 76. Others say to start before age 48 or continue after age 76. Talk with your doctor about your risk and when to start and stop screening. · Heart attack and stroke risk. At least every 4 to 6 years, you should have your risk for heart attack and stroke assessed. Your doctor uses factors such as your age, blood pressure, cholesterol, and whether you smoke or have diabetes to show what your risk for a heart attack or stroke is over the next 10 years. · Abdominal aortic aneurysm. Ask your doctor whether you should have a test to check for an aneurysm. You may need a test if you ever smoked or if your parent, brother, sister, or child has had an aneurysm. When should you call for help? Watch closely for changes in your health, and be sure to contact your doctor if you have any problems or symptoms that concern you. Where can you learn more? Go to https://joe.Trax Technologies. org and sign in to your Vitet account.  Enter R681 in the [facility] box to learn more about \"Well Visit, Men 48 to 72: Care by diet or medicine that doesn't lower your blood sugar, you don't need to eat a snack before you exercise. · Check your blood sugar before you exercise. And be careful about what you eat. ? If your blood sugar is less than 100, eat a carbohydrate snack before you exercise. ? Be careful when you exercise if your blood sugar is over 300. High blood sugar can make you dehydrated. And that makes your blood sugar levels go even higher. If you have ketones in your blood or urine and your blood sugar is over 300, do not exercise. · Don't try to do too much at first. Build up your exercise program bit by bit. Try to get at least 30 minutes of exercise on most days of the week. Walking is a good choice. You also may want to do other activities, such as riding a bike or swimming. You might try running or gardening. Try to include muscle-strengthening exercises at least 2 times a week. These exercises include push-ups and weight training. You can also use rubber tubing or stretch bands. You stretch or pull the tubing or band to build muscle strength. If you want to exercise more, slowly increase how hard or long you exercise. · You may get symptoms of low blood sugar during exercise or up to 24 hours later. Some symptoms of low blood sugar, such as sweating, a fast heartbeat, or feeling tired, can be confused with what can happen anytime you exercise. Other symptoms may include feeling anxious, dizzy, weak, or shaky. So it's a good idea to check your blood sugar again. · You can treat low blood sugar by eating or drinking something that has 15 grams of carbohydrate. These should be quick-sugar foods. Quick-sugar foods such as glucose tablets, table sugar, honey, fruit juice, regular (not diet) soda pop, or hard candy can help raise blood sugar. Check your blood sugar level again 15 minutes after having a quick-sugar food to make sure your level is getting back to your target range.   · Drink plenty of water before,

## 2020-06-04 ENCOUNTER — TELEPHONE (OUTPATIENT)
Dept: FAMILY MEDICINE CLINIC | Age: 55
End: 2020-06-04

## 2020-06-05 ENCOUNTER — TELEPHONE (OUTPATIENT)
Dept: FAMILY MEDICINE CLINIC | Age: 55
End: 2020-06-05

## 2020-06-06 ENCOUNTER — TELEPHONE (OUTPATIENT)
Dept: FAMILY MEDICINE CLINIC | Age: 55
End: 2020-06-06

## 2020-06-08 ENCOUNTER — OFFICE VISIT (OUTPATIENT)
Dept: FAMILY MEDICINE CLINIC | Age: 55
End: 2020-06-08
Payer: COMMERCIAL

## 2020-06-08 VITALS
BODY MASS INDEX: 33.66 KG/M2 | TEMPERATURE: 98.2 F | DIASTOLIC BLOOD PRESSURE: 80 MMHG | WEIGHT: 202 LBS | SYSTOLIC BLOOD PRESSURE: 155 MMHG | OXYGEN SATURATION: 96 % | HEART RATE: 84 BPM | HEIGHT: 65 IN

## 2020-06-08 LAB
BACTERIA URINE, POC: ABNORMAL
BILIRUBIN URINE: 0 MG/DL
BLOOD, URINE: POSITIVE
CASTS URINE, POC: ABNORMAL
CLARITY: CLEAR
COLOR: YELLOW
CRYSTALS URINE, POC: ABNORMAL
EPI CELLS URINE, POC: ABNORMAL
GLUCOSE URINE: 500
KETONES, URINE: NEGATIVE
LEUKOCYTE EST, POC: ABNORMAL
NITRITE, URINE: NEGATIVE
PH UA: 7 (ref 4.5–8)
PROTEIN UA: POSITIVE
RBC URINE, POC: ABNORMAL
SPECIFIC GRAVITY UA: 1.02 (ref 1–1.03)
UROBILINOGEN, URINE: NORMAL
WBC URINE, POC: ABNORMAL
YEAST URINE, POC: ABNORMAL

## 2020-06-08 PROCEDURE — 99214 OFFICE O/P EST MOD 30 MIN: CPT | Performed by: FAMILY MEDICINE

## 2020-06-08 PROCEDURE — 81000 URINALYSIS NONAUTO W/SCOPE: CPT | Performed by: FAMILY MEDICINE

## 2020-06-08 RX ORDER — LISINOPRIL 20 MG/1
20 TABLET ORAL DAILY
Qty: 90 TABLET | Refills: 1 | Status: SHIPPED | OUTPATIENT
Start: 2020-06-08 | End: 2020-12-07 | Stop reason: SDUPTHER

## 2020-06-08 NOTE — LETTER
[address]  Phone: [phone]  Fax: [phone]    Jose Garrison MD        June 8, 2020    Patient: Etelvina Kinney   YOB: 1965   Date of Visit: 6/3/2020       To Whom it May Concern:    Kennith Spurling was seen in my office on 6/8/2020. He can return to work with the following restrictions: cannot work more than 8 hours a day, nor can he work more than 48 hours in 1 week due to current medical conditions. This restriction is valid for one year. If you have any questions or concerns, please don't hesitate to call.           Sincerely,        Jose Garrison MD

## 2020-06-11 ENCOUNTER — TELEPHONE (OUTPATIENT)
Dept: FAMILY MEDICINE CLINIC | Age: 55
End: 2020-06-11

## 2020-06-11 NOTE — TELEPHONE ENCOUNTER
Dr Rajan Vasquez filled out paper work for pt recently. ([employer] Return to Work form)    His HR Dept said they need more specifics on his problem. Pt said he could drop off paperwork tomorrow or make an appt. Call pt and advise.

## 2020-06-12 ENCOUNTER — TELEPHONE (OUTPATIENT)
Dept: FAMILY MEDICINE CLINIC | Age: 55
End: 2020-06-12

## 2020-06-18 ENCOUNTER — TELEPHONE (OUTPATIENT)
Dept: PULMONOLOGY | Age: 55
End: 2020-06-18

## 2020-06-29 ENCOUNTER — VIRTUAL VISIT (OUTPATIENT)
Dept: PULMONOLOGY | Age: 55
End: 2020-06-29
Payer: COMMERCIAL

## 2020-06-29 PROCEDURE — 3051F HG A1C>EQUAL 7.0%<8.0%: CPT | Performed by: NURSE PRACTITIONER

## 2020-06-29 PROCEDURE — 99214 OFFICE O/P EST MOD 30 MIN: CPT | Performed by: NURSE PRACTITIONER

## 2020-06-29 ASSESSMENT — SLEEP AND FATIGUE QUESTIONNAIRES
ESS TOTAL SCORE: 3
HOW LIKELY ARE YOU TO NOD OFF OR FALL ASLEEP WHEN YOU ARE A PASSENGER IN A CAR FOR AN HOUR WITHOUT A BREAK: 1
HOW LIKELY ARE YOU TO NOD OFF OR FALL ASLEEP WHILE SITTING QUIETLY AFTER LUNCH WITHOUT ALCOHOL: 0
HOW LIKELY ARE YOU TO NOD OFF OR FALL ASLEEP IN A CAR, WHILE STOPPED FOR A FEW MINUTES IN TRAFFIC: 0
HOW LIKELY ARE YOU TO NOD OFF OR FALL ASLEEP WHILE SITTING INACTIVE IN A PUBLIC PLACE: 0
HOW LIKELY ARE YOU TO NOD OFF OR FALL ASLEEP WHILE LYING DOWN TO REST IN THE AFTERNOON WHEN CIRCUMSTANCES PERMIT: 0
HOW LIKELY ARE YOU TO NOD OFF OR FALL ASLEEP WHILE SITTING AND TALKING TO SOMEONE: 0
HOW LIKELY ARE YOU TO NOD OFF OR FALL ASLEEP WHILE SITTING AND READING: 1
HOW LIKELY ARE YOU TO NOD OFF OR FALL ASLEEP WHILE WATCHING TV: 1

## 2020-06-29 NOTE — PROGRESS NOTES
Silver Martinez MD, FAASM, [facility]  Efrain Brennan, MSN, RN, CNP     [address] [place] [address] SLEEP MEDICINE  [address] [address]  Dept: [phone]  Dept Fax: : [phone] [facility] MEDICINE  [address] [place] [id number] [phone]    Subjective:     Patient ID: Renee Jeff is a 47 y.o. male. Chief Complaint   Patient presents with    Sleep Apnea       HPI:      Sleep Medicine Video Visit    Pursuant to the emergency declaration under the [address] [place] [address], [facility] waiver authority and the [employer] Act this Telephone Visit was insisted, with patient's consent, to reduce the patient's risk of exposure to COVID-19 and provide continuity of care for an established patient. Services were provided through a synchronous discussion over a telephone and/or Video chat to substitute for in-person clinic visit, and coded as such. While patient is at home. Machine Modem/Download Info:  Compliance (hours/night): 3.9 hrs/night  Download AHI (/hour): 2 /HR     Average IPAP Pressure: 13.1 cmH2O  Average EPAP Pressure: 10.6 cmH2O         AUTO BIPAP - Settings (Meghan)  IPAP Max: 25 cmH2O  EPAP Min: 8 cmH2O  Pressure Support Min: 2  Pressure Support Max: 4             Comfort Settings  Humidity Level (0-8): 1  Flex/EPR (0-3): 3 PAP Mask  Mask Type: Full Face mask     [place] - Total score: 3    Follow-up :     Last Visit : March 2019      Patient reports the listed chronic Co-morbidities: DM, Shift work disorder, Asthma,    are well controlled and stable at this time.      Subjective Health Changes: None      Over Night Oximetry: [] Yes  [] No  [x] NA [] WNL   Using O2: [] Yes  [] No  [x] NA   Patient is compliant with the machine  [] Yes  [x] No   Feeling rested when using the machine   [x] placed in this encounter. No orders of the defined types were placed in this encounter.       Sudhir Haynes, MSN, RN, CNP

## 2020-06-29 NOTE — ASSESSMENT & PLAN NOTE
Reviewed compliance download with pt. Supplies and parts as needed for his machine. These are medically necessary. Continue medications per his PCP and other physicians. Limit caffeine use after 3pm.  Encouraged him to work on weight loss through diet and exercise. Diagnoses of Controlled type 2 diabetes mellitus without complication, without long-term current use of insulin ([facility] [address] Shift work sleep disorder, and Mild intermittent asthma without complication were pertinent to this visit. The chronic medical conditions listed are directly related to the primary diagnosis listed above. The management of the primary diagnosis affects the secondary diagnosis and vice versa.

## 2020-07-28 NOTE — TELEPHONE ENCOUNTER
Medication:   Requested Prescriptions     Pending Prescriptions Disp Refills    metFORMIN (GLUCOPHAGE) 500 MG tablet [Pharmacy Med Name: metFORMIN HCl Oral Tablet 500 MG] 180 tablet 0     Sig: TAKE 1 TABLET BY MOUTH TWO TIMES A DAY WITH MEALS       Last Filled:  05/28/2019 #[id number]     Patient Phone Number: [phone] (home)     Last appt: 06/08/2020   Next appt: Visit date not found    Last Labs DM:   Lab Results   Component Value Date    LABA1C 7.2 06/03/2020

## 2020-08-06 ENCOUNTER — NURSE TRIAGE (OUTPATIENT)
Dept: OTHER | Facility: CLINIC | Age: 55
End: 2020-08-06

## 2020-08-06 ENCOUNTER — TELEPHONE (OUTPATIENT)
Dept: FAMILY MEDICINE CLINIC | Age: 55
End: 2020-08-06

## 2020-08-06 NOTE — TELEPHONE ENCOUNTER
----- Message from Santos Rodriguez sent at 8/6/2020  8:41 AM EDT -----  Subject: Appointment Request    Reason for Call: Semi-Routine Return from RN Triage    QUESTIONS  Type of Appointment? Established Patient  Reason for appointment request? No appointments available during search  Additional Information for Provider? Patient would like to be seen in   office instead VV   nurse would like him seen within 3 days for knee pain. Please advise  ---------------------------------------------------------------------------  --------------  CALL BACK INFO  What is the best way for the office to contact you? OK to leave message on   voicemail  Preferred Call Back Phone Number? [phone]  ---------------------------------------------------------------------------  --------------  SCRIPT ANSWERS  Patient needs to be seen within 5 days? Yes  Nurse Name? Dinah  (Did RN indicate the need for Red Scheduling?)?  Yes

## 2020-08-06 NOTE — TELEPHONE ENCOUNTER
Pain and swelling in right knee. Denies any injury. It start 4 days ago, it is like pinching on inside of knee. He feels the pinching when he is going up and down the stairs. Reason for Disposition   MODERATE pain (e.g., symptoms interfere with work or school, limping) and present > 3 days    Answer Assessment - Initial Assessment Questions  1. LOCATION and RADIATION: \"Where is the pain located? \"      Pain and swelling in right knee. Denies any injury. It start 4 days ago, it is like pinching on inside of knee. He feels the pinching when he is going up and down the stairs. 2. QALITY: \"What does the pain feel like? \"  (e.g., sharp, dull, aching, burning)      Pinching    3. SEVERITY: \"How bad is the pain? \" \"What does it keep you from doing? \"   (Scale 1-10; or mild, moderate, severe)    -  MILD (1-3): doesn't interfere with normal activities     -  MODERATE (4-7): interferes with normal activities (e.g., work or school) or awakens from sleep, limping     -  SEVERE (8-10): excruciating pain, unable to do any normal activities, unable to walk      6/10 when he has the pinch. When going up and down stairs 8/10    4. ONSET: \"When did the pain start? \" \"Does it come and go, or is it there all the time? \"      4 days ago    5. RECURRENT: \"Have you had this pain before? \" If so, ask: \"When, and what happened then? \"      Not had before    6. SETTING: \"Has there been any recent work, exercise or other activity that involved that part of the body? \"       Maybe when he took a long walk. That is when he noticed his knee start bothering him. 7. AGGRAVATING FACTORS: \"What makes the knee pain worse? \" (e.g., walking, climbing stairs, running)      Going down the stairs hurts the most.    8. ASSOCIATED SYMPTOMS: \"Is there any swelling or redness of the knee? \"      Swelling on knee, denies any redness. 9. OTHER SYMPTOMS: \"Do you have any other symptoms? \" (e.g., chest pain, difficulty breathing, fever, calf pain)

## 2020-08-13 ENCOUNTER — OFFICE VISIT (OUTPATIENT)
Dept: FAMILY MEDICINE CLINIC | Age: 55
End: 2020-08-13
Payer: COMMERCIAL

## 2020-08-13 VITALS
OXYGEN SATURATION: 98 % | WEIGHT: 194.8 LBS | SYSTOLIC BLOOD PRESSURE: 126 MMHG | DIASTOLIC BLOOD PRESSURE: 86 MMHG | HEART RATE: 65 BPM | BODY MASS INDEX: 32.14 KG/M2

## 2020-08-13 PROCEDURE — 99214 OFFICE O/P EST MOD 30 MIN: CPT | Performed by: NURSE PRACTITIONER

## 2020-08-13 RX ORDER — IBUPROFEN 600 MG/1
600 TABLET ORAL EVERY 8 HOURS PRN
Qty: 90 TABLET | Refills: 3 | Status: SHIPPED | OUTPATIENT
Start: 2020-08-13

## 2020-08-13 ASSESSMENT — ENCOUNTER SYMPTOMS
BACK PAIN: 0
ABDOMINAL PAIN: 1
VOMITING: 0
CONSTIPATION: 0
DIARRHEA: 0
NAUSEA: 0
SHORTNESS OF BREATH: 0

## 2020-08-13 NOTE — PATIENT INSTRUCTIONS
Over the counter Pepcid/Famitodine 20 mg if stomach pain returns      Patient Education        Knee Pain or Injury: Care Instructions  Your Care Instructions     Injuries are a common cause of knee problems. Sudden (acute) injuries may be caused by a direct blow to the knee. They can also be caused by abnormal twisting, bending, or falling on the knee. Pain, bruising, or swelling may be severe, and may start within minutes of the injury. Overuse is another cause of knee pain. Other causes are climbing stairs, kneeling, and other activities that use the knee. Everyday wear and tear, especially as you get older, also can cause knee pain. Rest, along with home treatment, often relieves pain and allows your knee to heal. If you have a serious knee injury, you may need tests and treatment. Follow-up care is a key part of your treatment and safety. Be sure to make and go to all appointments, and call your doctor if you are having problems. It's also a good idea to know your test results and keep a list of the medicines you take. How can you care for yourself at home? · Be safe with medicines. Read and follow all instructions on the label. ? If the doctor gave you a prescription medicine for pain, take it as prescribed. ? If you are not taking a prescription pain medicine, ask your doctor if you can take an over-the-counter medicine. · Rest and protect your knee. Take a break from any activity that may cause pain. · Put ice or a cold pack on your knee for 10 to 20 minutes at a time. Put a thin cloth between the ice and your skin. · Prop up a sore knee on a pillow when you ice it or anytime you sit or lie down for the next 3 days. Try to keep it above the level of your heart. This will help reduce swelling. · If your knee is not swollen, you can put moist heat, a heating pad, or a warm cloth on your knee.   · If your doctor recommends an elastic bandage, sleeve, or other type of support for your knee, wear it as \"Sign Up Now\" link. Current as of: June 26, 2019               Content Version: 12.5  © 2006-2020 Healthwise, [employer]. Care instructions adapted under license by [facility] ([facility]). If you have questions about a medical condition or this instruction, always ask your healthcare professional. [address] any warranty or liability for your use of this information. Patient Education        Indigestion (Dyspepsia or Heartburn): Care Instructions  Your Care Instructions  Sometimes it can be hard to pinpoint the cause of indigestion. (It is also called dyspepsia or heartburn.) Most cases of an upset stomach with bloating, burning, burping, and nausea are minor and go away within several hours. Home treatment and over-the-counter medicine often are able to control symptoms. But if you take medicine to relieve your indigestion without making diet and lifestyle changes, your symptoms are likely to return again and again. If you get indigestion often, it may be a sign of a more serious medical problem. Be sure to follow up with your doctor, who may want to do tests to be sure of the cause of your indigestion. Follow-up care is a key part of your treatment and safety. Be sure to make and go to all appointments, and call your doctor if you are having problems. It's also a good idea to know your test results and keep a list of the medicines you take. How can you care for yourself at home? · Your doctor may recommend over-the-counter medicine. For mild or occasional indigestion, antacids such as Gaviscon, Mylanta, Maalox, or Tums, may help. Be safe with medicines. Be careful when you take over-the-counter antacid medicines. Many of these medicines have aspirin in them. Read the label to make sure that you are not taking more than the recommended dose. Too much aspirin can be harmful.   · Your doctor also may recommend over-the-counter acid reducers, such as Pepcid AC (famotidine), Tagamet HB (cimetidine), or Prilosec (omeprazole). Read and follow all instructions on the label. If you use these medicines often, talk with your doctor. · Change your eating habits. ? It's best to eat several small meals instead of two or three large meals. ? After you eat, wait 2 to 3 hours before you lie down. ? Chocolate, mint, and alcohol can make GERD worse. ? Spicy foods, foods that have a lot of acid (like tomatoes and oranges), and coffee can make GERD symptoms worse in some people. If your symptoms are worse after you eat a certain food, you may want to stop eating that food to see if your symptoms get better. · Do not smoke or chew tobacco. Smoking can make GERD worse. If you need help quitting, talk to your doctor about stop-smoking programs and medicines. These can increase your chances of quitting for good. · If you have GERD symptoms at night, raise the head of your bed 6 to 8 inches. You can do this by putting the frame on blocks or placing a foam wedge under the head of your mattress. (Adding extra pillows does not work.)  · Do not wear tight clothing around your middle. · Lose weight if you need to. Losing just 5 to 10 pounds can help. · Do not take anti-inflammatory medicines, such as aspirin, ibuprofen (Advil, Motrin), or naproxen (Aleve). These can irritate the stomach. If you need a pain medicine, try acetaminophen (Tylenol), which does not cause stomach upset. When should you call for help? Call your doctor now or seek immediate medical care if:  · You have new or worse belly pain. · You are vomiting. Watch closely for changes in your health, and be sure to contact your doctor if:  · You have new or worse symptoms of indigestion. · You have trouble or pain swallowing. · You are losing weight. · You do not get better as expected. Where can you learn more? Go to https://joe.[employer]. org and sign in to your [employer] account.  Enter W571 in the [employer] box to learn more about \"Indigestion (Dyspepsia or Heartburn): Care Instructions. \"     If you do not have an account, please click on the \"Sign Up Now\" link. Current as of: August 12, 2019               Content Version: 12.5  © 5954-3568 Healthwise, Inc[employer]. Care instructions adapted under license by [facility] ([facility]). If you have questions about a medical condition or this instruction, always ask your healthcare professional. [address] any warranty or liability for your use of this information.

## 2020-08-13 NOTE — PROGRESS NOTES
SUBJECTIVE:  Pt is a of 47 y.o. male comes in today with   Chief Complaint   Patient presents with    Knee Pain     Pt states he is having right knee pain. He feel the pain more when going down the steps. x 3 weeks ibuprofen 600mg once a day for the pain        Patient presenting today for evaluation of right knee pain. Medial pain x 3 weeks ago. Associated with stiffness. States \"feels like a needle in my knee\". Walking down steps worsens pain. Squatting increases pain. Improves with Ibu 600 mg. At times knee swells. No skin discoloration. Also c/o epigastric pain. First episode 1 week ago, then again 3 days ago. Started after eating hot pepper. Associated with bloating, belching and increased bowel mov'ts. Denies SOB, palpitations, arm pain, neck pain, nausea, lightheadedness or HA. Prior to Visit Medications    Medication Sig Taking?  Authorizing Provider   metFORMIN (GLUCOPHAGE) 500 MG tablet TAKE 1 TABLET BY MOUTH TWO TIMES A DAY WITH MEALS Yes Kaci Nugent MD   lisinopril (PRINIVIL;ZESTRIL) 20 MG tablet Take 1 tablet by mouth daily Yes Kaci Nugent MD   fexofenadine (ALLEGRA) 180 MG tablet Take 1 tablet by mouth daily Yes CHRISTIAN Pugh CNP   albuterol sulfate HFA (VENTOLIN HFA) 108 (90 Base) MCG/ACT inhaler Inhale 2 puffs into the lungs every 6 hours as needed for Wheezing Yes CHRISTIAN Pugh CNP   simvastatin (ZOCOR) 20 MG tablet Take 1 tablet by mouth nightly Yes CHRISTIAN Pugh CNP   ibuprofen (IBU) 600 MG tablet Take 1 tablet by mouth every 8 hours as needed for Pain Yes CHRISTIAN Pugh CNP   glucose monitoring kit (FREESTYLE) monitoring kit Use to check BS daily Yes CHRISTIAN Pugh CNP   fluticasone (FLONASE) 50 MCG/ACT nasal spray 2 sprays by Each Nostril route daily Yes Kaci Nugent MD   lisinopril (PRINIVIL;ZESTRIL) 10 MG tablet TAKE 1 TABLET BY MOUTH ONE TIME A DAY  Yes Kaci Nugent MD   hydrocortisone (ANUSOL-HC) 2.5 % rectal cream Place rectally 2 times daily. Yes CHRISTIAN Montero CNP   blood glucose test strips (FREESTYLE LITE) strip Use to test blood Sugar once daily as instructed. Yes Sylvie Kaur MD   fluticasone (FLONASE) 50 MCG/ACT nasal spray 2 sprays by Nasal route daily Yes CHRISTIAN Montero CNP   FREESTYLE LANCETS MISC USE ONCE DAILY. Yes Sylvie Kaur MD   beclomethasone (QVAR) 40 MCG/ACT inhaler Inhale 2 puffs into the lungs 2 times daily Yes Jasmina Salinas MD   Chlorphen-Phenyleph-APAP 2-5-325 MG TABS Take 1 tablet by mouth 4 times daily as needed (congestion and cough) Yes CHRISTIAN Montero CNP   Blood Pressure KIT Please dispense one BP kit Yes CHRISTIAN Montero CNP   UNABLE TO FIND Per patient He is not on any OTC medication at this time. Yes Historical Provider, MD         Patient's allergies, past medical, surgical, social and family histories werereviewed and updated as appropriate. Review of Systems   Constitutional: Negative for chills and fever. Respiratory: Negative for shortness of breath. Cardiovascular: Negative for palpitations and leg swelling. Gastrointestinal: Positive for abdominal pain (epigastric). Negative for constipation, diarrhea, nausea and vomiting. Genitourinary: Negative for dysuria, flank pain, frequency, hematuria and urgency. Musculoskeletal: Positive for arthralgias (right knee pain). Negative for back pain. Physical Exam  Vitals signs reviewed. Constitutional:       Appearance: Normal appearance. He is well-developed. Cardiovascular:      Rate and Rhythm: Normal rate and regular rhythm. Heart sounds: Normal heart sounds. No murmur. No friction rub. No gallop. Pulmonary:      Effort: Pulmonary effort is normal.      Breath sounds: Normal breath sounds. Abdominal:      General: Bowel sounds are normal.      Palpations: Abdomen is soft. Abdomen is not rigid. Tenderness: There is no abdominal tenderness.  There is no guarding or rebound. Musculoskeletal:      Right knee: He exhibits normal range of motion, no swelling and no erythema. Tenderness found. Medial joint line tenderness noted. Skin:     General: Skin is warm and dry. Psychiatric:         Behavior: Behavior normal.         Thought Content: Thought content normal.         Judgment: Judgment normal.       Vitals:    08/13/20 1329   BP: 126/86   Pulse: 65   SpO2: 98%   Weight: 194 lb 12.8 oz (88.4 kg)             ASSESSMENT:  1. Acute pain of right knee    2. Dyspepsia        PLAN:  1. Acute pain of right knee  New problem  -     [facility]  Ibu 600 mg TID PRN, rest, ice and heat    2. Dyspepsia  New problem  Trial Pepcid when symptoms present    See pt instructions  F/u 1-2 weeks if no improvement, sooner if symptomsworsen. Discussed use, benefit, and side effects of prescribed medications. All patient questions answered. Pt voiced understanding.

## 2020-09-09 ENCOUNTER — OFFICE VISIT (OUTPATIENT)
Dept: FAMILY MEDICINE CLINIC | Age: 55
End: 2020-09-09
Payer: COMMERCIAL

## 2020-09-09 VITALS
TEMPERATURE: 98.4 F | HEART RATE: 69 BPM | SYSTOLIC BLOOD PRESSURE: 134 MMHG | WEIGHT: 197 LBS | BODY MASS INDEX: 32.82 KG/M2 | HEIGHT: 65 IN | DIASTOLIC BLOOD PRESSURE: 88 MMHG | OXYGEN SATURATION: 97 %

## 2020-09-09 LAB — HBA1C MFR BLD: 6.4 %

## 2020-09-09 PROCEDURE — 83036 HEMOGLOBIN GLYCOSYLATED A1C: CPT | Performed by: FAMILY MEDICINE

## 2020-09-09 PROCEDURE — 99213 OFFICE O/P EST LOW 20 MIN: CPT | Performed by: FAMILY MEDICINE

## 2020-09-09 NOTE — PROGRESS NOTES
ONCE DAILY. Yes Sylvie Kaur MD   beclomethasone (QVAR) 40 MCG/ACT inhaler Inhale 2 puffs into the lungs 2 times daily Yes Jasmina Salinas MD   Chlorphen-Phenyleph-APAP 2-5-325 MG TABS Take 1 tablet by mouth 4 times daily as needed (congestion and cough) Yes CHRISTIAN Montero CNP   Blood Pressure KIT Please dispense one BP kit Yes CHRISTIAN Montero CNP   UNABLE TO FIND Per patient He is not on any OTC medication at this time. Yes Historical Provider, MD       OBJECTIVE:  /88   Pulse 69   Temp 98.4 °F (36.9 °C)   Ht 5' 5.28\" (1.658 m)   Wt 197 lb (89.4 kg)   SpO2 97%   BMI 32.51 kg/m²   GEN:  in NAD  NECK:  Supple without adenopathy. no bruit  CV:  Regular rate and rhythm, S1 and S2 normal, no murmurs, clicks  PULM:  Chest is clear, no wheezing ,  symmetric air entry throughout both lung fields. EXT: No rash or edema, right knee  In brace  NEURO: Alert and oriented ×3  a1c - 6.4    ASSESSMENT/PLAN:  Encounter Diagnoses   Name Primary?     Pyogenic granuloma resolved    Obesity (BMI 30-39.9) - improving Getting better    Pure hypercholesterolemia     Elevated blood pressure reading stable    Mild intermittent asthma without complication stable   Right knee pain  Dm - improved    F/u with ortho  May be going on short term disability  Get eye exam  Flu shot in 1 mo  Offered shingrix - will consider

## 2020-09-15 ENCOUNTER — TELEPHONE (OUTPATIENT)
Dept: FAMILY MEDICINE CLINIC | Age: 55
End: 2020-09-15

## 2020-09-15 NOTE — TELEPHONE ENCOUNTER
Pt's wife dropped of paperwork to have filled out by Dr Jaycee Garcia and faxed to [address]     Pt's wife will call back and give fax number

## 2020-09-30 ENCOUNTER — VIRTUAL VISIT (OUTPATIENT)
Dept: PULMONOLOGY | Age: 55
End: 2020-09-30
Payer: COMMERCIAL

## 2020-09-30 PROCEDURE — 99442 PR PHYS/QHP TELEPHONE EVALUATION 11-20 MIN: CPT | Performed by: NURSE PRACTITIONER

## 2020-09-30 ASSESSMENT — SLEEP AND FATIGUE QUESTIONNAIRES
HOW LIKELY ARE YOU TO NOD OFF OR FALL ASLEEP WHILE WATCHING TV: 3
ESS TOTAL SCORE: 4
HOW LIKELY ARE YOU TO NOD OFF OR FALL ASLEEP WHILE SITTING INACTIVE IN A PUBLIC PLACE: 0
HOW LIKELY ARE YOU TO NOD OFF OR FALL ASLEEP IN A CAR, WHILE STOPPED FOR A FEW MINUTES IN TRAFFIC: 0
HOW LIKELY ARE YOU TO NOD OFF OR FALL ASLEEP WHILE SITTING QUIETLY AFTER LUNCH WITHOUT ALCOHOL: 0
HOW LIKELY ARE YOU TO NOD OFF OR FALL ASLEEP WHEN YOU ARE A PASSENGER IN A CAR FOR AN HOUR WITHOUT A BREAK: 0
HOW LIKELY ARE YOU TO NOD OFF OR FALL ASLEEP WHILE LYING DOWN TO REST IN THE AFTERNOON WHEN CIRCUMSTANCES PERMIT: 0
HOW LIKELY ARE YOU TO NOD OFF OR FALL ASLEEP WHILE SITTING AND TALKING TO SOMEONE: 0
HOW LIKELY ARE YOU TO NOD OFF OR FALL ASLEEP WHILE SITTING AND READING: 1

## 2020-09-30 NOTE — PROGRESS NOTES
Manda Villalba MD, FAASM, [facility]  Corbin Spivey, MSN, RN, CNP     [address] [facility]  [place] [address] [address] [facility] [address]  Dept: [phone]  Dept Fax: [phone]: [facility]  [address] [place] [id number] [phone]    Subjective:     Patient ID: Bee Bolden is a 47 y.o. male. Chief Complaint   Patient presents with    Sleep Apnea       HPI:      Sleep Medicine Telephone Visit    Pursuant to the emergency declaration under the [facility], [facility] waiver authority and the [employer] Act this Telephone Visit was insisted, with patient's consent, to reduce the patient's risk of exposure to COVID-19 and provide continuity of care for an established patient. Services were provided through a synchronous discussion over a telephone and/or Video chat to substitute for in-person clinic visit, and coded as such. While patient is at home. Machine Modem/Download Info:  Compliance (hours/night): 4.2 hrs/night  Download AHI (/hour): 1.9 /HR     Average IPAP Pressure: 12.8 cmH2O  Average EPAP Pressure: 10.3 cmH2O         AUTO BIPAP - Settings (Meghan)  IPAP Max: 25 cmH2O  EPAP Min: 9 cmH2O  Pressure Support Min: 2  Pressure Support Max: 4             Comfort Settings  Humidity Level (0-8): 1  Flex/EPR (0-3): 3 PAP Mask  Mask Type: Full Face mask     [place] - Total score: 4    Follow-up :     Last Visit : June 2020      Patient reports the listed chronic Co-morbidities: DM, Allergies, Asthma, Obesity, Shift work disorder   are well controlled and stable at this time.      Subjective Health Changes: None      Over Night Oximetry: [] Yes  [] No  [x] NA [] WNL   Using O2: [] Yes  [] No  [x] NA   Patient is compliant with the machine  [] Yes  [x] No   Feeling rested when using the machine   [x] Yes  [] No     Pressure is comfortable with inspiration and expiration  [x] Yes  [] No     Noticed changes in pressure   [x] Yes  [] No  [] NA   Mask is fitting well  [] Yes  [x] No   Noting Mask Air Leak  [] Yes  [x] No   Having painful Aerophagia  [] Yes  [x] No   Nocturia   0  per night. Having  HA upon waking  [] Yes  [x] No   Dry mouth upon waking   Dry Nose  Dry Eyes  [x] Yes  [] No   Congestion upon waking   [x] Yes  [] No    Nose Bleeds  [] Yes  [x] No   Using Sleep Aides    [x] NA   Understands how to change humidification and/or tubing temperature for comfort while at home  [] Yes  [x] No     Difficulties falling asleep  [] Yes  [x] No   Difficulties staying asleep  [] Yes  [x] No   Approximate time to bed  11:30pm-12:30am   Approximate wake time  6-7:30am   Taking Naps  no   If taking naps usual length    [x] NA   If taking naps using the machine  [] Yes  [] No  [x] NA [] With and With out    Drowsy when driving  [] Yes  [x] No     Does patient carry a DOT/CDL  [] Yes  [x] No     Does patient carry FAA/[employer] License   [] Yes  [x] No      Any concerns noted with the machine at this time  [] Yes  [x] No        Diagnosis Orders   1. RENA (obstructive sleep apnea)     2. Shift work sleep disorder     3. Obesity (BMI 30-39.9)     4. Controlled type 2 diabetes mellitus without complication, without long-term current use of insulin ([facility] [address]     5. Allergic rhinitis, unspecified seasonality, unspecified trigger     6. Mild intermittent asthma without complication         The chronic medical conditions listed are directly related to the primary diagnosis listed above. The management of the primary diagnosis affects the secondary diagnosis and vice versa. Assessment/Plan:     Shift work sleep disorder  Chronic- Stable. Cont meds per PCP and other physicians. Obesity (BMI 30-39. 9)  Chronic-Stable. Encouraged him to work on weight loss through diet and exercise.       Diabetes type 2, controlled ([address]  Chronic- Stable. Cont meds per PCP and other physicians. AR (allergic rhinitis)  Chronic- Stable. Cont meds per PCP and other physicians. Mild intermittent asthma without complication  Chronic- Stable. Cont meds per PCP and other physicians. RENA (obstructive sleep apnea)  Reviewed compliance download with pt. Supplies and parts as needed for his machine. These are medically necessary. Continue medications per his PCP and other physicians. Limit caffeine use after 3pm.  Encouraged him to work on weight loss through diet and exercise. Diagnoses of Shift work sleep disorder, Obesity (BMI 30-39.9), Controlled type 2 diabetes mellitus without complication, without long-term current use of insulin ([address] Allergic rhinitis, unspecified seasonality, unspecified trigger, and Mild intermittent asthma without complication were pertinent to this visit. The chronic medical conditions listed are directly related to the primary diagnosis listed above. The management of the primary diagnosis affects the secondary diagnosis and vice versa. The primary encounter diagnosis was RENA (obstructive sleep apnea). Diagnoses of Shift work sleep disorder, Obesity (BMI 30-39.9), Controlled type 2 diabetes mellitus without complication, without long-term current use of insulin ([address] Allergic rhinitis, unspecified seasonality, unspecified trigger, and Mild intermittent asthma without complication were also pertinent to this visit. The chronic medical conditions listed are directly related to the primary diagnosis listed above. The management of the primary diagnosis affects the secondary diagnosis and vice versa. - Educated patient and reviewed down load from modem with the patient   - Continue medications per his PCP and other physicians.   - he instructed not to drive unless had 4 hrs of effective therapy for his RENA the night before.    - Did review the risks of under or untreated RENA including, but not limited to, higher risks of motor vehicle accidents, stroke, heart attacks, and death. - he understands and accepts all these risks. - The patient is advised to use the machine every night.   - Patient using  [employer] for supplies  - Patient not able to access video feed. Visit completed via telephone communications. 20 min spent with patient.   - Educated on tube temperature, humidifier, titration (patient refused at this), and pressure change  - Difficult to educated with frequent interuptions  -F/U: 6 months      No orders of the defined types were placed in this encounter. No orders of the defined types were placed in this encounter. No orders of the defined types were placed in this encounter.       Melany Harvey, MSN, RN, CNP

## 2020-09-30 NOTE — ASSESSMENT & PLAN NOTE
Reviewed compliance download with pt. Supplies and parts as needed for his machine. These are medically necessary. Continue medications per his PCP and other physicians. Limit caffeine use after 3pm.  Encouraged him to work on weight loss through diet and exercise. Diagnoses of Shift work sleep disorder, Obesity (BMI 30-39.9), Controlled type 2 diabetes mellitus without complication, without long-term current use of insulin ([facility] [address] Allergic rhinitis, unspecified seasonality, unspecified trigger, and Mild intermittent asthma without complication were pertinent to this visit. The chronic medical conditions listed are directly related to the primary diagnosis listed above. The management of the primary diagnosis affects the secondary diagnosis and vice versa.

## 2020-10-07 ENCOUNTER — OFFICE VISIT (OUTPATIENT)
Dept: FAMILY MEDICINE CLINIC | Age: 55
End: 2020-10-07
Payer: COMMERCIAL

## 2020-10-07 VITALS
OXYGEN SATURATION: 99 % | RESPIRATION RATE: 14 BRPM | DIASTOLIC BLOOD PRESSURE: 80 MMHG | BODY MASS INDEX: 34.52 KG/M2 | TEMPERATURE: 98.1 F | WEIGHT: 207.2 LBS | HEIGHT: 65 IN | HEART RATE: 72 BPM | SYSTOLIC BLOOD PRESSURE: 142 MMHG

## 2020-10-07 DIAGNOSIS — Z01.818 PREOP EXAMINATION: ICD-10-CM

## 2020-10-07 LAB
ANION GAP SERPL CALCULATED.3IONS-SCNC: 10 MMOL/L (ref 3–16)
BASOPHILS ABSOLUTE: 0.1 K/UL (ref 0–0.2)
BASOPHILS RELATIVE PERCENT: 1 %
BUN BLDV-MCNC: 13 MG/DL (ref 7–20)
CALCIUM SERPL-MCNC: 9.1 MG/DL (ref 8.3–10.6)
CHLORIDE BLD-SCNC: 102 MMOL/L (ref 99–110)
CO2: 25 MMOL/L (ref 21–32)
CREAT SERPL-MCNC: 0.7 MG/DL (ref 0.9–1.3)
EOSINOPHILS ABSOLUTE: 0.2 K/UL (ref 0–0.6)
EOSINOPHILS RELATIVE PERCENT: 3.5 %
GFR AFRICAN AMERICAN: >60
GFR NON-AFRICAN AMERICAN: >60
GLUCOSE BLD-MCNC: 167 MG/DL (ref 70–99)
HCT VFR BLD CALC: 39.2 % (ref 40.5–52.5)
HEMOGLOBIN: 13.5 G/DL (ref 13.5–17.5)
LYMPHOCYTES ABSOLUTE: 2 K/UL (ref 1–5.1)
LYMPHOCYTES RELATIVE PERCENT: 36.4 %
MCH RBC QN AUTO: 31.7 PG (ref 26–34)
MCHC RBC AUTO-ENTMCNC: 34.3 G/DL (ref 31–36)
MCV RBC AUTO: 92.5 FL (ref 80–100)
MONOCYTES ABSOLUTE: 0.5 K/UL (ref 0–1.3)
MONOCYTES RELATIVE PERCENT: 9.3 %
NEUTROPHILS ABSOLUTE: 2.7 K/UL (ref 1.7–7.7)
NEUTROPHILS RELATIVE PERCENT: 49.8 %
PDW BLD-RTO: 13.1 % (ref 12.4–15.4)
PLATELET # BLD: 179 K/UL (ref 135–450)
PMV BLD AUTO: 9.4 FL (ref 5–10.5)
POTASSIUM SERPL-SCNC: 4.3 MMOL/L (ref 3.5–5.1)
RBC # BLD: 4.24 M/UL (ref 4.2–5.9)
SODIUM BLD-SCNC: 137 MMOL/L (ref 136–145)
WBC # BLD: 5.5 K/UL (ref 4–11)

## 2020-10-07 PROCEDURE — 99214 OFFICE O/P EST MOD 30 MIN: CPT | Performed by: NURSE PRACTITIONER

## 2020-10-07 PROCEDURE — 93000 ELECTROCARDIOGRAM COMPLETE: CPT | Performed by: NURSE PRACTITIONER

## 2020-10-07 NOTE — PROGRESS NOTES
[address] [facility] Preoperative Evaluation       Mindy Henriquez, CNP     [address], [address]     (phone) [phone]       (fax) [phone]    Dear Dr. Ottoniel Crooks,     Thank you for referring Tatyana Mojica to me for Preoperative Evaluation. Below are the relevant portions of my assessment and plan of care. Tatyana Mojica    47 y.o.   1965    [address] [place] [address]    Vitals:    10/07/20 0946 10/07/20 1005   BP: (!) 162/108 (!) 142/80   Pulse: 72    Resp: 14    Temp: 98.1 °F (36.7 °C)    SpO2: 99%    Weight: 207 lb 3.2 oz (94 kg)    Height: 5' 5\" (1.651 m)       Wt Readings from Last 2 Encounters:   10/07/20 207 lb 3.2 oz (94 kg)   09/09/20 197 lb (89.4 kg)     BP Readings from Last 3 Encounters:   10/07/20 (!) 142/80   09/09/20 134/88   08/13/20 126/86        No Known Allergies  Current Outpatient Medications   Medication Sig Dispense Refill    ibuprofen (IBU) 600 MG tablet Take 1 tablet by mouth every 8 hours as needed for Pain 90 tablet 3    metFORMIN (GLUCOPHAGE) 500 MG tablet TAKE 1 TABLET BY MOUTH TWO TIMES A DAY WITH MEALS 180 tablet 10    lisinopril (PRINIVIL;ZESTRIL) 20 MG tablet Take 1 tablet by mouth daily 90 tablet 1    fexofenadine (ALLEGRA) 180 MG tablet Take 1 tablet by mouth daily 90 tablet 3    albuterol sulfate HFA (VENTOLIN HFA) 108 (90 Base) MCG/ACT inhaler Inhale 2 puffs into the lungs every 6 hours as needed for Wheezing 1 Inhaler 5    simvastatin (ZOCOR) 20 MG tablet Take 1 tablet by mouth nightly 90 tablet 3    glucose monitoring kit (FREESTYLE) monitoring kit Use to check BS daily 1 kit 0    fluticasone (FLONASE) 50 MCG/ACT nasal spray 2 sprays by Each Nostril route daily 1 Bottle 0    hydrocortisone (ANUSOL-HC) 2.5 % rectal cream Place rectally 2 times daily. 30 g 0    blood glucose test strips (FREESTYLE LITE) strip Use to test blood Sugar once daily as instructed.  100 strip 5    FREESTYLE LANCETS MISC USE ONCE DAILY. 100 each 3    Chlorphen-Phenyleph-APAP 2-5-325 MG TABS Take 1 tablet by mouth 4 times daily as needed (congestion and cough) 80 tablet 0    Blood Pressure KIT Please dispense one BP kit 1 kit 0    UNABLE TO FIND Per patient He is not on any OTC medication at this time. No current facility-administered medications for this visit. He presents to the office today for a preoperative consultation at the request of surgeon, Mat Park who plans on performing right knee arthroscopy on October 14 . The current problem began 2 months ago and has worsened. Conservative therapy, including none. Planned anesthesia is General.  The patient has no known anesthesia issues. Patient has no bleeding risk. Patient does not have objection to receiving blood products if needed.     Past Medical History:   Diagnosis Date    AR (allergic rhinitis)     Mild intermittent asthma without complication 55/9/0699    RENA (obstructive sleep apnea) 06/29/2017    uses CPAP    Pure hypercholesterolemia 12/3/2015    Type II or unspecified type diabetes mellitus without mention of complication, not stated as uncontrolled 1/14     Past Surgical History:   Procedure Laterality Date    CYST INCISION AND DRAINAGE      INGUINAL HERNIA REPAIR Bilateral 1/17/14    Laproscopic, with mesh; [facility]     Family History is not significant for reactions to anesthesia    Family History   Problem Relation Age of Onset    Diabetes Father     Diabetes Brother     Heart Attack Brother 46     Social History     Socioeconomic History    Marital status:      Spouse name: Gamal Goins Number of children: 3    Years of education: Not on file    Highest education level: Not on file   Occupational History    Not on file   Social Needs    Financial resource strain: Not on file    Food insecurity     Worry: Not on file     Inability: Not on file    Transportation needs     Medical: Not on file     Non-medical: Not on file   Tobacco Use    Smoking status: Former Smoker     Packs/day: 1.00     Years: 10.00     Pack years: 10.00     Types: Cigarettes     Last attempt to quit: 3/21/1993     Years since quittin.5    Smokeless tobacco: Never Used   Substance and Sexual Activity    Alcohol use: Yes     Alcohol/week: 1.0 standard drinks     Types: 1 Shots of liquor per week    Drug use: No    Sexual activity: Not on file   Lifestyle    Physical activity     Days per week: Not on file     Minutes per session: Not on file    Stress: Not on file   Relationships    Social connections     Talks on phone: Not on file     Gets together: Not on file     Attends Anabaptist service: Not on file     Active member of club or organization: Not on file     Attends meetings of clubs or organizations: Not on file     Relationship status: Not on file    Intimate partner violence     Fear of current or ex partner: Not on file     Emotionally abused: Not on file     Physically abused: Not on file     Forced sexual activity: Not on file   Other Topics Concern    Not on file   Social History Narrative    Not on file       Review of Systems  Constitutional: negative  Eyes: negative  Ears, nose, mouth, throat, and face: negative  Respiratory: negative  Cardiovascular: negative  Gastrointestinal: negative  Genitourinary:negative  Integument/breast: negative  Hematologic/lymphatic: negative  Musculoskeletal:negative except for right knee pain  Neurological: negative  Behavioral/Psych: negative  Endocrine: negative     Physical Exam   BP (!) 142/80   Pulse 72   Temp 98.1 °F (36.7 °C)   Resp 14   Ht 5' 5\" (1.651 m)   Wt 207 lb 3.2 oz (94 kg)   SpO2 99%   BMI 34.48 kg/m²   Constitutional: Patient is oriented to person, place, and time. He appears well-developed and well-nourished. No distress. Head: Normocephalic and atraumatic.    Right Ear: Tympanic membrane, external ear and ear canal normal.   Left Ear: Tympanic membrane, external ear and ear canal normal.   Nose: Nose normal.   Mouth/Throat: Oropharynx is clear and moist, and mucous membranes are normal.  There is no cervical adenopathy. There are no loose teeth. Eyes: Conjunctivae and extraocular motions are normal. Pupils are equal, round, and reactive to light bilaterally. Neck: Neck supple. No JVD present. No carotid bruit present. No mass and no thyromegaly present. Cardiovascular: Normal rate, regular rhythm, normal heart sounds and intact distal pulses. Exam reveals no gallop and no friction rub. No murmur heard. Pulmonary/Chest: Effort normal and breath sounds normal. No respiratory distress. There are no wheezes, rhonchi or rales. Abdominal: Soft, non-tender. Normal bowel sounds and aorta. There is no organomegaly, bruit or palpable mass. Neurological: He is alert and oriented to person, place, and time. No cranial nerve deficit. Coordination normal.   Skin: Skin is warm and dry. There is no rash or erythema. No suspicious lesions noted. Psychiatric: He has a normal mood and affect. Speech and behavior are normal. Judgment, cognition and memory are normal.     EKG: normal EKG, normal sinus rhythm, unchanged from previous tracings. Reviewed by Dr. Scout Sheffield       Lab Review:     Lab Results   Component Value Date    LABA1C 6.4 09/09/2020         Assessment:     47 y.o. patient with planned surgery as above. Known risk factors for perioperative complications:   Controlled type 2 diabetes mellitus without complication, without long-term current use of insulin (HCC)  Mild intermittent asthma without complication  RENA (obstructive sleep apnea)  Obesity (BMI 30-39. 9)              Plan:    1. Preoperative workup as follows: ECG, CBC, BMP.  2. Change in medication regimen before surgery:No Metformin day of surgery. No Ibuprofen 1 week before surgery . 3. Patient is cleared for upcoming surgery.          If you have questions, please do not hesitate to call me.    Sincerely,        Krystina Grider, CNP

## 2020-10-07 NOTE — PATIENT INSTRUCTIONS
No Metformin day of surgery  No Ibuprofen 1 week before surgery        Patient Education        Knee Arthroscopy: Before Your Surgery  What is knee arthroscopy? Arthroscopy is a way to find problems and do surgery inside a joint without making a large cut (incision). Your doctor puts a lighted tube with a tiny camera and surgical tools through small incisions in your knee. The camera is called an arthroscope, or scope. In this surgery, your doctor may:  · Remove or repair a torn piece of cartilage or loose bone. · Replace a torn anterior cruciate ligament (ACL) with a piece of tissue. This repair is called a graft. · Smooth the rough surfaces of your joint. Most people go home on the day of the surgery or the next day. If you have a simple injury, it may take at least 6 weeks to recover. It may take longer if your doctor had to repair damaged tissue. You will need to limit activity while your knee heals. You may need to have physical therapy (rehab) to help your knee get stronger. If you have a desk job, you may be able to go back to work a few days after treatment of a simple injury. If you lift things or stand or walk a lot at work, it may be 2 to 6 weeks before you can go back. After surgery and rehab, you will probably have less pain. Your knee should be stronger. You should be able to use your knee and leg better. Some people have to avoid lifting heavy objects. Follow-up care is a key part of your treatment and safety. Be sure to make and go to all appointments, and call your doctor if you are having problems. It's also a good idea to know your test results and keep a list of the medicines you take. How do you prepare for surgery? Surgery can be stressful. This information will help you understand what you can expect. And it will help you safely prepare for surgery. Preparing for surgery    · Be sure you have someone to take you home.  Anesthesia and pain medicine will make it unsafe for you to drive or get home on your own.     · Understand exactly what surgery is planned, along with the risks, benefits, and other options.     · Tell your doctor ALL the medicines, vitamins, supplements, and herbal remedies you take. Some may increase the risk of problems during your surgery. Your doctor will tell you if you should stop taking any of them before the surgery and how soon to do it.     · If you take aspirin or some other blood thinner, ask your doctor if you should stop taking it before your surgery. Make sure that you understand exactly what your doctor wants you to do. These medicines increase the risk of bleeding.     · Make sure your doctor and the hospital have a copy of your advance directive. If you don't have one, you may want to prepare one. It lets others know your health care wishes. It's a good thing to have before any type of surgery or procedure. What happens on the day of surgery? · Follow the instructions exactly about when to stop eating and drinking. If you don't, your surgery may be canceled. If your doctor told you to take your medicines on the day of surgery, take them with only a sip of water.     · Take a bath or shower before you come in for your surgery. Do not apply lotions, perfumes, deodorants, or nail polish.     · Do not shave the surgical site yourself.     · Take off all jewelry and piercings. And take out contact lenses, if you wear them. At the hospital or surgery center   · Bring a picture ID.     · The area for surgery is often marked to make sure there are no errors.     · You will be kept comfortable and safe by your anesthesia provider. The anesthesia may make you sleep. Or it may just numb the area being worked on.     · The surgery will take about 1 to 2 hours. It depends on how much repair needs to be done to your knee. When should you call your doctor?    · You have questions or concerns.     · You don't understand how to prepare for your surgery.     · You become ill before the surgery (such as fever, flu, or a cold).     · You need to reschedule or have changed your mind about having the surgery. Where can you learn more? Go to https://[email or web address] Renovations.[email or web address]. org and sign in to your Oravel account. Enter A298 in the [facility] box to learn more about \"Knee Arthroscopy: Before Your Surgery. \"     If you do not have an account, please click on the \"Sign Up Now\" link. Current as of: March 2, 2020               Content Version: 12.6  © 2291-8001 [employer]. Care instructions adapted under license by [facility] ([facility]). If you have questions about a medical condition or this instruction, always ask your healthcare professional. [address] any warranty or liability for your use of this information.

## 2020-11-19 ENCOUNTER — TELEPHONE (OUTPATIENT)
Dept: FAMILY MEDICINE CLINIC | Age: 55
End: 2020-11-19

## 2020-11-19 NOTE — TELEPHONE ENCOUNTER
Patient calling bobby w/fax for work  Print and fax form [address] dated 6-3-20 to the fax number provided by patient

## 2020-11-20 NOTE — TELEPHONE ENCOUNTER
Pt stopped by and asked that we fax the form to his work.   ([facility], fax number [phone]) (Pt signed)    (DONE)

## 2020-12-04 RX ORDER — SIMVASTATIN 20 MG
20 TABLET ORAL NIGHTLY
Qty: 90 TABLET | Refills: 3 | Status: SHIPPED | OUTPATIENT
Start: 2020-12-04 | End: 2021-12-02

## 2020-12-04 NOTE — TELEPHONE ENCOUNTER
Medication:   Requested Prescriptions     Pending Prescriptions Disp Refills    simvastatin (ZOCOR) 20 MG tablet [Pharmacy Med Name: Simvastatin Oral Tablet 20 MG] 90 tablet 0     Sig: TAKE 1 TABLET BY MOUTH NIGHTLY       Last Filled:  11/19/2019 #90 3rf     Patient Phone Number: [phone] (home)     Last appt: 10/7/2020 Pre-op VIKI Ulloa  Next appt: 3/10/2021    Last Lipid:   Lab Results   Component Value Date    CHOL 189 06/26/2017    TRIG 162 06/26/2017    HDL 41 11/25/2019    HDL 43 12/14/2011    LDLCALC 60 11/25/2019

## 2020-12-07 RX ORDER — LISINOPRIL 20 MG/1
20 TABLET ORAL DAILY
Qty: 90 TABLET | Refills: 1 | Status: SHIPPED | OUTPATIENT
Start: 2020-12-07 | End: 2021-01-26

## 2020-12-07 RX ORDER — LISINOPRIL 20 MG/1
TABLET ORAL
Qty: 90 TABLET | Refills: 3 | Status: SHIPPED | OUTPATIENT
Start: 2020-12-07 | End: 2021-09-02

## 2020-12-07 NOTE — TELEPHONE ENCOUNTER
Medication and Quantity requested: lisinopril (PRINIVIL;ZESTRIL) 20 MG tablet          Last Visit  10/07/20    Pharmacy and phone number updated in EPIC:  Yes  Nazia Santiago

## 2020-12-07 NOTE — TELEPHONE ENCOUNTER
Medication:   Requested Prescriptions     Pending Prescriptions Disp Refills    lisinopril (PRINIVIL;ZESTRIL) 20 MG tablet [Pharmacy Med Name: Lisinopril Oral Tablet 20 MG] 90 tablet 0     Sig: TAKE 1 TABLET BY MOUTH ONE TIME A DAY       Last Filled:  6/8/20 #90, 1 RF     Patient Phone Number: [phone] (home)     Last appt: 10/7/2020 pre op exam   Next appt: 3/10/2021    Lab Results   Component Value Date     10/07/2020    K 4.3 10/07/2020     10/07/2020    CO2 25 10/07/2020    BUN 13 10/07/2020    CREATININE 0.7 (L) 10/07/2020    GLUCOSE 167 (H) 10/07/2020    CALCIUM 9.1 10/07/2020    PROT 6.5 11/25/2019    LABALBU 4.3 11/25/2019    BILITOT 0.5 11/25/2019    ALKPHOS 67 11/25/2019    AST 15 11/25/2019    ALT 16 11/25/2019    LABGLOM >60 10/07/2020    GFRAA >60 10/07/2020    AGRATIO 2.0 11/25/2019    GLOB 2.2 11/25/2019

## 2020-12-16 ENCOUNTER — TELEPHONE (OUTPATIENT)
Dept: FAMILY MEDICINE CLINIC | Age: 55
End: 2020-12-16

## 2020-12-16 NOTE — TELEPHONE ENCOUNTER
Karina from [facility] asked who marked out provider name on RICKY and hand wrote Dr. Allison Navarro.  I advised fax was received that way

## 2020-12-22 ENCOUNTER — NURSE TRIAGE (OUTPATIENT)
Dept: OTHER | Facility: CLINIC | Age: 55
End: 2020-12-22

## 2020-12-22 ENCOUNTER — TELEPHONE (OUTPATIENT)
Dept: FAMILY MEDICINE CLINIC | Age: 55
End: 2020-12-22

## 2020-12-22 NOTE — TELEPHONE ENCOUNTER
Pt spoke with nurse triage and was recommended to be seen today or tomorrow    Right ear pain started yesterday.      Pt is asking to be squeezed in before 11 on 12/23/20 or if he can schedule with Latosha Grant, CNP

## 2020-12-22 NOTE — TELEPHONE ENCOUNTER
Right ear pain started yesterday. Reason for Disposition   Patient wants to be seen    Answer Assessment - Initial Assessment Questions  1. LOCATION: \"Which ear is involved? \"      Right ear    2. ONSET: \"When did the ear start hurting\"       Last night    3. SEVERITY: \"How bad is the pain? \"  (Scale 1-10; mild, moderate or severe)    - MILD (1-3): doesn't interfere with normal activities     - MODERATE (4-7): interferes with normal activities or awakens from sleep     - SEVERE (8-10): excruciating pain, unable to do any normal activities       Mild    4. URI SYMPTOMS: \"Do you have a runny nose or cough? \"      Denies    5. FEVER: \"Do you have a fever? \" If so, ask: \"What is your temperature, how was it measured, and when did it start? \"      Denies    6. CAUSE: \"Have you been swimming recently? \", \"How often do you use Q-TIPS? \", \"Have you had any recent air travel or scuba diving? \"      q-tips    7. OTHER SYMPTOMS: \"Do you have any other symptoms? \" (e.g., headache, stiff neck, dizziness, vomiting, runny nose, decreased hearing)      Dizzy    8. PREGNANCY: \"Is there any chance you are pregnant? \" \"When was your last menstrual period? \"      Na    Protocols used: EARACHE-ADULT-OH    Patient called pre-service center [facility]) to schedule appointment, with red flag complaint, transferred to RN access for triage. See above questions and answers. Caller talking full sentences without any distress on phone. Discussed disposition and patient agreeable. Discussed potential consequences for not following disposition recommendation. Aware to call back with any concerns or persistent, worsening, or new symptoms develop. Warm transfer to [facility] scheduling for appointment. Attention Provider: Thank you for allowing me to participate in the care of your patient. The  patient was connected to triage in response to information provided to the [facility].  Please do not respond through this encounter as the response is not directed to a shared pool.

## 2020-12-23 ENCOUNTER — OFFICE VISIT (OUTPATIENT)
Dept: FAMILY MEDICINE CLINIC | Age: 55
End: 2020-12-23
Payer: COMMERCIAL

## 2020-12-23 VITALS
RESPIRATION RATE: 18 BRPM | TEMPERATURE: 97.7 F | WEIGHT: 209 LBS | DIASTOLIC BLOOD PRESSURE: 88 MMHG | BODY MASS INDEX: 34.82 KG/M2 | HEIGHT: 65 IN | OXYGEN SATURATION: 98 % | HEART RATE: 64 BPM | SYSTOLIC BLOOD PRESSURE: 136 MMHG

## 2020-12-23 PROCEDURE — 99213 OFFICE O/P EST LOW 20 MIN: CPT | Performed by: FAMILY MEDICINE

## 2020-12-23 RX ORDER — AMOXICILLIN AND CLAVULANATE POTASSIUM 875; 125 MG/1; MG/1
1 TABLET, FILM COATED ORAL 2 TIMES DAILY
Qty: 20 TABLET | Refills: 0 | Status: SHIPPED | OUTPATIENT
Start: 2020-12-23 | End: 2021-01-02

## 2020-12-23 NOTE — PROGRESS NOTES
He is not on any OTC medication at this time. Yes Historical Provider, MD   hydrocortisone (ANUSOL-HC) 2.5 % rectal cream Place rectally 2 times daily. Patient not taking: Reported on 12/23/2020  Richardes Query, APRN - CNP   Chlorphen-Phenyleph-APAP 2-5-325 MG TABS Take 1 tablet by mouth 4 times daily as needed (congestion and cough)  Patient not taking: Reported on 12/23/2020  Urbano Query, APRN - CNP       OBJECTIVE:  /88   Pulse 64   Temp 97.7 °F (36.5 °C)   Resp 18   Ht 5' 5\" (1.651 m)   Wt 209 lb (94.8 kg)   SpO2 98%   BMI 34.78 kg/m²   GEN:  in NAD, appears well  HEENT:  NCAT, TMs:normal and throat: clear, right TM is red dull and cloudy left is normal  NECK:  Supple without adenopathy. CV:  Regular rate and rhythm, S1 and S2 normal, no murmurs, clicks  PULM:  Chest is clear, no wheezing ,  symmetric air entry throughout both lung fields.   NEURO: Alert and oriented ×3    ASSESSMENT/PLAN:  Right otitis media    Augmentin  Advil  Warning signs

## 2021-01-25 ENCOUNTER — TELEPHONE (OUTPATIENT)
Dept: FAMILY MEDICINE CLINIC | Age: 56
End: 2021-01-25

## 2021-01-25 NOTE — TELEPHONE ENCOUNTER
Patient is asking for an appointment for allergies, HA,   head congestion,phlegm, slight SOB  X 1 week  No Covid exposure  Patient is asking for appt. w/ or Amna Nguyen?   Contact patient

## 2021-01-26 ENCOUNTER — TELEPHONE (OUTPATIENT)
Dept: FAMILY MEDICINE CLINIC | Age: 56
End: 2021-01-26

## 2021-01-26 ENCOUNTER — VIRTUAL VISIT (OUTPATIENT)
Dept: FAMILY MEDICINE CLINIC | Age: 56
End: 2021-01-26
Payer: COMMERCIAL

## 2021-01-26 DIAGNOSIS — B96.89 ACUTE BACTERIAL SINUSITIS: Primary | ICD-10-CM

## 2021-01-26 DIAGNOSIS — R68.89 FORGETFULNESS: ICD-10-CM

## 2021-01-26 DIAGNOSIS — J01.90 ACUTE BACTERIAL SINUSITIS: Primary | ICD-10-CM

## 2021-01-26 DIAGNOSIS — F41.9 ANXIETY: ICD-10-CM

## 2021-01-26 PROCEDURE — 99213 OFFICE O/P EST LOW 20 MIN: CPT | Performed by: NURSE PRACTITIONER

## 2021-01-26 RX ORDER — CEFUROXIME AXETIL 250 MG/1
250 TABLET ORAL 2 TIMES DAILY
Qty: 20 TABLET | Refills: 0 | Status: SHIPPED | OUTPATIENT
Start: 2021-01-26 | End: 2021-02-05

## 2021-01-26 ASSESSMENT — PATIENT HEALTH QUESTIONNAIRE - PHQ9
1. LITTLE INTEREST OR PLEASURE IN DOING THINGS: 0
SUM OF ALL RESPONSES TO PHQ QUESTIONS 1-9: 0
SUM OF ALL RESPONSES TO PHQ QUESTIONS 1-9: 0

## 2021-01-26 ASSESSMENT — ENCOUNTER SYMPTOMS
WHEEZING: 1
SHORTNESS OF BREATH: 0
SINUS PRESSURE: 1
SORE THROAT: 1
RHINORRHEA: 1
CHEST TIGHTNESS: 0
COUGH: 1

## 2021-01-26 NOTE — PATIENT INSTRUCTIONS
Patient Education        Sinusitis: Care Instructions  Your Care Instructions     Sinusitis is an infection of the lining of the sinus cavities in your head. Sinusitis often follows a cold. It causes pain and pressure in your head and face. In most cases, sinusitis gets better on its own in 1 to 2 weeks. But some mild symptoms may last for several weeks. Sometimes antibiotics are needed. Follow-up care is a key part of your treatment and safety. Be sure to make and go to all appointments, and call your doctor if you are having problems. It's also a good idea to know your test results and keep a list of the medicines you take. How can you care for yourself at home? · Take an over-the-counter pain medicine, such as acetaminophen (Tylenol), ibuprofen (Advil, Motrin), or naproxen (Aleve). Read and follow all instructions on the label. · If the doctor prescribed antibiotics, take them as directed. Do not stop taking them just because you feel better. You need to take the full course of antibiotics. · Be careful when taking over-the-counter cold or flu medicines and Tylenol at the same time. Many of these medicines have acetaminophen, which is Tylenol. Read the labels to make sure that you are not taking more than the recommended dose. Too much acetaminophen (Tylenol) can be harmful. · Breathe warm, moist air from a steamy shower, a hot bath, or a sink filled with hot water. Avoid cold, dry air. Using a humidifier in your home may help. Follow the directions for cleaning the machine. · Use saline (saltwater) nasal washes to help keep your nasal passages open and wash out mucus and bacteria. You can buy saline nose drops at a grocery store or drugstore. Or you can make your own at home by adding 1 teaspoon of salt and 1 teaspoon of baking soda to 2 cups of distilled water. If you make your own, fill a bulb syringe with the solution, insert the tip into your nostril, and squeeze gently. Mary Benito your nose. · Put a hot, wet towel or a warm gel pack on your face 3 or 4 times a day for 5 to 10 minutes each time. · Try a decongestant nasal spray like oxymetazoline (Afrin). Do not use it for more than 3 days in a row. Using it for more than 3 days can make your congestion worse. When should you call for help? Call your doctor now or seek immediate medical care if:    · You have new or worse swelling or redness in your face or around your eyes.     · You have a new or higher fever. Watch closely for changes in your health, and be sure to contact your doctor if:    · You have new or worse facial pain.     · The mucus from your nose becomes thicker (like pus) or has new blood in it.     · You are not getting better as expected. Where can you learn more? Go to https://[email or web address] Cattle Industryeb.Fear Hunters. org and sign in to your Waggl account. Enter M524 in the [facility] box to learn more about \"Sinusitis: Care Instructions. \"     If you do not have an account, please click on the \"Sign Up Now\" link. Current as of: April 15, 2020               Content Version: 12.6  © 4192-9101 inploid.com, Incorporated. Care instructions adapted under license by [facility] ([facility]). If you have questions about a medical condition or this instruction, always ask your healthcare professional. [address] any warranty or liability for your use of this information.
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
## 2021-02-04 ENCOUNTER — TELEPHONE (OUTPATIENT)
Dept: FAMILY MEDICINE CLINIC | Age: 56
End: 2021-02-04

## 2021-02-04 NOTE — TELEPHONE ENCOUNTER
Pt had a VV visit with Blu Pederson January 26th. Pt started a anti-biotic to treat a sinus infection. Pt will finish the medication in 2 days. Pt started feeling better on Monday, but today feels worse. Pt has a lot of phlegm and some congestion. Pt would like another anti-biotic. [facility]     Call pt to discuss.

## 2021-03-10 ENCOUNTER — OFFICE VISIT (OUTPATIENT)
Dept: FAMILY MEDICINE CLINIC | Age: 56
End: 2021-03-10
Payer: COMMERCIAL

## 2021-03-10 VITALS
TEMPERATURE: 98.2 F | DIASTOLIC BLOOD PRESSURE: 80 MMHG | WEIGHT: 196 LBS | OXYGEN SATURATION: 99 % | HEART RATE: 59 BPM | SYSTOLIC BLOOD PRESSURE: 136 MMHG | BODY MASS INDEX: 32.62 KG/M2

## 2021-03-10 DIAGNOSIS — E11.9 CONTROLLED TYPE 2 DIABETES MELLITUS WITHOUT COMPLICATION, WITHOUT LONG-TERM CURRENT USE OF INSULIN (HCC): Chronic | ICD-10-CM

## 2021-03-10 DIAGNOSIS — J30.9 ALLERGIC RHINITIS, UNSPECIFIED SEASONALITY, UNSPECIFIED TRIGGER: Chronic | ICD-10-CM

## 2021-03-10 DIAGNOSIS — J45.20 MILD INTERMITTENT ASTHMA WITHOUT COMPLICATION: Chronic | ICD-10-CM

## 2021-03-10 DIAGNOSIS — J45.21 MILD INTERMITTENT ASTHMA WITH ACUTE EXACERBATION: ICD-10-CM

## 2021-03-10 DIAGNOSIS — G47.33 OSA (OBSTRUCTIVE SLEEP APNEA): ICD-10-CM

## 2021-03-10 DIAGNOSIS — E78.00 PURE HYPERCHOLESTEROLEMIA: Chronic | ICD-10-CM

## 2021-03-10 LAB
CREATININE URINE POCT: 100
HBA1C MFR BLD: 6.7 %
MICROALBUMIN/CREAT 24H UR: 30 MG/G{CREAT}
MICROALBUMIN/CREAT UR-RTO: <30

## 2021-03-10 PROCEDURE — 99214 OFFICE O/P EST MOD 30 MIN: CPT | Performed by: FAMILY MEDICINE

## 2021-03-10 PROCEDURE — 82044 UR ALBUMIN SEMIQUANTITATIVE: CPT | Performed by: FAMILY MEDICINE

## 2021-03-10 PROCEDURE — 83036 HEMOGLOBIN GLYCOSYLATED A1C: CPT | Performed by: FAMILY MEDICINE

## 2021-03-10 RX ORDER — FLUTICASONE PROPIONATE 50 MCG
2 SPRAY, SUSPENSION (ML) NASAL DAILY
Qty: 3 BOTTLE | Refills: 1 | Status: SHIPPED | OUTPATIENT
Start: 2021-03-10 | End: 2021-10-29

## 2021-03-10 RX ORDER — ALBUTEROL SULFATE 90 UG/1
2 AEROSOL, METERED RESPIRATORY (INHALATION) EVERY 6 HOURS PRN
Qty: 1 INHALER | Refills: 5 | Status: SHIPPED | OUTPATIENT
Start: 2021-03-10 | End: 2022-10-26

## 2021-03-10 NOTE — PROGRESS NOTES
Jarvis Berger is a 54 y.o. male. HPI: Here for complex medical visit  Allergies have been bad lately on Allegra  He is also using his skew inhaler twice a day  He is compliant with his CPAP  Denies numbness or tingling in his feet no open sores  Meds, vitamins and allergies reviewed with pt    ROS: No TIA's or unusual headaches, no dysphagia. No prolonged cough. No dyspnea or chest pain on exertion. No abdominal pain, change in bowel habits, black or bloody stools. No urinary tract symptoms. No new or unusual musculoskeletal symptoms. Prior to Visit Medications    Medication Sig Taking? Authorizing Provider   lisinopril (PRINIVIL;ZESTRIL) 20 MG tablet TAKE 1 TABLET BY MOUTH ONE TIME A DAY  Yes Desmond Salmeron MD   simvastatin (ZOCOR) 20 MG tablet TAKE 1 TABLET BY MOUTH NIGHTLY Yes Desmond Salmeron MD   ibuprofen (IBU) 600 MG tablet Take 1 tablet by mouth every 8 hours as needed for Pain Yes Kvng Abarca, APRN - CNP   metFORMIN (GLUCOPHAGE) 500 MG tablet TAKE 1 TABLET BY MOUTH TWO TIMES A DAY WITH MEALS Yes Desmond Salmeron MD   fexofenadine (ALLEGRA) 180 MG tablet Take 1 tablet by mouth daily Yes Kvng Abarca, APRN - MARRY   albuterol sulfate HFA (VENTOLIN HFA) 108 (90 Base) MCG/ACT inhaler Inhale 2 puffs into the lungs every 6 hours as needed for Wheezing Yes Kvng Abarca, APRN [place] TUTTLE   glucose monitoring kit (FREESTYLE) monitoring kit Use to check BS daily Yes Kvng Ask, APRN - CNP   fluticasone (FLONASE) 50 MCG/ACT nasal spray 2 sprays by Each Nostril route daily Yes Desmond Salmeron MD   hydrocortisone (ANUSOL-HC) 2.5 % rectal cream Place rectally 2 times daily. Yes Kvng Abarca, APRN - CNP   blood glucose test strips (FREESTYLE LITE) strip Use to test blood Sugar once daily as instructed. Yes Desmond Salmeron MD   FREESTYLE LANCETS MISC USE ONCE DAILY.   Yes Desmond Salmeron MD   Chlorphen-Phenyleph-APAP 2-5-325 MG TABS Take 1 tablet by mouth 4 times daily as needed (congestion and cough) Yes CHRISTIAN Arenas CNP   Blood Pressure KIT Please dispense one BP kit Yes CHRISTIAN Arenas CNP   UNABLE TO FIND Per patient He is not on any OTC medication at this time. Yes Historical Provider, MD       Past Medical History:   Diagnosis Date    AR (allergic rhinitis)     Mild intermittent asthma without complication     RENA (obstructive sleep apnea) 2017    uses CPAP    Pure hypercholesterolemia 12/3/2015    Type II or unspecified type diabetes mellitus without mention of complication, not stated as uncontrolled        Social History     Tobacco Use    Smoking status: Former Smoker     Packs/day: 1.00     Years: 10.00     Pack years: 10.00     Types: Cigarettes     Quit date: 3/21/1993     Years since quittin.9    Smokeless tobacco: Never Used   Substance Use Topics    Alcohol use: Yes     Alcohol/week: 1.0 standard drinks     Types: 1 Shots of liquor per week    Drug use: No       Family History   Problem Relation Age of Onset    Diabetes Father     Diabetes Brother     Heart Attack Brother 51       No Known Allergies    OBJECTIVE:  /80   Pulse 59   Temp 98.2 °F (36.8 °C)   Wt 196 lb (88.9 kg)   SpO2 99%   BMI 32.62 kg/m²   GEN:  in NAD, wt down 13 #  NECK:  Supple without adenopathy. no bruit  CV:  Regular rate and rhythm, S1 and S2 normal, no murmurs, clicks  PULM:  Chest is clear, no wheezing ,  symmetric air entry throughout both lung fields. EXT: No rash or edema  NEURO: nonfocal  a1c - 6.7  ASSESSMENT/PLAN:  1. Allergic rhinitis, unspecified seasonality, unspecified trigger - partially treted  Allegra, add flonase    2. Controlled type 2 diabetes mellitus without complication, without long-term current use of insulin (HCC)  No paresthesia  Getting regular exercise  See opthal    3. Mod intermittent asthma without complication  Using rescue inhaler bid now  Add advair    4. RENA (obstructive sleep apnea)  CPAP compliant    5.  Pure hypercholesterolemia  Lipid panel at nest visit    6 covid vaccine when avail  Then shingrx in 6 mo

## 2021-06-03 ENCOUNTER — OFFICE VISIT (OUTPATIENT)
Dept: FAMILY MEDICINE CLINIC | Age: 56
End: 2021-06-03
Payer: COMMERCIAL

## 2021-06-03 VITALS
SYSTOLIC BLOOD PRESSURE: 137 MMHG | DIASTOLIC BLOOD PRESSURE: 85 MMHG | BODY MASS INDEX: 31.99 KG/M2 | OXYGEN SATURATION: 95 % | HEART RATE: 64 BPM | WEIGHT: 192 LBS | HEIGHT: 65 IN

## 2021-06-03 DIAGNOSIS — J30.9 ALLERGIC RHINITIS, UNSPECIFIED SEASONALITY, UNSPECIFIED TRIGGER: Primary | ICD-10-CM

## 2021-06-03 PROCEDURE — 99214 OFFICE O/P EST MOD 30 MIN: CPT | Performed by: FAMILY MEDICINE

## 2021-06-03 RX ORDER — OLOPATADINE HYDROCHLORIDE 1 MG/ML
1 SOLUTION/ DROPS OPHTHALMIC 2 TIMES DAILY
Qty: 1 BOTTLE | Refills: 0 | Status: SHIPPED | OUTPATIENT
Start: 2021-06-03 | End: 2021-07-03

## 2021-06-03 SDOH — ECONOMIC STABILITY: FOOD INSECURITY: WITHIN THE PAST 12 MONTHS, YOU WORRIED THAT YOUR FOOD WOULD RUN OUT BEFORE YOU GOT MONEY TO BUY MORE.: NEVER TRUE

## 2021-06-03 SDOH — ECONOMIC STABILITY: FOOD INSECURITY: WITHIN THE PAST 12 MONTHS, THE FOOD YOU BOUGHT JUST DIDN'T LAST AND YOU DIDN'T HAVE MONEY TO GET MORE.: NEVER TRUE

## 2021-06-03 ASSESSMENT — SOCIAL DETERMINANTS OF HEALTH (SDOH): HOW HARD IS IT FOR YOU TO PAY FOR THE VERY BASICS LIKE FOOD, HOUSING, MEDICAL CARE, AND HEATING?: NOT HARD AT ALL

## 2021-06-03 NOTE — LETTER
[address]  Phone: [phone]  Fax: [phone]    Mike Handley MD        Re: Anny Burleson  :  [birth year]     To Whom It May  Concern:     Due to his medical conditions, please limit work for [employer] to 48 hrs per week, 8 hours per days max.   No other specific work restrictions.     Thank you for your cooperation.     Sincerely,      Mike Handley MD

## 2021-06-03 NOTE — PROGRESS NOTES
Liz Malave is a 54 y.o. male. HPI:  Allergies acting up, especially  Runny eyes  Uses flonase, advair qd only, allegra, albuterol prn, and otc eye qtts    Wt Readings from Last 3 Encounters:   06/03/21 192 lb (87.1 kg)   03/10/21 196 lb (88.9 kg)   12/23/20 209 lb (94.8 kg)     Meds, vitamins and allergies reviewed with Patient    ROS:  Gen:no  fever  HEENT: no cold symptoms, nosore throat. CV:  Denies chest pain or palpitations. Pulm:  Denies shortness of breath, cough. Abd:  Denies abdominal pain, nausea and vomiting. Skin: no rash    No Known Allergies    Prior to Visit Medications    Medication Sig Taking? Authorizing Provider   fexofenadine (ALLEGRA) 180 MG tablet TAKE 1 TABLET BY MOUTH ONE TIME A DAY  Yes Liza Estrada MD   fluticasone (FLONASE) 50 MCG/ACT nasal spray 2 sprays by Each Nostril route daily Yes Liza Estrada MD   fluticasone-salmeterol (ADVAIR DISKUS) 250-50 MCG/DOSE AEPB Inhale 1 puff into the lungs every 12 hours Yes Liza Estrada MD   albuterol sulfate HFA (VENTOLIN HFA) 108 (90 Base) MCG/ACT inhaler Inhale 2 puffs into the lungs every 6 hours as needed for Wheezing Yes Liza Estrada MD   lisinopril (PRINIVIL;ZESTRIL) 20 MG tablet TAKE 1 TABLET BY MOUTH ONE TIME A DAY  Yes Liza Estrada MD   simvastatin (ZOCOR) 20 MG tablet TAKE 1 TABLET BY MOUTH NIGHTLY Yes Liza Estrada MD   ibuprofen (IBU) 600 MG tablet Take 1 tablet by mouth every 8 hours as needed for Pain Yes CHRISTIAN Mckeon CNP   metFORMIN (GLUCOPHAGE) 500 MG tablet TAKE 1 TABLET BY MOUTH TWO TIMES A DAY WITH MEALS Yes Liza Estrada MD   glucose monitoring kit (FREESTYLE) monitoring kit Use to check BS daily Yes CHRISTIAN Mckeon CNP   fluticasone (FLONASE) 50 MCG/ACT nasal spray 2 sprays by Each Nostril route daily Yes Liza Estrada MD   hydrocortisone (ANUSOL-HC) 2.5 % rectal cream Place rectally 2 times daily.  Yes Tacey Pries, CHRISTIAN - CNP   blood glucose test strips

## 2021-06-04 ENCOUNTER — TELEPHONE (OUTPATIENT)
Dept: FAMILY MEDICINE CLINIC | Age: 56
End: 2021-06-04

## 2021-06-04 NOTE — TELEPHONE ENCOUNTER
Patient dropped off paper work to be filled out for work restrictions. Paperwork is scanned in and handed original copy to Dr Stephany Travis. Pt will  Monday or Tuesday.

## 2021-06-04 NOTE — TELEPHONE ENCOUNTER
Form for work completed, please scan and have ready for patient to  at the front office Monday or Tuesday

## 2021-06-23 DIAGNOSIS — E11.9 CONTROLLED TYPE 2 DIABETES MELLITUS WITHOUT COMPLICATION, WITHOUT LONG-TERM CURRENT USE OF INSULIN (HCC): Chronic | ICD-10-CM

## 2021-06-23 NOTE — TELEPHONE ENCOUNTER
Medication:   Requested Prescriptions     Pending Prescriptions Disp Refills    blood glucose test strips (FREESTYLE LITE) strip [Pharmacy Med Name: FreeStyle Lite Test In Vitro Strip] 100 each 0     Sig: USE TO TEST BLOOD SUGAR ONCE DAILY AS INSTRUCTED    FreeStyle Lancets [facility] [Pharmacy Med Name: FreeStyle Lancets Miscellaneous] 100 each 0     Sig: use one time daily       Last Filled:  1 x 0 RF 11/19/20    Patient Phone Number: [phone] (home)     Last appt:6/3/21  Next appt: Visit date not found    Last Labs DM:   Lab Results   Component Value Date    LABA1C 6.7 03/10/2021

## 2021-06-24 RX ORDER — LANCETS 28 GAUGE
EACH MISCELLANEOUS
Qty: 100 EACH | Refills: 0 | Status: SHIPPED | OUTPATIENT
Start: 2021-06-24

## 2021-06-24 RX ORDER — BLOOD-GLUCOSE METER
KIT MISCELLANEOUS
Qty: 100 EACH | Refills: 0 | Status: SHIPPED | OUTPATIENT
Start: 2021-06-24

## 2021-09-02 DIAGNOSIS — E11.9 DIABETES TYPE 2, CONTROLLED (HCC): ICD-10-CM

## 2021-09-02 RX ORDER — LISINOPRIL 20 MG/1
TABLET ORAL
Qty: 90 TABLET | Refills: 2 | Status: SHIPPED | OUTPATIENT
Start: 2021-09-02 | End: 2022-06-02

## 2021-09-02 NOTE — TELEPHONE ENCOUNTER
Medication:   Requested Prescriptions     Pending Prescriptions Disp Refills    metFORMIN (GLUCOPHAGE) 500 MG tablet [Pharmacy Med Name: metFORMIN HCl Oral Tablet 500 MG] 180 tablet 0     Sig: TAKE 1 TABLET BY MOUTH TWO TIMES A DAY WITH MEALS    lisinopril (PRINIVIL;ZESTRIL) 20 MG tablet [Pharmacy Med Name: Lisinopril Oral Tablet 20 MG] 90 tablet 0     Sig: TAKE 1 TABLET BY MOUTH EVERY DAY       Last Filled:    Metformin 7/28/20 #180,10 RF  Lisinopril 12/7/20 #90, 3 RF  Patient Phone Number: [phone] (home)     Last appt: 6/3/21 allergic rhinitis   Next appt: Visit date not found    Lab Results   Component Value Date     10/07/2020    K 4.3 10/07/2020     10/07/2020    CO2 25 10/07/2020    BUN 13 10/07/2020    CREATININE 0.7 (L) 10/07/2020    GLUCOSE 167 (H) 10/07/2020    CALCIUM 9.1 10/07/2020    PROT 6.5 11/25/2019    LABALBU 4.3 11/25/2019    BILITOT 0.5 11/25/2019    ALKPHOS 67 11/25/2019    AST 15 11/25/2019    ALT 16 11/25/2019    LABGLOM >60 10/07/2020    GFRAA >60 10/07/2020    AGRATIO 2.0 11/25/2019    GLOB 2.2 11/25/2019

## 2021-09-30 ENCOUNTER — OFFICE VISIT (OUTPATIENT)
Dept: FAMILY MEDICINE CLINIC | Age: 56
End: 2021-09-30
Payer: COMMERCIAL

## 2021-09-30 VITALS
OXYGEN SATURATION: 97 % | HEART RATE: 76 BPM | HEIGHT: 65 IN | BODY MASS INDEX: 31.99 KG/M2 | WEIGHT: 192 LBS | SYSTOLIC BLOOD PRESSURE: 147 MMHG | DIASTOLIC BLOOD PRESSURE: 81 MMHG

## 2021-09-30 DIAGNOSIS — H69.82 DYSFUNCTION OF LEFT EUSTACHIAN TUBE: Primary | ICD-10-CM

## 2021-09-30 PROCEDURE — 99213 OFFICE O/P EST LOW 20 MIN: CPT | Performed by: FAMILY MEDICINE

## 2021-09-30 NOTE — PROGRESS NOTES
Christiano Riggins is a 54 y.o. male. HPI:  Left ear feels clogged slightly tender for the last 2 to 3 days  No fever chills but does have some pain in front of his left ear this worse with chewing as well  Allergies are bad he is taking Allegra but has not started his Flonase  Had an ear infection in [facility] 2 months ago and took amoxicillin without resolve      Wt Readings from Last 3 Encounters:   09/30/21 192 lb (87.1 kg)   06/03/21 192 lb (87.1 kg)   03/10/21 196 lb (88.9 kg)     Meds, vitamins and allergies reviewed with Patient    ROS:  Gen: No fever  HEENT: No cold symptoms, no sore throat. CV:  Denies chest pain or palpitations. Pulm:  Denies shortness of breath, cough. Abd:  Denies abdominal pain, nausea and vomiting. Skin: no rash    No Known Allergies    Prior to Visit Medications    Medication Sig Taking?  Authorizing Provider   metFORMIN (GLUCOPHAGE) 500 MG tablet TAKE 1 TABLET BY MOUTH TWO TIMES A DAY WITH MEALS Yes Tia Santana MD   lisinopril (PRINIVIL;ZESTRIL) 20 MG tablet TAKE 1 TABLET BY MOUTH EVERY DAY  Yes Tia Santana MD   blood glucose test strips (FREESTYLE LITE) strip USE TO TEST BLOOD SUGAR ONCE DAILY AS INSTRUCTED Yes Tia Santana MD   FreeStyle Lancets MISC use one time daily Yes Tia Santana MD   fexofenadine (ALLEGRA) 180 MG tablet TAKE 1 TABLET BY MOUTH ONE TIME A DAY  Yes Tia Santana MD   fluticasone (FLONASE) 50 MCG/ACT nasal spray 2 sprays by Each Nostril route daily Yes Tia Santana MD   fluticasone-salmeterol (ADVAIR DISKUS) 250-50 MCG/DOSE AEPB Inhale 1 puff into the lungs every 12 hours Yes Tia Santana MD   albuterol sulfate HFA (VENTOLIN HFA) 108 (90 Base) MCG/ACT inhaler Inhale 2 puffs into the lungs every 6 hours as needed for Wheezing Yes Tia Santana MD   simvastatin (ZOCOR) 20 MG tablet TAKE 1 TABLET BY MOUTH NIGHTLY Yes Tia Santana MD   ibuprofen (IBU) 600 MG tablet Take 1 tablet by mouth every 8 hours as needed for Pain Yes CHRISTIAN Ralph CNP   glucose monitoring kit (FREESTYLE) monitoring kit Use to check BS daily Yes CHRISTIAN Ralph CNP   fluticasone (FLONASE) 50 MCG/ACT nasal spray 2 sprays by Each Nostril route daily Yes Nai Zuñiga MD   hydrocortisone (ANUSOL-HC) 2.5 % rectal cream Place rectally 2 times daily. Yes CHRISTIAN Ralph CNP   Chlorphen-Phenyleph-APAP 2-5-325 MG TABS Take 1 tablet by mouth 4 times daily as needed (congestion and cough) Yes CHRISTIAN Ralph CNP   Blood Pressure KIT Please dispense one BP kit Yes CHRISITAN Ralph CNP   UNABLE TO FIND Per patient He is not on any OTC medication at this time. Yes Historical Provider, MD       OBJECTIVE:  BP (!) 147/81   Pulse 76   Ht 5' 5\" (1.651 m)   Wt 192 lb (87.1 kg)   SpO2 97%   BMI 31.95 kg/m²   GEN:  in NAD  HEENT:  NCAT, TMs:normal and throat: clear left canal and tm wnl, sli dull only  Mastoid on left sli tender  Left tmj - sli tender  NECK:  Supple without adenopathy. CV:  Regular rate and rhythm, S1 and S2 normal, no murmurs, clicks  PULM:  Chest is clear, no wheezing ,  symmetric air entry throughout both lung fields.   NEURO: Alert and oriented ×3    ASSESSMENT/PLAN:  etd on left  Left tmj    flonase  Jaw rest  Allegra  Motrin  heat

## 2021-10-06 ENCOUNTER — TELEPHONE (OUTPATIENT)
Dept: FAMILY MEDICINE CLINIC | Age: 56
End: 2021-10-06

## 2021-10-06 NOTE — TELEPHONE ENCOUNTER
Pt dropped off Annual Physical Voucher from insurance to be filled out by physician.      Please inform pt when forms are ready for  [phone]

## 2021-10-07 NOTE — TELEPHONE ENCOUNTER
Form is complete, please scan and call patient to come pick it up ,remind him that he needs to sign it

## 2021-10-18 ENCOUNTER — OFFICE VISIT (OUTPATIENT)
Dept: FAMILY MEDICINE CLINIC | Age: 56
End: 2021-10-18
Payer: COMMERCIAL

## 2021-10-18 VITALS
WEIGHT: 194 LBS | BODY MASS INDEX: 29.4 KG/M2 | OXYGEN SATURATION: 98 % | HEIGHT: 68 IN | DIASTOLIC BLOOD PRESSURE: 60 MMHG | HEART RATE: 73 BPM | SYSTOLIC BLOOD PRESSURE: 112 MMHG

## 2021-10-18 DIAGNOSIS — H69.82 DYSFUNCTION OF LEFT EUSTACHIAN TUBE: Primary | ICD-10-CM

## 2021-10-18 DIAGNOSIS — G47.33 OSA (OBSTRUCTIVE SLEEP APNEA): ICD-10-CM

## 2021-10-18 DIAGNOSIS — Z23 NEED FOR SHINGLES VACCINE: ICD-10-CM

## 2021-10-18 DIAGNOSIS — E78.00 PURE HYPERCHOLESTEROLEMIA: Chronic | ICD-10-CM

## 2021-10-18 DIAGNOSIS — E11.9 CONTROLLED TYPE 2 DIABETES MELLITUS WITHOUT COMPLICATION, WITHOUT LONG-TERM CURRENT USE OF INSULIN (HCC): ICD-10-CM

## 2021-10-18 DIAGNOSIS — J45.20 MILD INTERMITTENT ASTHMA WITHOUT COMPLICATION: Chronic | ICD-10-CM

## 2021-10-18 LAB — HBA1C MFR BLD: 6.3 %

## 2021-10-18 PROCEDURE — 90471 IMMUNIZATION ADMIN: CPT | Performed by: FAMILY MEDICINE

## 2021-10-18 PROCEDURE — 90750 HZV VACC RECOMBINANT IM: CPT | Performed by: FAMILY MEDICINE

## 2021-10-18 PROCEDURE — 83036 HEMOGLOBIN GLYCOSYLATED A1C: CPT | Performed by: FAMILY MEDICINE

## 2021-10-18 PROCEDURE — 99214 OFFICE O/P EST MOD 30 MIN: CPT | Performed by: FAMILY MEDICINE

## 2021-10-18 NOTE — PROGRESS NOTES
Avelina Segovia is a 64 y.o. male. HPI:here for complex medcial visit  Sugars at home 130-150  Denies significant numbness tingling or open sores on his feet  Saw his eye doctor 3 months ago but we have no documentation  Has been following up with orthopedics ever since he had an arthroscopy in the right knee for torn meniscus as the knee continues to give him trouble  He is now in a brace and is also seeking a Worker's Comp. second opinion  CPAP compliant but still snores, interestingly when he visited [address] [place] [address] for  4 weeks he had no snoring without a CPAP machine  Meds, vitamins and allergies reviewed with pt    ROS: No TIA's or unusual headaches, no dysphagia. No prolonged cough. No dyspnea or chest pain on exertion. No abdominal pain, change in bowel habits, black or bloody stools. No urinary tract symptoms. No new or unusual musculoskeletal symptoms. Prior to Visit Medications    Medication Sig Taking?  Authorizing Provider   metFORMIN (GLUCOPHAGE) 500 MG tablet TAKE 1 TABLET BY MOUTH TWO TIMES A DAY WITH MEALS Yes Excell Osler, MD   lisinopril (PRINIVIL;ZESTRIL) 20 MG tablet TAKE 1 TABLET BY MOUTH EVERY DAY  Yes Excell Osler, MD   blood glucose test strips (FREESTYLE LITE) strip USE TO TEST BLOOD SUGAR ONCE DAILY AS INSTRUCTED Yes Excell Osler, MD   FreeStyle Lancets MISC use one time daily Yes Excell Osler, MD   fexofenadine (ALLEGRA) 180 MG tablet TAKE 1 TABLET BY MOUTH ONE TIME A DAY  Yes Excell Osler, MD   fluticasone (FLONASE) 50 MCG/ACT nasal spray 2 sprays by Each Nostril route daily Yes Excell Osler, MD   fluticasone-salmeterol (ADVAIR DISKUS) 250-50 MCG/DOSE AEPB Inhale 1 puff into the lungs every 12 hours Yes Excell Osler, MD   albuterol sulfate HFA (VENTOLIN HFA) 108 (90 Base) MCG/ACT inhaler Inhale 2 puffs into the lungs every 6 hours as needed for Wheezing Yes Excell Osler, MD   simvastatin (ZOCOR) 20 MG tablet TAKE 1 TABLET BY MOUTH NIGHTLY Yes Excell Osler, MD   ibuprofen (IBU) 600 MG tablet Take 1 tablet by mouth every 8 hours as needed for Pain Yes CHRISTIAN Johnson CNP   glucose monitoring kit (FREESTYLE) monitoring kit Use to check BS daily Yes CHRISTIAN Johnson CNP   fluticasone (FLONASE) 50 MCG/ACT nasal spray 2 sprays by Each Nostril route daily Yes Excell Osler, MD   hydrocortisone (ANUSOL-HC) 2.5 % rectal cream Place rectally 2 times daily. Yes CHRISTIAN Johnson CNP   Chlorphen-Phenyleph-APAP 2-5-325 MG TABS Take 1 tablet by mouth 4 times daily as needed (congestion and cough) Yes CHRISTIAN Johnson CNP   Blood Pressure KIT Please dispense one BP kit Yes CHRISTIAN Johnson CNP   UNABLE TO FIND Per patient He is not on any OTC medication at this time. Yes Historical Provider, MD       Past Medical History:   Diagnosis Date    AR (allergic rhinitis)     [facility]    Mild intermittent asthma without complication     RENA (obstructive sleep apnea) 2017    uses CPAP    Pure hypercholesterolemia 12/3/2015    Type II or unspecified type diabetes mellitus without mention of complication, not stated as uncontrolled        Social History     Tobacco Use    Smoking status: Former Smoker     Packs/day: 1.00     Years: 10.00     Pack years: 10.00     Types: Cigarettes     Quit date: 3/21/1993     Years since quittin.5    Smokeless tobacco: Never Used   Vaping Use    Vaping Use: Never used   Substance Use Topics    Alcohol use: Yes     Alcohol/week: 1.0 standard drinks     Types: 1 Shots of liquor per week    Drug use: No       Family History   Problem Relation Age of Onset    Diabetes Father     Diabetes Brother     Heart Attack Brother 51       No Known Allergies    OBJECTIVE:  /60   Pulse 73   Ht 5' 8\" (1.727 m)   Wt 194 lb (88 kg)   SpO2 98%   BMI 29.50 kg/m²   GEN:  in NAD weight down 15 pounds in the last year  NECK:  Supple without adenopathy. No bruits  CV:  Regular rate and rhythm, S1 and S2 normal, no murmurs, clicks  PULM:  Chest is clear, no wheezing ,  symmetric air entry throughout both lung fields. EXT: No rash or edema, right knee in a brace  NEURO: nonfocal  a1c - 6.3  ASSESSMENT/PLAN:   1 IMM/health maintenance  Declines flu  covid utd  shingrix # 1 today  Labs at next visit    2. Controlled type 2 diabetes mellitus without complication, without long-term current use of insulin (HCC)  Continue present medication  - POCT glycosylated hemoglobin (Hb A1C)    3. RENA (obstructive sleep apnea)  cpap compliant  Follow-up with Dr. Daja Gamino    4. Pure hypercholesterolemia  Stable continue statin   labs at next visit    5.  Mild intermittent asthma without complication  Stable, continue as needed inhaler use

## 2021-10-29 ENCOUNTER — VIRTUAL VISIT (OUTPATIENT)
Dept: FAMILY MEDICINE CLINIC | Age: 56
End: 2021-10-29
Payer: COMMERCIAL

## 2021-10-29 DIAGNOSIS — J01.90 ACUTE BACTERIAL SINUSITIS: Primary | ICD-10-CM

## 2021-10-29 DIAGNOSIS — B96.89 ACUTE BACTERIAL SINUSITIS: Primary | ICD-10-CM

## 2021-10-29 PROCEDURE — 99213 OFFICE O/P EST LOW 20 MIN: CPT | Performed by: NURSE PRACTITIONER

## 2021-10-29 RX ORDER — AMOXICILLIN AND CLAVULANATE POTASSIUM 875; 125 MG/1; MG/1
1 TABLET, FILM COATED ORAL 2 TIMES DAILY
Qty: 28 TABLET | Refills: 0 | Status: SHIPPED | OUTPATIENT
Start: 2021-10-29 | End: 2021-11-12

## 2021-10-29 RX ORDER — FLUTICASONE PROPIONATE 50 MCG
2 SPRAY, SUSPENSION (ML) NASAL DAILY
Qty: 16 G | Refills: 5 | Status: SHIPPED | OUTPATIENT
Start: 2021-10-29 | End: 2022-01-19 | Stop reason: CLARIF

## 2021-10-29 ASSESSMENT — ENCOUNTER SYMPTOMS
RHINORRHEA: 1
GASTROINTESTINAL NEGATIVE: 1
SHORTNESS OF BREATH: 0
WHEEZING: 0
CHEST TIGHTNESS: 0
SINUS PRESSURE: 1
SINUS PAIN: 1
SORE THROAT: 1
COUGH: 1

## 2021-10-29 NOTE — PROGRESS NOTES
10/29/2021    TELEHEALTH EVALUATION -- Audio/Visual (During GYUKP-90 public health emergency)    Dori Meckel (:  1965) has requested an audio/video evaluation for the following concern(s):    Complains of 8 days of nasal and chest congestion. States he is coughing up a lot of sputum that has a foul taste and smell. Has pain in both sides of face. slight headache. Bilateral ear pain/clogged. Denies fever, shortness of breath, gi symptoms. Review of Systems   Constitutional: Negative for chills and fever. HENT: Positive for congestion, ear pain, rhinorrhea, sinus pressure, sinus pain and sore throat. Negative for ear discharge. Respiratory: Positive for cough. Negative for chest tightness, shortness of breath and wheezing. Cardiovascular: Negative. Gastrointestinal: Negative. Neurological: Positive for headaches. Prior to Visit Medications    Medication Sig Taking?  Authorizing Provider   amoxicillin-clavulanate (AUGMENTIN) 875-125 MG per tablet Take 1 tablet by mouth 2 times daily for 14 days Yes CHRISTIAN Monge CNP   fluticasone (FLONASE) 50 MCG/ACT nasal spray 2 sprays by Each Nostril route daily Yes CHRISTIAN Monge CNP   metFORMIN (GLUCOPHAGE) 500 MG tablet TAKE 1 TABLET BY MOUTH TWO TIMES A DAY WITH MEALS Yes Selina Li MD   lisinopril (PRINIVIL;ZESTRIL) 20 MG tablet TAKE 1 TABLET BY MOUTH EVERY DAY  Yes Selina Li MD   blood glucose test strips (FREESTYLE LITE) strip USE TO TEST BLOOD SUGAR ONCE DAILY AS INSTRUCTED Yes Selina Li MD   FreeStyle Lancets MISC use one time daily Yes Selina Li MD   fexofenadine (ALLEGRA) 180 MG tablet TAKE 1 TABLET BY MOUTH ONE TIME A DAY  Yes Selina Li MD   fluticasone-salmeterol (ADVAIR DISKUS) 250-50 MCG/DOSE AEPB Inhale 1 puff into the lungs every 12 hours Yes Selina Li MD   albuterol sulfate HFA (VENTOLIN HFA) 108 (90 Base) MCG/ACT inhaler Inhale 2 puffs into the lungs every 6 hours as needed for Wheezing Yes Porfirio Counter, MD   simvastatin (ZOCOR) 20 MG tablet TAKE 1 TABLET BY MOUTH NIGHTLY Yes Porfirio MD Tracee   ibuprofen (IBU) 600 MG tablet Take 1 tablet by mouth every 8 hours as needed for Pain Yes CHRISTIAN Angelo CNP   glucose monitoring kit (FREESTYLE) monitoring kit Use to check BS daily Yes CHRISTIAN Angelo CNP   hydrocortisone (ANUSOL-HC) 2.5 % rectal cream Place rectally 2 times daily. Yes CHRISTIAN Angelo - CNP   Chlorphen-Phenyleph-APAP 2-5-325 MG TABS Take 1 tablet by mouth 4 times daily as needed (congestion and cough) Yes CHRISTIAN Angelo CNP   Blood Pressure KIT Please dispense one BP kit Yes CHRISTIAN Angelo CNP   UNABLE TO FIND Per patient He is not on any OTC medication at this time. Yes Historical Provider, MD     Past Medical History:   Diagnosis Date    AR (allergic rhinitis)     [facility] 2014    Mild intermittent asthma without complication     RENA (obstructive sleep apnea) 2017    uses CPAP    Pure hypercholesterolemia 12/3/2015    Type II or unspecified type diabetes mellitus without mention of complication, not stated as uncontrolled      Past Surgical History:   Procedure Laterality Date    CYST INCISION AND DRAINAGE      INGUINAL HERNIA REPAIR Bilateral 14    Laproscopic, with mesh; Meli Neff     Family History   Problem Relation Age of Onset    Diabetes Father     Diabetes Brother     Heart Attack Brother 46     No Known Allergies  Social History     Tobacco Use    Smoking status: Former Smoker     Packs/day: 1.00     Years: 10.00     Pack years: 10.00     Types: Cigarettes     Quit date: 3/21/1993     Years since quittin.6    Smokeless tobacco: Never Used   Vaping Use    Vaping Use: Never used   Substance Use Topics    Alcohol use:  Yes     Alcohol/week: 1.0 standard drinks     Types: 1 Shots of liquor per week    Drug use: No        PHYSICAL EXAMINATION:  Vital Signs: (As obtained by patient/caregiver or practitioner observation)  There were no vitals taken for this visit. Respiratory rate appears normal  Constitutional: Appears well-developed and well-nourished. No apparent distress    Mental status: Alert and awake. Oriented to person/place/eliezer. Able to follow commands    Eyes: EOM normal. Sclera normal. No discharge visible  HENT: Normocephalic, atraumatic. Mouth/Throat: Mucous membranes are moist. External Ears Normal    Neck: No visualized mass   Pulmonary/Chest: Respiratory effort normal.  No visualized signs of difficulty breathing or respiratory distress        Musculoskeletal:  Normal range of motion of neck  Neurological: No Facial Asymmetry (Cranial nerve 7 motor function) (limited exam to video visit). No gaze palsy       Skin:  No significant exanthematous lesions or discoloration noted on facial skin       Psychiatric: Normal Affect. No Hallucinations          ASSESSMENT/PLAN:  1. Acute bacterial sinusitis  Push fluids  Tylenol/Motrin PRN pain/fever/body aches  Rest  - amoxicillin-clavulanate (AUGMENTIN) 875-125 MG per tablet; Take 1 tablet by mouth 2 times daily for 14 days  Dispense: 28 tablet; Refill: 0  - fluticasone (FLONASE) 50 MCG/ACT nasal spray; 2 sprays by Each Nostril route daily  Dispense: 16 g; Refill: 5    Return if symptoms worsen or fail to improve. Ebb Cabot is a 64 y.o. male being evaluated by a Virtual Visit (video visit) encounter to address concerns as mentioned above. A caregiver was present when appropriate. Due to this being a TeleHealth encounter (During VODRD-89 public health emergency), evaluation of the following organ systems was limited: Vitals/Constitutional/EENT/Resp/CV/GI//MS/Neuro/Skin/Heme-Lymph-Imm.   Pursuant to the emergency declaration under the [facility], [address] [place] [address] authority and the [place] [place] [employer] and [employer] [facility] Act, this Virtual Visit was conducted with patient's (and/or legal guardian's) consent, to reduce the patient's risk of exposure to COVID-19 and provide necessary medical care. The patient (and/or legal guardian) has also been advised to contact this office for worsening conditions or problems, and seek emergency medical treatment and/or call 911 if deemed necessary. Patient identification was verified at the start of the visit: Yes    Total time spent on this encounter: 20 minutes    Services were provided through a video synchronous discussion virtually to substitute for in-person clinic visit. Patient and provider were located at their individual homes. --Nolon Mcburney, APRN - CNP on 10/29/2021 at 11:52 AM    An electronic signature was used to authenticate this note. Rakesh Marie

## 2021-12-06 ENCOUNTER — TELEPHONE (OUTPATIENT)
Dept: FAMILY MEDICINE CLINIC | Age: 56
End: 2021-12-06

## 2021-12-06 NOTE — TELEPHONE ENCOUNTER
Received Medical Return to work report from patient  for Porfirio Easley MD to sign. Attached to encounter and given to Dr. Cat Veras.

## 2021-12-10 ENCOUNTER — IMMUNIZATION (OUTPATIENT)
Dept: PRIMARY CARE CLINIC | Age: 56
End: 2021-12-10
Payer: COMMERCIAL

## 2021-12-10 PROCEDURE — 0004A COVID-19, PFIZER VACCINE 30MCG/0.3ML DOSE: CPT | Performed by: FAMILY MEDICINE

## 2021-12-10 PROCEDURE — 91300 COVID-19, PFIZER VACCINE 30MCG/0.3ML DOSE: CPT | Performed by: FAMILY MEDICINE

## 2022-01-19 ENCOUNTER — OFFICE VISIT (OUTPATIENT)
Dept: FAMILY MEDICINE CLINIC | Age: 57
End: 2022-01-19
Payer: COMMERCIAL

## 2022-01-19 VITALS
BODY MASS INDEX: 29.7 KG/M2 | HEIGHT: 68 IN | OXYGEN SATURATION: 99 % | HEART RATE: 61 BPM | SYSTOLIC BLOOD PRESSURE: 138 MMHG | DIASTOLIC BLOOD PRESSURE: 85 MMHG | WEIGHT: 196 LBS

## 2022-01-19 DIAGNOSIS — E78.00 PURE HYPERCHOLESTEROLEMIA: Chronic | ICD-10-CM

## 2022-01-19 DIAGNOSIS — R53.83 FATIGUE, UNSPECIFIED TYPE: ICD-10-CM

## 2022-01-19 DIAGNOSIS — E11.9 CONTROLLED TYPE 2 DIABETES MELLITUS WITHOUT COMPLICATION, WITHOUT LONG-TERM CURRENT USE OF INSULIN (HCC): Primary | ICD-10-CM

## 2022-01-19 DIAGNOSIS — Z12.5 PROSTATE CANCER SCREENING: ICD-10-CM

## 2022-01-19 DIAGNOSIS — J45.20 MILD INTERMITTENT ASTHMA WITHOUT COMPLICATION: Chronic | ICD-10-CM

## 2022-01-19 DIAGNOSIS — Z23 NEED FOR SHINGLES VACCINE: ICD-10-CM

## 2022-01-19 DIAGNOSIS — I10 PRIMARY HYPERTENSION: ICD-10-CM

## 2022-01-19 DIAGNOSIS — G47.33 OSA (OBSTRUCTIVE SLEEP APNEA): ICD-10-CM

## 2022-01-19 LAB
A/G RATIO: 2 (ref 1.1–2.2)
ALBUMIN SERPL-MCNC: 4.3 G/DL (ref 3.4–5)
ALP BLD-CCNC: 71 U/L (ref 40–129)
ALT SERPL-CCNC: 20 U/L (ref 10–40)
ANION GAP SERPL CALCULATED.3IONS-SCNC: 12 MMOL/L (ref 3–16)
AST SERPL-CCNC: 18 U/L (ref 15–37)
BASOPHILS ABSOLUTE: 0 K/UL (ref 0–0.2)
BASOPHILS RELATIVE PERCENT: 0.8 %
BILIRUB SERPL-MCNC: 0.6 MG/DL (ref 0–1)
BUN BLDV-MCNC: 10 MG/DL (ref 7–20)
CALCIUM SERPL-MCNC: 8.9 MG/DL (ref 8.3–10.6)
CHLORIDE BLD-SCNC: 101 MMOL/L (ref 99–110)
CHOLESTEROL, FASTING: 176 MG/DL (ref 0–199)
CO2: 24 MMOL/L (ref 21–32)
CREAT SERPL-MCNC: 0.6 MG/DL (ref 0.9–1.3)
EOSINOPHILS ABSOLUTE: 0.2 K/UL (ref 0–0.6)
EOSINOPHILS RELATIVE PERCENT: 3.5 %
GFR AFRICAN AMERICAN: >60
GFR NON-AFRICAN AMERICAN: >60
GLUCOSE FASTING: 128 MG/DL (ref 70–99)
HBA1C MFR BLD: 7.1 %
HCT VFR BLD CALC: 40.9 % (ref 40.5–52.5)
HDLC SERPL-MCNC: 37 MG/DL (ref 40–60)
HEMOGLOBIN: 13.7 G/DL (ref 13.5–17.5)
LDL CHOLESTEROL CALCULATED: ABNORMAL MG/DL
LDL CHOLESTEROL DIRECT: 89 MG/DL
LYMPHOCYTES ABSOLUTE: 2 K/UL (ref 1–5.1)
LYMPHOCYTES RELATIVE PERCENT: 39 %
MCH RBC QN AUTO: 31.2 PG (ref 26–34)
MCHC RBC AUTO-ENTMCNC: 33.6 G/DL (ref 31–36)
MCV RBC AUTO: 92.9 FL (ref 80–100)
MONOCYTES ABSOLUTE: 0.4 K/UL (ref 0–1.3)
MONOCYTES RELATIVE PERCENT: 7.9 %
NEUTROPHILS ABSOLUTE: 2.5 K/UL (ref 1.7–7.7)
NEUTROPHILS RELATIVE PERCENT: 48.8 %
PDW BLD-RTO: 13.4 % (ref 12.4–15.4)
PLATELET # BLD: 196 K/UL (ref 135–450)
PMV BLD AUTO: 8.8 FL (ref 5–10.5)
POTASSIUM SERPL-SCNC: 3.7 MMOL/L (ref 3.5–5.1)
PROSTATE SPECIFIC ANTIGEN: 0.35 NG/ML (ref 0–4)
RBC # BLD: 4.4 M/UL (ref 4.2–5.9)
SODIUM BLD-SCNC: 137 MMOL/L (ref 136–145)
TOTAL PROTEIN: 6.5 G/DL (ref 6.4–8.2)
TRIGLYCERIDE, FASTING: 347 MG/DL (ref 0–150)
TSH REFLEX: 2.69 UIU/ML (ref 0.27–4.2)
VLDLC SERPL CALC-MCNC: ABNORMAL MG/DL
WBC # BLD: 5.2 K/UL (ref 4–11)

## 2022-01-19 PROCEDURE — 90471 IMMUNIZATION ADMIN: CPT | Performed by: FAMILY MEDICINE

## 2022-01-19 PROCEDURE — 90750 HZV VACC RECOMBINANT IM: CPT | Performed by: FAMILY MEDICINE

## 2022-01-19 PROCEDURE — 99214 OFFICE O/P EST MOD 30 MIN: CPT | Performed by: FAMILY MEDICINE

## 2022-01-19 PROCEDURE — 83036 HEMOGLOBIN GLYCOSYLATED A1C: CPT | Performed by: FAMILY MEDICINE

## 2022-01-19 PROCEDURE — 3051F HG A1C>EQUAL 7.0%<8.0%: CPT | Performed by: FAMILY MEDICINE

## 2022-01-19 NOTE — PROGRESS NOTES
Irwin Gil is a 64 y.o. male. HPI:for  Complex medical visit  Uses cpap, ants to see sleep doctor  Rarely using rescue inhaler  Meds, vitamins and allergies reviewed with pt    ROS: No TIA's or unusual headaches, no dysphagia. No prolonged cough. No dyspnea or chest pain on exertion. No abdominal pain, change in bowel habits, black or bloody stools. No urinary tract symptoms. No new or unusual musculoskeletal symptoms. Prior to Visit Medications    Medication Sig Taking? Authorizing Provider   simvastatin (ZOCOR) 20 MG tablet TAKE 1 TABLET BY MOUTH NIGHTLY Yes Ulises Beckett MD   fluticasone (FLONASE) 50 MCG/ACT nasal spray 2 sprays by Each Nostril route daily Yes CHRISTIAN Scott CNP   metFORMIN (GLUCOPHAGE) 500 MG tablet TAKE 1 TABLET BY MOUTH TWO TIMES A DAY WITH MEALS Yes Ulises Beckett MD   lisinopril (PRINIVIL;ZESTRIL) 20 MG tablet TAKE 1 TABLET BY MOUTH EVERY DAY  Yes Ulises Beckett MD   blood glucose test strips (FREESTYLE LITE) strip USE TO TEST BLOOD SUGAR ONCE DAILY AS INSTRUCTED Yes Ulises Beckett MD   FreeStyle Lancets MISC use one time daily Yes Ulises Beckett MD   fexofenadine (ALLEGRA) 180 MG tablet TAKE 1 TABLET BY MOUTH ONE TIME A DAY  Yes Ulises Beckett MD   fluticasone-salmeterol (ADVAIR DISKUS) 250-50 MCG/DOSE AEPB Inhale 1 puff into the lungs every 12 hours Yes Ulises Beckett MD   albuterol sulfate HFA (VENTOLIN HFA) 108 (90 Base) MCG/ACT inhaler Inhale 2 puffs into the lungs every 6 hours as needed for Wheezing Yes Ulises Beckett MD   ibuprofen (IBU) 600 MG tablet Take 1 tablet by mouth every 8 hours as needed for Pain Yes CHRISTIAN Carrillo CNP   glucose monitoring kit (FREESTYLE) monitoring kit Use to check BS daily Yes CHRISTIAN Carrillo CNP   hydrocortisone (ANUSOL-HC) 2.5 % rectal cream Place rectally 2 times daily.  Yes CHRISTIAN Carrillo CNP   Chlorphen-Phenyleph-APAP 2-5-325 MG TABS Take 1 tablet by mouth 4 times daily as needed (congestion and cough) Yes CHRISTIAN Shahid CNP   Blood Pressure KIT Please dispense one BP kit Yes CHRISTIAN Shahid CNP   UNABLE TO FIND Per patient He is not on any OTC medication at this time. Yes Historical Provider, MD       Past Medical History:   Diagnosis Date    AR (allergic rhinitis)     Diabetes type 2, [facility] ([facility] 2014    Diabetes type 2, controlled ([facility] 2014    Mild intermittent asthma without complication     RENA (obstructive sleep apnea) 2017    uses CPAP    Pure hypercholesterolemia 12/3/2015    Type II or unspecified type diabetes mellitus without mention of complication, not stated as uncontrolled        Social History     Tobacco Use    Smoking status: Former Smoker     Packs/day: 1.00     Years: 10.00     Pack years: 10.00     Types: Cigarettes     Quit date: 3/21/1993     Years since quittin.8    Smokeless tobacco: Never Used   Vaping Use    Vaping Use: Never used   Substance Use Topics    Alcohol use: Yes     Alcohol/week: 1.0 standard drink     Types: 1 Shots of liquor per week    Drug use: No       Family History   Problem Relation Age of Onset    Diabetes Father     Diabetes Brother     Heart Attack Brother 51       No Known Allergies    OBJECTIVE:  BP (!) 163/89   Pulse 61   Ht 5' 8\" (1.727 m)   Wt 196 lb (88.9 kg)   SpO2 99%   BMI 29.80 kg/m²   GEN:  in NAD, wt up 4 #  NECK:  Supple without adenopathy. no bruit, some generalized paraspinal mild tenderness  CV:  Regular rate and rhythm, S1 and S2 normal, no murmurs, clicks  PULM:  Chest is clear, no wheezing ,  symmetric air entry throughout both lung fields. EXT: No rash or edema  NEURO: nonfocal  a1c - 6.4, 6.7, 6.3, 7.1  ASSESSMENT/PLAN:  1.  Controlled type 2 diabetes mellitus without complication, without long-term current use of insulin (HCC)  Sees ophthal  - POCT glycosylated hemoglobin (Hb A1C)  Better job with diet and exercise  See medication  Return to office in 3 months  Foot exam next visit    2. Mild intermittent asthma without complication  stable continue rescue inhaler as needed add Advair if using more frequently    3. RENA (obstructive sleep apnea)  cpap compliant  See sleep md  Further weight loss    4.  Pure hypercholesterolemia  Stable, repeat lipid today    5 htn - high normal  Same meds monitor carefully    6 immunizations up-to-date

## 2022-02-11 ENCOUNTER — NURSE TRIAGE (OUTPATIENT)
Dept: OTHER | Facility: CLINIC | Age: 57
End: 2022-02-11

## 2022-02-11 ENCOUNTER — HOSPITAL ENCOUNTER (OUTPATIENT)
Dept: GENERAL RADIOLOGY | Age: 57
Discharge: HOME | End: 2022-02-11
Payer: COMMERCIAL

## 2022-02-11 ENCOUNTER — OFFICE VISIT (OUTPATIENT)
Dept: FAMILY MEDICINE CLINIC | Age: 57
End: 2022-02-11
Payer: COMMERCIAL

## 2022-02-11 ENCOUNTER — HOSPITAL ENCOUNTER (OUTPATIENT)
Age: 57
Discharge: HOME | End: 2022-02-11
Payer: COMMERCIAL

## 2022-02-11 VITALS
OXYGEN SATURATION: 98 % | SYSTOLIC BLOOD PRESSURE: 150 MMHG | BODY MASS INDEX: 29.1 KG/M2 | HEIGHT: 68 IN | HEART RATE: 64 BPM | DIASTOLIC BLOOD PRESSURE: 82 MMHG | WEIGHT: 192 LBS

## 2022-02-11 DIAGNOSIS — R03.0 ELEVATED BLOOD PRESSURE READING: ICD-10-CM

## 2022-02-11 DIAGNOSIS — S59.902A ELBOW INJURY, LEFT, INITIAL ENCOUNTER: ICD-10-CM

## 2022-02-11 DIAGNOSIS — S59.902A ELBOW INJURY, LEFT, INITIAL ENCOUNTER: Primary | ICD-10-CM

## 2022-02-11 PROCEDURE — 99213 OFFICE O/P EST LOW 20 MIN: CPT | Performed by: STUDENT IN AN ORGANIZED HEALTH CARE EDUCATION/TRAINING PROGRAM

## 2022-02-11 PROCEDURE — 73080 X-RAY EXAM OF ELBOW: CPT

## 2022-02-11 RX ORDER — ACETAMINOPHEN 500 MG
1000 TABLET ORAL EVERY 6 HOURS PRN
Qty: 240 TABLET | Refills: 5 | Status: SHIPPED | OUTPATIENT
Start: 2022-02-11

## 2022-02-11 RX ORDER — IBUPROFEN 600 MG/1
600 TABLET ORAL EVERY 6 HOURS PRN
Qty: 240 TABLET | Refills: 0 | Status: SHIPPED | OUTPATIENT
Start: 2022-02-11 | End: 2022-10-26

## 2022-02-11 ASSESSMENT — PATIENT HEALTH QUESTIONNAIRE - PHQ9
SUM OF ALL RESPONSES TO PHQ QUESTIONS 1-9: 0
2. FEELING DOWN, DEPRESSED OR HOPELESS: 0
SUM OF ALL RESPONSES TO PHQ QUESTIONS 1-9: 0
SUM OF ALL RESPONSES TO PHQ QUESTIONS 1-9: 0
SUM OF ALL RESPONSES TO PHQ9 QUESTIONS 1 & 2: 0
1. LITTLE INTEREST OR PLEASURE IN DOING THINGS: 0
SUM OF ALL RESPONSES TO PHQ QUESTIONS 1-9: 0

## 2022-02-11 NOTE — LETTER
[address]  Phone: [phone]  Fax: [phone]    Raj Rowe MD        February 11, 2022     Patient: Kerry Cid   YOB: 1965   Date of Visit: 2/11/2022       To Whom It May Concern:    I evaluated Mr. Pam Mitchell for an injury to his left elbow today in my office. It is my medical opinion that Pam Mitchell is okay to return to work but with limitations -- he should not be doing repetitive movements, lifting heavy items above 5 lbs, performing movements above his shoulder, or other movements that flare his pain. If you have any questions or concerns, please don't hesitate to call.     Sincerely,        Raj Rowe MD

## 2022-02-11 NOTE — PATIENT INSTRUCTIONS
Tylenol 1000 mg every 6 hours as needed  Ibuprofen 600 mg every 6 hours as needed  Can alternate tylenol and ibuprofen, use ibuprofen sparingly    To get elbow xray --- mercy Tolleson    Xrays  Any [facility] or [address]    Call [address] [place] [address] ([phone]) to schedule

## 2022-02-11 NOTE — TELEPHONE ENCOUNTER
As I am full I would suggest that we order an x-ray he go over have that taken and he can be seen by Dr. Valdovinos Speaks 230 this afternoon if he still wants an appointment.   If the x-ray shows a fracture he will go to Ortho does not show fracture she can come here

## 2022-02-11 NOTE — TELEPHONE ENCOUNTER
Received call from Negar Lima at [facility] with [employer] Complaint. Subjective: Caller states \"fell and hit my left elbow\"     Current Symptoms: left elbow injury from direct fall this morning, some swelling, able to extend and bend arm. Onset: 6 hours ago;    Associated Symptoms: NA    Pain Severity: 5/10; aching; intermittent    Temperature: No     What has been tried: nothing    LMP: NA Pregnant: NA    Recommended disposition: Home care. Pt would still like to set up an appt to see the MD for further evaluation    Care advice provided, patient verbalizes understanding; denies any other questions or concerns; instructed to call back for any new or worsening symptoms. Writer provided warm transfer to [facility] at [facility] for appointment scheduling     Attention Provider: Thank you for allowing me to participate in the care of your patient. The patient was connected to triage in response to information provided to the ECC/PSC. Please do not respond through this encounter as the response is not directed to a shared pool.           Reason for Disposition   Minor elbow injury    Protocols used: ELBOW INJURY-ADULT-OH

## 2022-02-11 NOTE — PROGRESS NOTES
[address] Note    Date: 2/11/2022      Assessment/Plan:     1. Elbow injury, left, initial encounter  -     XR ELBOW LEFT (MIN 3 VIEWS); Future  2. Elevated blood pressure reading    Tylenol 1000 mg every 6 hours as needed  Ibuprofen 600 mg every 6 hours as needed  Can alternate tylenol and ibuprofen, use ibuprofen sparingly  Work note    BP likely from pain, reports does well at home with lisinopril and due to take soon. Follow up if continues to be high    Orders Placed This Encounter   Procedures    XR ELBOW LEFT (MIN 3 VIEWS)     Orders Placed This Encounter   Medications    ibuprofen (ADVIL;MOTRIN) 600 MG tablet     Sig: Take 1 tablet by mouth every 6 hours as needed for Pain     Dispense:  240 tablet     Refill:  0    acetaminophen (TYLENOL) 500 MG tablet     Sig: Take 2 tablets by mouth every 6 hours as needed for Pain     Dispense:  240 tablet     Refill:  5       Return if symptoms worsen or fail to improve. Discussed medication(s) risks, benefits, side effects, adverse reactions and interactions with patient. Patient voiced understanding. 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     BP Readings from Last 3 Encounters:   02/11/22 (!) 150/82   01/19/22 138/85   10/18/21 112/60     The ASCVD Risk score (Tricia Madrid., et al., 2013) failed to calculate for the following reasons:    Unable to determine if patient is Non-     ROS: denies nausea/vomiting, fevers, chills, chest pain, shortness of breath, diarrhea, constipation, blood in the urine or stool         Patient Active Problem List   Diagnosis    Obesity (BMI 30-39. 9)    AR (allergic rhinitis)    Diabetes type 2, controlled ([address]    CMC arthritis, thumb, degenerative    Pure hypercholesterolemia    Mild intermittent asthma without complication    Pyogenic granuloma    Paronychia of finger of right hand    Pain due to onychomycosis of nail    RENA (obstructive sleep apnea)    Cellulitis of finger of right hand    Shift work sleep disorder    Primary hypertension     Past Medical History:   Diagnosis Date    AR (allergic rhinitis)     Diabetes type 2, controlled ([address] 1/13/2014    Diabetes type 2, controlled ([address] 1/13/2014    Mild intermittent asthma without complication 37/7/4880    RENA (obstructive sleep apnea) 06/29/2017    uses CPAP    Pure hypercholesterolemia 12/3/2015    Type II or unspecified type diabetes mellitus without mention of complication, not stated as uncontrolled 1/14       Past Surgical History:   Procedure Laterality Date    CYST INCISION AND DRAINAGE      INGUINAL HERNIA REPAIR Bilateral 1/17/14    Laproscopic, with mesh; Kelly Galindo       Current Outpatient Medications   Medication Sig Dispense Refill    ibuprofen (ADVIL;MOTRIN) 600 MG tablet Take 1 tablet by mouth every 6 hours as needed for Pain 240 tablet 0    acetaminophen (TYLENOL) 500 MG tablet Take 2 tablets by mouth every 6 hours as needed for Pain 240 tablet 5    simvastatin (ZOCOR) 20 MG tablet TAKE 1 TABLET BY MOUTH NIGHTLY 90 tablet 3    metFORMIN (GLUCOPHAGE) 500 MG tablet TAKE 1 TABLET BY MOUTH TWO TIMES A DAY WITH MEALS 180 tablet 2    lisinopril (PRINIVIL;ZESTRIL) 20 MG tablet TAKE 1 TABLET BY MOUTH EVERY DAY  90 tablet 2    blood glucose test strips (FREESTYLE LITE) strip USE TO TEST BLOOD SUGAR ONCE DAILY AS INSTRUCTED 100 each 0    FreeStyle Lancets MISC use one time daily 100 each 0    albuterol sulfate HFA (VENTOLIN HFA) 108 (90 Base) MCG/ACT inhaler Inhale 2 puffs into the lungs every 6 hours as needed for Wheezing 1 Inhaler 5    ibuprofen (IBU) 600 MG tablet Take 1 tablet by mouth every 8 hours as needed for Pain 90 tablet 3    glucose monitoring kit (FREESTYLE) monitoring kit Use to check BS daily 1 kit 0    Chlorphen-Phenyleph-APAP 2-5-325 MG TABS Take 1 tablet by mouth 4 times daily as needed (congestion and cough) 80 tablet 0    Blood Pressure KIT Please dispense one BP kit 1 kit 0    UNABLE TO FIND Per patient He is not on any OTC medication at this time. No current facility-administered medications for this visit. No Known Allergies    Social History     Socioeconomic History    Marital status:      Spouse name: Jv Portillo Number of children: 3    Years of education: None    Highest education level: None   Occupational History    None   Tobacco Use    Smoking status: Former Smoker     Packs/day: 1.00     Years: 10.00     Pack years: 10.00     Types: Cigarettes     Quit date: 3/21/1993     Years since quittin.9    Smokeless tobacco: Never Used   Vaping Use    Vaping Use: Never used   Substance and Sexual Activity    Alcohol use: Yes     Alcohol/week: 1.0 standard drink     Types: 1 Shots of liquor per week    Drug use: No    Sexual activity: None   Other Topics Concern    None   Social History Narrative    None     Social Determinants of Health     Financial Resource Strain: Low Risk     Difficulty of Paying Living Expenses: Not hard at all   Food Insecurity: No Food Insecurity    Worried About Running Out of Food in the Last Year: Never true    Jasson of Food in the Last Year: Never true   Transportation Needs:     Lack of Transportation (Medical): Not on file    Lack of Transportation (Non-Medical):  Not on file Physical Activity:     Days of Exercise per Week: Not on file    Minutes of Exercise per Session: Not on file   Stress:     Feeling of Stress : Not on file   Social Connections:     Frequency of Communication with Friends and Family: Not on file    Frequency of Social Gatherings with Friends and Family: Not on file    Attends Religion Services: Not on file    Active Member of [address] [place] [address] or Organizations: Not on file    Attends Club or Organization Meetings: Not on file    Marital Status: Not on file   Intimate Partner Violence:     Fear of Current or Ex-Partner: Not on file    Emotionally Abused: Not on file    Physically Abused: Not on file    Sexually Abused: Not on file   Housing Stability:     Unable to Pay for Housing in the Last Year: Not on file    Number of [place] in the Last Year: Not on file    Unstable Housing in the Last Year: Not on file     Family History   Problem Relation Age of Onset    Diabetes Father     Diabetes Brother     Heart Attack Brother 51         Vitals:  BP (!) 150/82   Pulse 64   Ht 5' 8\" (1.727 m)   Wt 192 lb (87.1 kg)   SpO2 98%   BMI 29.19 kg/m²     Physical Exam   General:  Well-appearing, no acute distress, alert, non-toxic  HEENT:  Normocephalic, atraumatic, without lymphadenopathy, EOMI, neck supple  Cardiovascular: normal heart rate, normal rhythm, no murmurs, rubs or gallops  Respiratory: normal breath sounds, good air movement, no respiratory distress, no wheezing, rales or rhonchi  GI: bowel sounds normal, soft, non-distended, no tenderness, no masses or peritoneal signs  Extremities: intact distal pulses, warm, dry, well perfused, without clubbing, cyanosis or edema, normal movement of all extremities. No joint swelling, deformity or tenderness.   Skin:  No rash, warm and dry  PSYCH:  alert and oriented x 3; normal affect  NEURO:  CN2-12 grossly intact, normal motor function, normal sensory function, normal speech, no gross focal deficits noted, gait within normal  MSK: full left elbow ROM, AIN/PIN/IO intact, sensation intact, radial pulse 2+, some pain with flexion of elbow over elbow and with flexion of left shoulder over shoulder left pec muscle but 5/5 strength of left shoulder against external and internal resisted motion, pain with palpation over olecranon of left elbow    Roxy Ojeda MD    2/11/2022 12:37 PM    Documentation was done using voice recognition dragon software. Every effort was made to ensure accuracy; however, inadvertent, unintentional computerized transcription errors may be present.

## 2022-02-14 ENCOUNTER — OFFICE VISIT (OUTPATIENT)
Dept: FAMILY MEDICINE CLINIC | Age: 57
End: 2022-02-14
Payer: COMMERCIAL

## 2022-02-14 VITALS
OXYGEN SATURATION: 98 % | HEART RATE: 67 BPM | BODY MASS INDEX: 29.77 KG/M2 | SYSTOLIC BLOOD PRESSURE: 160 MMHG | WEIGHT: 195.8 LBS | DIASTOLIC BLOOD PRESSURE: 83 MMHG

## 2022-02-14 DIAGNOSIS — S50.02XD CONTUSION OF LEFT ELBOW, SUBSEQUENT ENCOUNTER: Primary | ICD-10-CM

## 2022-02-14 PROCEDURE — 99214 OFFICE O/P EST MOD 30 MIN: CPT | Performed by: FAMILY MEDICINE

## 2022-02-14 NOTE — LETTER
[address]  Phone: [phone]  Fax: [phone]    Betty Metz MD        February 14, 2022        Re: Karie Vora  Date of birth 1965     To whom it may concern,     Mr. Shira Thomson was seen for an injury on February 11 when he slipped on the ice outside of his home and injured his left elbow. X-rays revealed no fracture but his elbow continues to be sore. His diagnosis is left elbow contusion. He should remain out of work February 11 12th and 13th. He may return to work February 14 but should be under work restrictions for a week where he does not lift his left arm over his head and does not lift greater than 5 to 10 pounds.   He may resume work without any restrictions 1 week later on February 21, 2022.     Thank you for your consideration in this matter.             Sincerely,            Betty Metz MD

## 2022-02-14 NOTE — PROGRESS NOTES
Kerry Cid is a 64 y.o. male. HPI:  Left elbow still sore from injury when fell on iced at home 2-11-22  Xray neg  Still some pain  Wishes to rtw today with restrctons for another week, needs paper work and new letter    [employer] Readings from Last 3 Encounters:   02/14/22 195 lb 12.8 oz (88.8 kg)   02/11/22 192 lb (87.1 kg)   01/19/22 196 lb (88.9 kg)     Meds, vitamins and allergies reviewed with Patient    ROS:  Gen:no  fever  HEENT: no cold symptoms, sore throat. CV:  Denies chest pain or palpitations. Pulm:  Denies shortness of breath, cough. Abd:  Denies abdominal pain, nausea and vomiting. Skin: no rash    No Known Allergies    Prior to Visit Medications    Medication Sig Taking?  Authorizing Provider   ibuprofen (ADVIL;MOTRIN) 600 MG tablet Take 1 tablet by mouth every 6 hours as needed for Pain Yes Raj Rowe MD   acetaminophen (TYLENOL) 500 MG tablet Take 2 tablets by mouth every 6 hours as needed for Pain Yes Raj Rowe MD   simvastatin (ZOCOR) 20 MG tablet TAKE 1 TABLET BY MOUTH NIGHTLY Yes Maggy Aceves MD   metFORMIN (GLUCOPHAGE) 500 MG tablet TAKE 1 TABLET BY MOUTH TWO TIMES A DAY WITH MEALS Yes Maggy Aceves MD   lisinopril (PRINIVIL;ZESTRIL) 20 MG tablet TAKE 1 TABLET BY MOUTH EVERY DAY  Yes Maggy Aceves MD   blood glucose test strips (FREESTYLE LITE) strip USE TO TEST BLOOD SUGAR ONCE DAILY AS INSTRUCTED Yes Maggy Aceves MD   FreeStyle Lancets MISC use one time daily Yes Maggy Aceves MD   albuterol sulfate HFA (VENTOLIN HFA) 108 (90 Base) MCG/ACT inhaler Inhale 2 puffs into the lungs every 6 hours as needed for Wheezing Yes Maggy Aceves MD   ibuprofen (IBU) 600 MG tablet Take 1 tablet by mouth every 8 hours as needed for Pain Yes CHRISTIAN Cardona CNP   glucose monitoring kit (FREESTYLE) monitoring kit Use to check BS daily Yes Lizz CHRISTIAN Bojorquez CNP   Chlorphen-Phenyleph-APAP 2-5-325 MG TABS Take 1 tablet by mouth 4 times daily as needed (congestion and cough) Yes CHRISTIAN Garcia CNP   Blood Pressure KIT Please dispense one BP kit Yes CHRISTIAN Garcia CNP   UNABLE TO FIND Per patient He is not on any OTC medication at this time. Yes Historical Provider, MD       OBJECTIVE:  BP (!) 160/83   Pulse 67   Wt 195 lb 12.8 oz (88.8 kg)   SpO2 98%   BMI 29.77 kg/m²   GEN:  in NAD  EXT: No rash or edema, left elbow form, sli tender left olecranon tenderness, no swelling or discoloration  NEURO: Alert and oriented ×3    ASSESSMENT/PLAN:  Fall  Left elbow contusion    Work restriction form completed  New letter:    Re: Xavier Lorenzo  Date of birth 1965    To whom it may concern,    Mr. Haylee Herrmann was seen for an injury on February 11 when he slipped on the ice outside of his home and injured his left elbow. X-rays revealed no fracture but his elbow continues to be sore. His diagnosis is left elbow contusion. He should remain out of work February 11 12th and 13th. He may return to work February 14 but should be under work restrictions for a week where he does not lift his left arm over his head and does not lift greater than 5 to 10 pounds. He may resume work without any restrictions 1 week later on February 21, 2022. Thank you for your consideration in this matter.           Sincerely,         Jonah Huff MD

## 2022-02-24 ENCOUNTER — TELEPHONE (OUTPATIENT)
Dept: FAMILY MEDICINE CLINIC | Age: 57
End: 2022-02-24

## 2022-02-24 NOTE — TELEPHONE ENCOUNTER
Called patient he stated the form is to be filled out every 90 days in relation to ongoing condition it is filled out and patient informed he needs to sign it as well

## 2022-03-18 ENCOUNTER — OFFICE VISIT (OUTPATIENT)
Dept: PULMONOLOGY | Age: 57
End: 2022-03-18
Payer: COMMERCIAL

## 2022-03-18 VITALS
BODY MASS INDEX: 29.1 KG/M2 | DIASTOLIC BLOOD PRESSURE: 90 MMHG | WEIGHT: 192 LBS | OXYGEN SATURATION: 98 % | HEIGHT: 68 IN | TEMPERATURE: 97.3 F | SYSTOLIC BLOOD PRESSURE: 150 MMHG | HEART RATE: 72 BPM

## 2022-03-18 DIAGNOSIS — G47.33 OSA (OBSTRUCTIVE SLEEP APNEA): Primary | Chronic | ICD-10-CM

## 2022-03-18 DIAGNOSIS — I10 PRIMARY HYPERTENSION: Chronic | ICD-10-CM

## 2022-03-18 DIAGNOSIS — E11.9 CONTROLLED TYPE 2 DIABETES MELLITUS WITHOUT COMPLICATION, WITHOUT LONG-TERM CURRENT USE OF INSULIN (HCC): Chronic | ICD-10-CM

## 2022-03-18 PROCEDURE — 3051F HG A1C>EQUAL 7.0%<8.0%: CPT | Performed by: INTERNAL MEDICINE

## 2022-03-18 PROCEDURE — 99214 OFFICE O/P EST MOD 30 MIN: CPT | Performed by: INTERNAL MEDICINE

## 2022-03-18 ASSESSMENT — SLEEP AND FATIGUE QUESTIONNAIRES
HOW LIKELY ARE YOU TO NOD OFF OR FALL ASLEEP WHILE WATCHING TV: 3
HOW LIKELY ARE YOU TO NOD OFF OR FALL ASLEEP WHILE SITTING QUIETLY AFTER LUNCH WITHOUT ALCOHOL: 3
HOW LIKELY ARE YOU TO NOD OFF OR FALL ASLEEP WHILE SITTING INACTIVE IN A PUBLIC PLACE: 3
HOW LIKELY ARE YOU TO NOD OFF OR FALL ASLEEP WHILE SITTING AND READING: 3
HOW LIKELY ARE YOU TO NOD OFF OR FALL ASLEEP WHILE LYING DOWN TO REST IN THE AFTERNOON WHEN CIRCUMSTANCES PERMIT: 3
HOW LIKELY ARE YOU TO NOD OFF OR FALL ASLEEP WHILE SITTING AND TALKING TO SOMEONE: 2
HOW LIKELY ARE YOU TO NOD OFF OR FALL ASLEEP WHEN YOU ARE A PASSENGER IN A CAR FOR AN HOUR WITHOUT A BREAK: 3
ESS TOTAL SCORE: 22
HOW LIKELY ARE YOU TO NOD OFF OR FALL ASLEEP IN A CAR, WHILE STOPPED FOR A FEW MINUTES IN TRAFFIC: 2

## 2022-03-18 NOTE — PROGRESS NOTES
complication, without long-term current use of insulin ([facility])  Assessment & Plan:  Chronic- Stable. Discussed the importance of treating sleep apnea as part of the management of this disorder. Cont any meds per PCP and other physicians. Reviewed, analyzed, and documented physiologic data from patient's PAP machine. This information was analyzed to assess complexity and medical decision making in regards to further testing and management. The primary encounter diagnosis was RENA (obstructive sleep apnea). Diagnoses of Primary hypertension and Controlled type 2 diabetes mellitus without complication, without long-term current use of insulin ([facility] were also pertinent to this visit. The chronic medical conditions listed are directly related to the primary diagnosis listed above. The management of the primary diagnosis affects the secondary diagnosis and vice versa. Subjective:   Subjective   Patient ID: Marilia Sotelo is a 64 y.o. male. Chief Complaint   Patient presents with    Sleep Apnea       HPI:  Machine Modem/Download Info:  Compliance (hours/night): 4 hrs/night  % of nights >= 4 hrs: 45.6 %  Download AHI (/hour): 4.4 /HR     Average IPAP Pressure: 12.1 cmH2O  Average EPAP Pressure: 10 cmH2O         AUTO BIPAP - Settings (Meghan)  IPAP Max: 25 cmH2O  EPAP Min: 9 cmH2O  Pressure Support Min: 2  Pressure Support Max: 4             Comfort Settings  Humidity Level (0-8): 3  Flex/EPR (0-3): 3       Struggling with his machine, his wife is complaining of snoring while he is on his machine. He is often not waking up rested. Download shows compliant use of his machine and AHI less than 5 but he still feeling worse than he did when he first got the machine.     [address]    [place] - Total score: 22    Social History     Socioeconomic History    Marital status:      Spouse name: Katy Correa Number of children: 3    Years of education: Not on file    Highest education level: Not on file   Occupational History    Not on file   Tobacco Use    Smoking status: Former Smoker     Packs/day: 1.00     Years: 10.00     Pack years: 10.00     Types: Cigarettes     Quit date: 3/21/1993     Years since quittin.0    Smokeless tobacco: Never Used   Vaping Use    Vaping Use: Never used   Substance and Sexual Activity    Alcohol use: Yes     Alcohol/week: 1.0 standard drink     Types: 1 Shots of liquor per week    Drug use: No    Sexual activity: Not on file   Other Topics Concern    Not on file   Social History Narrative    Not on file     Social Determinants of Health     Financial Resource Strain: Low Risk     Difficulty of Paying Living Expenses: Not hard at all   Food Insecurity: No Food Insecurity    Worried About [address] in the Last Year: Never true    [address] in the Last Year: Never true   Transportation Needs:     Lack of Transportation (Medical): Not on file    Lack of Transportation (Non-Medical):  Not on file   Physical Activity:     Days of Exercise per Week: Not on file    Minutes of Exercise per Session: Not on file   Stress:     Feeling of Stress : Not on file   Social Connections:     Frequency of Communication with Friends and Family: Not on file    Frequency of Social Gatherings with Friends and Family: Not on file    Attends Mor[employer] Services: Not on file    Active Member of [address] [place] [address] or Organizations: Not on file    Attends Club or Organization Meetings: Not on file    Marital Status: Not on file   Intimate Partner Violence:     Fear of Current or Ex-Partner: Not on file    Emotionally Abused: Not on file    Physically Abused: Not on file    Sexually Abused: Not on file   Housing Stability:     Unable to Pay for Housing in the Last Year: Not on file    Number of [place] in the Last Year: Not on file    Unstable Housing in the Last Year: Not on file       Current Outpatient Medications   Medication Instructions    acetaminophen (TYLENOL) 1,000 mg, Oral, EVERY 6 HOURS PRN    albuterol sulfate HFA (VENTOLIN HFA) 108 (90 Base) MCG/ACT inhaler 2 puffs, Inhalation, EVERY 6 HOURS PRN    blood glucose test strips (FREESTYLE LITE) strip USE TO TEST BLOOD SUGAR ONCE DAILY AS INSTRUCTED    Blood Pressure KIT Please dispense one BP kit    Chlorphen-Phenyleph-APAP 2-5-325 MG TABS 1 tablet, Oral, 4 TIMES DAILY PRN    FreeStyle Lancets MISC use one time daily    glucose monitoring kit (FREESTYLE) monitoring kit Use to check BS daily    ibuprofen (ADVIL;MOTRIN) 600 mg, Oral, EVERY 6 HOURS PRN    ibuprofen (IBU) 600 mg, Oral, EVERY 8 HOURS PRN    lisinopril (PRINIVIL;ZESTRIL) 20 MG tablet TAKE 1 TABLET BY MOUTH EVERY DAY     metFORMIN (GLUCOPHAGE) 500 MG tablet TAKE 1 TABLET BY MOUTH TWO TIMES A DAY WITH MEALS    simvastatin (ZOCOR) 20 mg, Oral, NIGHTLY    UNABLE TO FIND Per patient He is not on any OTC medication at this time. Vitals:  Weight BMI   Wt Readings from Last 3 Encounters:   03/18/22 192 lb (87.1 kg)   02/14/22 195 lb 12.8 oz (88.8 kg)   02/11/22 192 lb (87.1 kg)    Body mass index is 29.19 kg/m².      BP HR SaO2   BP Readings from Last 3 Encounters:   03/18/22 (!) 150/90   02/14/22 (!) 160/83   02/11/22 (!) 150/82    Pulse Readings from Last 3 Encounters:   03/18/22 72   02/14/22 67   02/11/22 64    SpO2 Readings from Last 3 Encounters:   03/18/22 98%   02/14/22 98%   02/11/22 98%        Electronically signed by Jennie Vega MD on 3/18/2022 at 12:15 PM

## 2022-03-18 NOTE — PROGRESS NOTES
John Arce         : 1965    Diagnosis: [x] RENA (G47.33) [] CSA (G47.31) [] Apnea (G47.30)   Length of Need: [x] 13 Months [] 99 Months [] Other:    Machine (ALFRED!): [] Respironics Dream Station      Auto [] ResMed AirSense     Auto [] Other:     []  CPAP () [] Bilevel ()   Mode: [] Auto [] Spontaneous    Mode: [] Auto [] Spontaneous              Comfort Settings:        Humidifier: [] Heated ()        [x] Water chamber replacement ()/ 1 per 6 months        Mask:   [] Nasal () /1 per 3 months [x] Full Face () /1 per 3 months   [] Patient choice -Size and fit mask [x] Patient Choice - Size and fit mask   [] Dispense:  [] Dispense:    [] Headgear () / 1 per 3 months [x] Headgear () / 1 per 3 months   [] Replacement Nasal Cushion ()/2 per month [x] Interface Replacement ()/1 per month   [] Replacement Nasal Pillows ()/2 per month         Tubing: [x] Heated ()/1 per 3 months    [] Standard ()/1 per 3 months [] Other:           Filters: [x] Non-disposable ()/1 per 6 months     [x] Ultra-Fine, Disposable ()/2 per month        Miscellaneous: [] Chin Strap ()/ 1 per 6 months [] O2 bleed-in:       LPM   [] Oximetry on CPAP/Bilevel []  Other:    [x] Modem: ()         Start Order Date: 22    MEDICAL JUSTIFICATION:  I, the undersigned, certify that the above prescribed supplies are medically necessary for this patients wellbeing. In my opinion, the supplies are both reasonable and necessary in reference to accepted standards of medicalpractice in treatment of this patients condition.     Joaquim Washington MD      NPI: [id number]       Order Signed Date: 22    Electronically signed by Joaquim Washington MD on 3/18/2022 at 11:23 AM    John Arce  1965  [address] [place] [facility] [address]  [phone] (home)   [phone] (mobile)      Insurance Info (confirm with patient if correct):  Payor/Plan Subscr  Sex Relation Sub. Ins. ID Effective Group Num   1. 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  Yecenia Ellis 1965 Male  [id number] Not Eff CSOHIO                                   [address]

## 2022-03-18 NOTE — LETTER
[facility]  [address] [place] [address]  Phone: [phone]  Fax: [phone]    Jose Martin Troy MD    March 18, 2022     Kishore Mota MD  [address] [facility] [address]    Patient: Mendoza Vasquez   MR Number: [id number]   YOB: 1965   Date of Visit: 3/18/2022       Dear Kishore Mota: Thank you for referring Zeny Green to me for evaluation/treatment. Below are the relevant portions of my assessment and plan of care. 1. RENA (obstructive sleep apnea)  Assessment & Plan:  Chronic-progressing: Reviewed and analyzed results of physiologic download from patient's machine and reviewed with patient. Supplies and parts as needed for his machine. These are medically necessary. Limit caffeine use after 3pm. Based on the analyzed data patient needs in lab titration to assess why he is still snoring and tired despite compliant use on his machine and AHI less than 5 for over 30 days    Discussed with patient today the recent recall issued by [employer] for their Job[employer] platform Pap machines. Reviewed that it affected a very small number of machines (0.03%) and seems to be exacerbated by using ozone disinfection or machine being exposed to a very high heat/humidity environment. Recommended the patient immediately discontinue use of any external ozone  if being used. Discussed the risk of untreated sleep apnea (including but not limited to early morbidity mortality, motor vehicle accidents, and cardiovascular issues, etc.) versus the very small risk of foam degradation with use of the machine. Possible alternative may be to switch manufacturers for their machine but will need to see if their insurance company will allow that. Orders:  -     Sleep Study with PAP Titration; Future  2. Primary hypertension  Assessment & Plan:  Chronic- Stable.   Discussed the importance of treating sleep apnea as part of the management of this disorder. Cont any meds per PCP and other physicians. 3. Controlled type 2 diabetes mellitus without complication, without long-term current use of insulin ([facility])  Assessment & Plan:  Chronic- Stable. Discussed the importance of treating sleep apnea as part of the management of this disorder. Cont any meds per PCP and other physicians. Reviewed, analyzed, and documented physiologic data from patient's PAP machine. This information was analyzed to assess complexity and medical decision making in regards to further testing and management. The primary encounter diagnosis was RENA (obstructive sleep apnea). Diagnoses of Primary hypertension and Controlled type 2 diabetes mellitus without complication, without long-term current use of insulin ([facility] were also pertinent to this visit. The chronic medical conditions listed are directly related to the primary diagnosis listed above. The management of the primary diagnosis affects the secondary diagnosis and vice versa. If you have questions, please do not hesitate to call me. I look forward to following Tosin Ochoa along with you.     Sincerely,      Elijah Bush MD

## 2022-03-18 NOTE — ASSESSMENT & PLAN NOTE
Chronic-progressing: Reviewed and analyzed results of physiologic download from patient's machine and reviewed with patient. Supplies and parts as needed for his machine. These are medically necessary. Limit caffeine use after 3pm. Based on the analyzed data patient needs in lab titration to assess why he is still snoring and tired despite compliant use on his machine and AHI less than 5 for over 30 days    Discussed with patient today the recent recall issued by [employer] for their Contraqer platform Pap machines. Reviewed that it affected a very small number of machines (0.03%) and seems to be exacerbated by using ozone disinfection or machine being exposed to a very high heat/humidity environment. Recommended the patient immediately discontinue use of any external ozone  if being used. Discussed the risk of untreated sleep apnea (including but not limited to early morbidity mortality, motor vehicle accidents, and cardiovascular issues, etc.) versus the very small risk of foam degradation with use of the machine. Possible alternative may be to switch manufacturers for their machine but will need to see if their insurance company will allow that.

## 2022-04-20 ENCOUNTER — OFFICE VISIT (OUTPATIENT)
Dept: FAMILY MEDICINE CLINIC | Age: 57
End: 2022-04-20
Payer: COMMERCIAL

## 2022-04-20 VITALS
OXYGEN SATURATION: 98 % | WEIGHT: 195 LBS | SYSTOLIC BLOOD PRESSURE: 142 MMHG | HEART RATE: 54 BPM | HEIGHT: 68 IN | DIASTOLIC BLOOD PRESSURE: 80 MMHG | BODY MASS INDEX: 29.55 KG/M2

## 2022-04-20 DIAGNOSIS — E11.9 CONTROLLED TYPE 2 DIABETES MELLITUS WITHOUT COMPLICATION, WITHOUT LONG-TERM CURRENT USE OF INSULIN (HCC): Primary | ICD-10-CM

## 2022-04-20 DIAGNOSIS — G47.33 OSA (OBSTRUCTIVE SLEEP APNEA): Chronic | ICD-10-CM

## 2022-04-20 DIAGNOSIS — I10 PRIMARY HYPERTENSION: Chronic | ICD-10-CM

## 2022-04-20 LAB — HBA1C MFR BLD: 6.7 %

## 2022-04-20 PROCEDURE — 3044F HG A1C LEVEL LT 7.0%: CPT | Performed by: FAMILY MEDICINE

## 2022-04-20 PROCEDURE — 82044 UR ALBUMIN SEMIQUANTITATIVE: CPT | Performed by: FAMILY MEDICINE

## 2022-04-20 PROCEDURE — 99214 OFFICE O/P EST MOD 30 MIN: CPT | Performed by: FAMILY MEDICINE

## 2022-04-20 PROCEDURE — 83036 HEMOGLOBIN GLYCOSYLATED A1C: CPT | Performed by: FAMILY MEDICINE

## 2022-04-20 RX ORDER — LISINOPRIL 40 MG/1
40 TABLET ORAL DAILY
Qty: 90 TABLET | Refills: 3 | Status: SHIPPED | OUTPATIENT
Start: 2022-04-20 | End: 2022-10-26 | Stop reason: SDUPTHER

## 2022-04-20 RX ORDER — ALBUTEROL SULFATE 90 UG/1
2 AEROSOL, METERED RESPIRATORY (INHALATION) EVERY 6 HOURS PRN
Qty: 18 G | Refills: 3 | Status: SHIPPED | OUTPATIENT
Start: 2022-04-20

## 2022-04-20 RX ORDER — FLUTICASONE PROPIONATE 50 MCG
2 SPRAY, SUSPENSION (ML) NASAL DAILY
Qty: 16 G | Refills: 5 | Status: SHIPPED | OUTPATIENT
Start: 2022-04-20 | End: 2022-10-26 | Stop reason: SDUPTHER

## 2022-04-20 RX ORDER — FEXOFENADINE HCL 180 MG/1
180 TABLET ORAL DAILY
Qty: 30 TABLET | Refills: 2 | Status: SHIPPED | OUTPATIENT
Start: 2022-04-20 | End: 2022-09-02

## 2022-04-20 NOTE — PROGRESS NOTES
Christiano Riggins is a 64 y.o. male. HPI:here for complex medical visit  Allergies getting worse  Working night shift 8 hr/ day  pain base of thumbs left worse than right  Difficulty sleeping, sleep apnea and shiftwork and he is seeing a sleep doctor soon to repeat a sleep study  Sleep deprivation he gets a headache and misses work  He will need FMLA forms filled out for work    Meds, vitamins and allergies reviewed with pt    ROS: No TIA's or unusual headaches, no dysphagia. No prolonged cough. No dyspnea or chest pain on exertion. No abdominal pain, change in bowel habits, black or bloody stools. No urinary tract symptoms. No new or unusual musculoskeletal symptoms. Prior to Visit Medications    Medication Sig Taking?  Authorizing Provider   ibuprofen (ADVIL;MOTRIN) 600 MG tablet Take 1 tablet by mouth every 6 hours as needed for Pain Yes Minerva Kitchen MD   acetaminophen (TYLENOL) 500 MG tablet Take 2 tablets by mouth every 6 hours as needed for Pain Yes Minerva Kitchen MD   simvastatin (ZOCOR) 20 MG tablet TAKE 1 TABLET BY MOUTH NIGHTLY Yes Tia Santana MD   metFORMIN (GLUCOPHAGE) 500 MG tablet TAKE 1 TABLET BY MOUTH TWO TIMES A DAY WITH MEALS Yes Tia Santana MD   lisinopril (PRINIVIL;ZESTRIL) 20 MG tablet TAKE 1 TABLET BY MOUTH EVERY DAY  Yes Tia Santnaa MD   blood glucose test strips (FREESTYLE LITE) strip USE TO TEST BLOOD SUGAR ONCE DAILY AS INSTRUCTED Yes Tia Santana MD   FreeStyle Lancets MISC use one time daily Yes Tia Santana MD   albuterol sulfate HFA (VENTOLIN HFA) 108 (90 Base) MCG/ACT inhaler Inhale 2 puffs into the lungs every 6 hours as needed for Wheezing Yes Tia Santana MD   ibuprofen (IBU) 600 MG tablet Take 1 tablet by mouth every 8 hours as needed for Pain Yes CHRISTIAN Overton CNP   glucose monitoring kit (FREESTYLE) monitoring kit Use to check BS daily Yes CHRISTIAN Overton CNP   Chlorphen-Phenyleph-APAP 2-5-325 MG TABS Take 1 tablet by mouth 4 times daily as needed (congestion and cough) Yes CHRISTIAN Barboza CNP   Blood Pressure KIT Please dispense one BP kit Yes CHRISTIAN Barboza CNP   UNABLE TO FIND Per patient He is not on any OTC medication at this time. Yes Historical Provider, MD       Past Medical History:   Diagnosis Date    AR (allergic rhinitis)     Diabetes type 2, controlled ([address] 2014    Diabetes type 2, controlled ([address] 2014    Diabetes type 2, controlled ([address] 2014    Mild intermittent asthma without complication     RENA (obstructive sleep apnea) 2017    uses CPAP    Primary hypertension 2022    Pure hypercholesterolemia 12/3/2015    Type II or unspecified type diabetes mellitus without mention of complication, not stated as uncontrolled        Social History     Tobacco Use    Smoking status: Former Smoker     Packs/day: 1.00     Years: 10.00     Pack years: 10.00     Types: Cigarettes     Quit date: 3/21/1993     Years since quittin.1    Smokeless tobacco: Never Used   Vaping Use    Vaping Use: Never used   Substance Use Topics    Alcohol use: Yes     Alcohol/week: 1.0 standard drink     Types: 1 Shots of liquor per week    Drug use: No       Family History   Problem Relation Age of Onset    Diabetes Father     Diabetes Brother     Heart Attack Brother 51       No Known Allergies    OBJECTIVE:  BP (!) 143/87   Pulse 54   Ht 5' 8\" (1.727 m)   Wt 195 lb (88.5 kg)   SpO2 98%   BMI 29.65 kg/m²   GEN:  in NAD  NECK:  Supple without adenopathy. no bruit  CV:  Regular rate and rhythm, S1 and S2 normal, no murmurs, clicks  PULM:  Chest is clear, no wheezing ,  symmetric air entry throughout both lung fields. EXT: No rash or edema  NEURO: nonfocal  Lab Results   Component Value Date    LABA1C 6.7 2022     Lab Results   Component Value Date    .5 2019     ASSESSMENT/PLAN:  1.  Controlled type 2 diabetes mellitus without complication, without long-term current use of insulin (HCC)  stable, continue present medication  - POCT glycosylated hemoglobin (Hb A1C)  - POCT microalbumin  -  DIABETES FOOT EXAM    2. RENA (obstructive sleep apnea)  cpap compliant  Repeating sleep study in next week or two  Migraines triggered by sleep deprivation, will need [place] paperwork completed    3.  Primary hypertension - not at goal  Inc lisinopril  Form 20 to 40  Return to office in 3 months for follow-up    4 hand OA  Diclofenac gel  Hand brace    5 imm  - anae covid booster at [facility]

## 2022-04-23 ENCOUNTER — HOSPITAL ENCOUNTER (OUTPATIENT)
Dept: SLEEP CENTER | Age: 57
Discharge: HOME | End: 2022-04-23
Payer: COMMERCIAL

## 2022-04-23 DIAGNOSIS — G47.33 OSA (OBSTRUCTIVE SLEEP APNEA): Chronic | ICD-10-CM

## 2022-04-23 PROCEDURE — 95811 POLYSOM 6/>YRS CPAP 4/> PARM: CPT

## 2022-04-26 PROCEDURE — 95811 POLYSOM 6/>YRS CPAP 4/> PARM: CPT | Performed by: INTERNAL MEDICINE

## 2022-04-28 ENCOUNTER — TELEPHONE (OUTPATIENT)
Dept: PULMONOLOGY | Age: 57
End: 2022-04-28

## 2022-04-28 NOTE — TELEPHONE ENCOUNTER
Spoke with to to review titration study. Change made via modem following test results. F/U scheduled.

## 2022-06-02 DIAGNOSIS — E11.9 DIABETES TYPE 2, CONTROLLED (HCC): ICD-10-CM

## 2022-06-02 RX ORDER — LISINOPRIL 20 MG/1
TABLET ORAL
Qty: 90 TABLET | Refills: 3 | Status: SHIPPED | OUTPATIENT
Start: 2022-06-02 | End: 2022-10-26

## 2022-06-02 NOTE — TELEPHONE ENCOUNTER
Medication:   Requested Prescriptions     Pending Prescriptions Disp Refills    metFORMIN (GLUCOPHAGE) 500 MG tablet [Pharmacy Med Name: metFORMIN HCl Oral Tablet 500 MG] 180 tablet 0     Sig: TAKE 1 TABLET BY MOUTH TWO TIMES A DAY WITH MEALS    lisinopril (PRINIVIL;ZESTRIL) 20 MG tablet [Pharmacy Med Name: Lisinopril Oral Tablet 20 MG] 90 tablet 0     Sig: TAKE 1 TABLET BY MOUTH EVERY DAY       Last Filled:  9/2/2021, 180, 2  9/2/2021, 90, 2    Patient Phone Number: [phone] (home)     Last appt: 4/20/2022   Next appt: 10/26/2022    Lab Results   Component Value Date     01/19/2022    K 3.7 01/19/2022     01/19/2022    CO2 24 01/19/2022    BUN 10 01/19/2022    CREATININE 0.6 (L) 01/19/2022    GLUCOSE 167 (H) 10/07/2020    CALCIUM 8.9 01/19/2022    PROT 6.5 01/19/2022    LABALBU 4.3 01/19/2022    BILITOT 0.6 01/19/2022    ALKPHOS 71 01/19/2022    AST 18 01/19/2022    ALT 20 01/19/2022    LABGLOM >60 01/19/2022    GFRAA >60 01/19/2022    AGRATIO 2.0 01/19/2022    GLOB 2.2 11/25/2019     Last Labs DM:   Lab Results   Component Value Date    LABA1C 6.7 04/20/2022

## 2022-08-17 ENCOUNTER — OFFICE VISIT (OUTPATIENT)
Dept: FAMILY MEDICINE CLINIC | Age: 57
End: 2022-08-17
Payer: COMMERCIAL

## 2022-08-17 VITALS
BODY MASS INDEX: 29.44 KG/M2 | DIASTOLIC BLOOD PRESSURE: 88 MMHG | SYSTOLIC BLOOD PRESSURE: 136 MMHG | OXYGEN SATURATION: 98 % | HEART RATE: 75 BPM | WEIGHT: 193.6 LBS

## 2022-08-17 DIAGNOSIS — K64.9 HEMORRHOIDS, UNSPECIFIED HEMORRHOID TYPE: Primary | ICD-10-CM

## 2022-08-17 PROCEDURE — 99213 OFFICE O/P EST LOW 20 MIN: CPT | Performed by: NURSE PRACTITIONER

## 2022-08-17 RX ORDER — HYDROCORTISONE 25 MG/G
CREAM TOPICAL
Qty: 28 G | Refills: 1 | Status: SHIPPED | OUTPATIENT
Start: 2022-08-17

## 2022-08-17 SDOH — ECONOMIC STABILITY: FOOD INSECURITY: WITHIN THE PAST 12 MONTHS, THE FOOD YOU BOUGHT JUST DIDN'T LAST AND YOU DIDN'T HAVE MONEY TO GET MORE.: NEVER TRUE

## 2022-08-17 SDOH — ECONOMIC STABILITY: FOOD INSECURITY: WITHIN THE PAST 12 MONTHS, YOU WORRIED THAT YOUR FOOD WOULD RUN OUT BEFORE YOU GOT MONEY TO BUY MORE.: NEVER TRUE

## 2022-08-17 ASSESSMENT — PATIENT HEALTH QUESTIONNAIRE - PHQ9
SUM OF ALL RESPONSES TO PHQ9 QUESTIONS 1 & 2: 0
1. LITTLE INTEREST OR PLEASURE IN DOING THINGS: 0
SUM OF ALL RESPONSES TO PHQ QUESTIONS 1-9: 0
2. FEELING DOWN, DEPRESSED OR HOPELESS: 0

## 2022-08-17 ASSESSMENT — ENCOUNTER SYMPTOMS
DIARRHEA: 0
CONSTIPATION: 0
BACK PAIN: 0
ABDOMINAL PAIN: 0
VOMITING: 0
NAUSEA: 0

## 2022-08-17 ASSESSMENT — SOCIAL DETERMINANTS OF HEALTH (SDOH): HOW HARD IS IT FOR YOU TO PAY FOR THE VERY BASICS LIKE FOOD, HOUSING, MEDICAL CARE, AND HEATING?: NOT HARD AT ALL

## 2022-08-17 NOTE — PROGRESS NOTES
SUBJECTIVE:  Pt is a of 64 y.o. male comes in today with   Chief Complaint   Patient presents with    Hemorrhoids       Patient presenting today for evaluation of hemorrhoids. First episode approx 3-4 yrs ago. Most recent flare up started 3 weeks ago. Anabelle Doleonidasy asleep on toilet for several hours. Felt hemorrhoid the following day. Some bleeding, no pain. Bleeding is decreasing. C/o itching. Denies constipation or diarrhea. Proctacid ointment. Prior to Visit Medications    Medication Sig Taking?  Authorizing Provider   metFORMIN (GLUCOPHAGE) 500 MG tablet TAKE 1 TABLET BY MOUTH TWO TIMES A DAY WITH MEALS Yes Ava Beltran MD   lisinopril (PRINIVIL;ZESTRIL) 20 MG tablet TAKE 1 TABLET BY MOUTH EVERY DAY Yes Ava Beltran MD   lisinopril (PRINIVIL;ZESTRIL) 40 MG tablet Take 1 tablet by mouth daily Yes Ava Beltran MD   fluticasone (FLONASE) 50 MCG/ACT nasal spray 2 sprays by Each Nostril route daily Yes Ava Beltran MD   albuterol sulfate  (90 Base) MCG/ACT inhaler Inhale 2 puffs into the lungs every 6 hours as needed for Wheezing Yes Ava Beltran MD   diclofenac sodium (VOLTAREN) 1 % GEL Apply 2 g topically 4 times daily Yes Ava Beltran MD   ibuprofen (ADVIL;MOTRIN) 600 MG tablet Take 1 tablet by mouth every 6 hours as needed for Pain Yes Deondre Bryant MD   acetaminophen (TYLENOL) 500 MG tablet Take 2 tablets by mouth every 6 hours as needed for Pain Yes Deondre Bryant MD   simvastatin (ZOCOR) 20 MG tablet TAKE 1 TABLET BY MOUTH NIGHTLY Yes Ava Beltran MD   blood glucose test strips (FREESTYLE LITE) strip USE TO TEST BLOOD SUGAR ONCE DAILY AS INSTRUCTED Yes Ava Beltran MD   FreeStyle Lancets MISC use one time daily Yes Ava Beltran MD   albuterol sulfate HFA (VENTOLIN HFA) 108 (90 Base) MCG/ACT inhaler Inhale 2 puffs into the lungs every 6 hours as needed for Wheezing Yes Ava Beltran MD   ibuprofen (IBU) 600 MG tablet Take 1 tablet by mouth every 8 hours as needed for Pain Yes CHRISTIAN Ray CNP   glucose monitoring kit (FREESTYLE) monitoring kit Use to check BS daily Yes CHRISTIAN Ray CNP   Chlorphen-Phenyleph-APAP 2-5-325 MG TABS Take 1 tablet by mouth 4 times daily as needed (congestion and cough) Yes CHRISTIAN Ray CNP   Blood Pressure KIT Please dispense one BP kit Yes CHRISTIAN Ray CNP   UNABLE TO FIND Per patient He is not on any OTC medication at this time. Yes Historical Provider, MD         Patient's allergies, past medical, surgical, social and family histories werereviewed and updated as appropriate. Review of Systems   Constitutional:  Negative for chills and fever. Gastrointestinal:  Negative for abdominal pain, constipation, diarrhea, nausea and vomiting. Hemorrhoid     Musculoskeletal:  Negative for back pain. Physical Exam  Vitals reviewed. Constitutional:       Appearance: He is well-developed. HENT:      Head: Normocephalic and atraumatic. Genitourinary:     Rectum: External hemorrhoid (no bleeding) present. Skin:     General: Skin is warm and dry. Neurological:      Mental Status: He is alert and oriented to person, place, and time. Psychiatric:         Mood and Affect: Mood normal.         Behavior: Behavior normal.         Thought Content: Thought content normal.         Judgment: Judgment normal.     Vitals:    08/17/22 1525   BP: 136/88   Pulse: 75   SpO2: 98%   Weight: 193 lb 9.6 oz (87.8 kg)             ASSESSMENT:  1. Hemorrhoids, unspecified hemorrhoid type        PLAN:  1. Hemorrhoids, unspecified hemorrhoid type  -     hydrocortisone (ANUSOL-HC) 2.5 % CREA rectal cream; Apply to external hemorrhoid twice a day for 2 weeks. Limit time on toilet  Can use OTC Tucks pads and warm sitz baths  See pt instructions  F/u 10-14 days if no improvement, sooner if symptomsworsen. Discussed use, benefit, and side effects of prescribed medications.  All patient questions answered. Pt voiced understanding.

## 2022-08-17 NOTE — PATIENT INSTRUCTIONS
Apply some cream to TUCKs pad, fold in half and place between buttocks. Apply cream to rectum in am and at bedtime.

## 2022-08-29 ENCOUNTER — OFFICE VISIT (OUTPATIENT)
Dept: PULMONOLOGY | Age: 57
End: 2022-08-29
Payer: COMMERCIAL

## 2022-08-29 VITALS
HEART RATE: 62 BPM | WEIGHT: 195 LBS | OXYGEN SATURATION: 96 % | HEIGHT: 68 IN | BODY MASS INDEX: 29.55 KG/M2 | DIASTOLIC BLOOD PRESSURE: 70 MMHG | SYSTOLIC BLOOD PRESSURE: 140 MMHG

## 2022-08-29 DIAGNOSIS — G47.33 OSA (OBSTRUCTIVE SLEEP APNEA): Chronic | ICD-10-CM

## 2022-08-29 DIAGNOSIS — E11.9 CONTROLLED TYPE 2 DIABETES MELLITUS WITHOUT COMPLICATION, WITHOUT LONG-TERM CURRENT USE OF INSULIN (HCC): Chronic | ICD-10-CM

## 2022-08-29 DIAGNOSIS — I10 PRIMARY HYPERTENSION: Chronic | ICD-10-CM

## 2022-08-29 PROCEDURE — 3044F HG A1C LEVEL LT 7.0%: CPT | Performed by: INTERNAL MEDICINE

## 2022-08-29 PROCEDURE — 99214 OFFICE O/P EST MOD 30 MIN: CPT | Performed by: INTERNAL MEDICINE

## 2022-08-29 RX ORDER — LISINOPRIL 20 MG/1
20 TABLET ORAL DAILY
COMMUNITY
End: 2022-10-26

## 2022-08-29 RX ORDER — SIMVASTATIN 20 MG
20 TABLET ORAL NIGHTLY
COMMUNITY
End: 2022-10-26

## 2022-08-29 ASSESSMENT — SLEEP AND FATIGUE QUESTIONNAIRES
ESS TOTAL SCORE: 14
HOW LIKELY ARE YOU TO NOD OFF OR FALL ASLEEP WHILE SITTING AND TALKING TO SOMEONE: 1
HOW LIKELY ARE YOU TO NOD OFF OR FALL ASLEEP IN A CAR, WHILE STOPPED FOR A FEW MINUTES IN TRAFFIC: 1
HOW LIKELY ARE YOU TO NOD OFF OR FALL ASLEEP WHILE WATCHING TV: 2
HOW LIKELY ARE YOU TO NOD OFF OR FALL ASLEEP WHEN YOU ARE A PASSENGER IN A CAR FOR AN HOUR WITHOUT A BREAK: 2
HOW LIKELY ARE YOU TO NOD OFF OR FALL ASLEEP WHILE SITTING INACTIVE IN A PUBLIC PLACE: 2
HOW LIKELY ARE YOU TO NOD OFF OR FALL ASLEEP WHILE LYING DOWN TO REST IN THE AFTERNOON WHEN CIRCUMSTANCES PERMIT: 1
HOW LIKELY ARE YOU TO NOD OFF OR FALL ASLEEP WHILE SITTING AND READING: 3
HOW LIKELY ARE YOU TO NOD OFF OR FALL ASLEEP WHILE SITTING QUIETLY AFTER LUNCH WITHOUT ALCOHOL: 2

## 2022-08-29 NOTE — ASSESSMENT & PLAN NOTE
Chronic-Stable: Reviewed and analyzed results of physiologic download from patient's machine and reviewed with patient. Supplies and parts as needed for his machine. These are medically necessary. Limit caffeine use after 3pm. Based on the analyzed data will change following settings: Joane Boxer. Based on the high pressure requirements from his bilevel titration but it confirms that his machine is currently not working properly and needs replaced. He has registered for the recall but the patient will contact Kate Estrada to see if he can be expedited to get a new machine since it is currently not functioning properly. We will try dropping his EPAP min down for now to help with comfort.

## 2022-08-29 NOTE — PROGRESS NOTES
Richard Jeronimo MD  [facility]  [address] [facility], [address] [facility]- [phone]   [facility] [address]  [place]. [address] [place] [address]. [address] [phone]     [address] [address] [phone]      Assessment/Plan:      1. RENA (obstructive sleep apnea)  Assessment & Plan:  Chronic-Stable: Reviewed and analyzed results of physiologic download from patient's machine and reviewed with patient. Supplies and parts as needed for his machine. These are medically necessary. Limit caffeine use after 3pm. Based on the analyzed data will change following settings: Magdaline Hawley. Based on the high pressure requirements from his bilevel titration but it confirms that his machine is currently not working properly and needs replaced. He has registered for the recall but the patient will contact Alexandra Townsend to see if he can be expedited to get a new machine since it is currently not functioning properly. We will try dropping his EPAP min down for now to help with comfort. 2. Primary hypertension  Assessment & Plan:  Chronic- Stable. Discussed the importance of treating sleep apnea as part of the management of this disorder. Cont any meds per PCP and other physicians. 3. Controlled type 2 diabetes mellitus without complication, without long-term current use of insulin (HCC)  Assessment & Plan:  Chronic- Stable. Discussed the importance of treating sleep apnea as part of the management of this disorder. Cont any meds per PCP and other physicians. Reviewed, analyzed, and documented physiologic data from patient's PAP machine. This information was analyzed to assess complexity and medical decision making in regards to further testing and management.     Diagnoses of RENA (obstructive sleep apnea), Primary hypertension, and Controlled type 2 diabetes mellitus without complication, without long-term current use of insulin ([facility] were pertinent to this visit. The chronic medical conditions listed are directly related to the primary diagnosis listed above. The management of the primary diagnosis affects the secondary diagnosis and vice versa. Subjective:   Subjective   Patient ID: Tiffanie Dumont is a 64 y.o. male. Chief Complaint   Patient presents with    Sleep Apnea       HPI:  Modem data for the patient's machine ends prematurely on 2022. Up to that point he was averaging 4.75 hours a night on his machine with average pressure / with an AHI of 3.3/hr. Settings are:  Kyle Larson  EPAPmin-19  PSmin-3  PSmax-5    Had a repeat bilevel titration done that showed he needed a much higher pressure setting to the machine was averaging previously. 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 When he was on vacation he was able to sleep from midnight to 7 AM and felt much better throughout the daytime with consistency in his sleep. He has had a significant increase in his pressure after the titration and at times he does feel high. Coral - Coral Sleepiness Score: 14    Social History     Socioeconomic History    Marital status:      Spouse name: Leesa Harrell    Number of children: 3    Years of education: Not on file    Highest education level: Not on file   Occupational History    Not on file   Tobacco Use    Smoking status: Former     Packs/day: 1.00     Years: 10.00     Pack years: 10.00     Types: Cigarettes     Quit date: 3/21/1993     Years since quittin.4    Smokeless tobacco: Never   Vaping Use    Vaping Use: Never used   Substance and Sexual Activity    Alcohol use:  Yes     Alcohol/week: 1.0 standard drink     Types: 1 Shots of liquor (GLUCOPHAGE) 500 mg, Oral, 2 TIMES DAILY WITH MEALS    simvastatin (ZOCOR) 20 mg, Oral, NIGHTLY    simvastatin (ZOCOR) 20 mg, Oral, NIGHTLY    UNABLE TO FIND Per patient He is not on any OTC medication at this time. Vitals:  Weight BMI   Wt Readings from Last 3 Encounters:   08/29/22 195 lb (88.5 kg)   08/17/22 193 lb 9.6 oz (87.8 kg)   04/20/22 195 lb (88.5 kg)    Body mass index is 29.65 kg/m².      BP HR SaO2   BP Readings from Last 3 Encounters:   08/29/22 (!) 140/70   08/17/22 136/88   04/20/22 (!) 142/80    Pulse Readings from Last 3 Encounters:   08/29/22 62   08/17/22 75   04/20/22 54    SpO2 Readings from Last 3 Encounters:   08/29/22 96%   08/17/22 98%   04/20/22 98%        Electronically signed by Milly Mancia MD on 8/29/2022 at 12:08 PM

## 2022-08-29 NOTE — LETTER
[facility]  [address] [address]  Phone: [phone]  Fax: [phone]    Catrachito Alvarado MD    August 29, 2022     Dunia Kidd MD  [facility] [address]    Patient: David Blanca   MR Number: [id number]   YOB: 1965   Date of Visit: 8/29/2022       Dear Duina Kidd: Thank you for referring Kel Heaton to me for evaluation/treatment. Below are the relevant portions of my assessment and plan of care. 1. RENA (obstructive sleep apnea)  Assessment & Plan:  Chronic-Stable: Reviewed and analyzed results of physiologic download from patient's machine and reviewed with patient. Supplies and parts as needed for his machine. These are medically necessary. Limit caffeine use after 3pm. Based on the analyzed data will change following settings: Reda Check. Based on the high pressure requirements from his bilevel titration but it confirms that his machine is currently not working properly and needs replaced. He has registered for the recall but the patient will contact Albert Candelario to see if he can be expedited to get a new machine since it is currently not functioning properly. We will try dropping his EPAP min down for now to help with comfort. 2. Primary hypertension  Assessment & Plan:  Chronic- Stable. Discussed the importance of treating sleep apnea as part of the management of this disorder. Cont any meds per PCP and other physicians. 3. Controlled type 2 diabetes mellitus without complication, without long-term current use of insulin (HCC)  Assessment & Plan:  Chronic- Stable. Discussed the importance of treating sleep apnea as part of the management of this disorder. Cont any meds per PCP and other physicians. Reviewed, analyzed, and documented physiologic data from patient's PAP machine.     This information was analyzed to assess complexity and medical decision making in regards to further testing and management. Diagnoses of RENA (obstructive sleep apnea), Primary hypertension, and Controlled type 2 diabetes mellitus without complication, without long-term current use of insulin ([facility] were pertinent to this visit. The chronic medical conditions listed are directly related to the primary diagnosis listed above. The management of the primary diagnosis affects the secondary diagnosis and vice versa. If you have questions, please do not hesitate to call me. I look forward to following Lalitha Silva along with you.     Sincerely,      Stephanie Tay MD

## 2022-08-30 ENCOUNTER — TELEPHONE (OUTPATIENT)
Dept: PULMONOLOGY | Age: 57
End: 2022-08-30

## 2022-09-02 RX ORDER — FEXOFENADINE HCL 180 MG/1
180 TABLET ORAL DAILY
Qty: 30 TABLET | Refills: 5 | Status: SHIPPED | OUTPATIENT
Start: 2022-09-02 | End: 2022-10-02

## 2022-09-02 NOTE — TELEPHONE ENCOUNTER
Medication:   Requested Prescriptions     Pending Prescriptions Disp Refills    fexofenadine (ALLEGRA) 180 MG tablet [Pharmacy Med Name: Fexofenadine HCl Oral Tablet 180 MG] 30 tablet 0     Sig: Take 1 tablet by mouth daily        Last Filled:  4/20/2022    Patient Phone Number: [phone] (home)     Last appt: 8/17/2022   Next appt: 10/26/2022    Last OARRS: No flowsheet data found.

## 2022-09-19 ENCOUNTER — TELEPHONE (OUTPATIENT)
Dept: FAMILY MEDICINE CLINIC | Age: 57
End: 2022-09-19

## 2022-09-19 DIAGNOSIS — K64.9 HEMORRHOIDS, UNSPECIFIED HEMORRHOID TYPE: Primary | ICD-10-CM

## 2022-09-19 NOTE — TELEPHONE ENCOUNTER
Referral placed:  [address] [place] [address] and Rectal Surgery - Bala Mejía MD  [address]  [address] [facility], [address] [facility], [address] [place] [address]  [phone]

## 2022-09-19 NOTE — TELEPHONE ENCOUNTER
Patient is in need of a referral for his continual hemorrhoids    He said you can pick who you suggest to go and see     [address] [facility]

## 2022-10-26 ENCOUNTER — OFFICE VISIT (OUTPATIENT)
Dept: FAMILY MEDICINE CLINIC | Age: 57
End: 2022-10-26
Payer: COMMERCIAL

## 2022-10-26 VITALS
DIASTOLIC BLOOD PRESSURE: 80 MMHG | OXYGEN SATURATION: 97 % | WEIGHT: 193 LBS | SYSTOLIC BLOOD PRESSURE: 120 MMHG | HEART RATE: 71 BPM | BODY MASS INDEX: 29.35 KG/M2

## 2022-10-26 DIAGNOSIS — J45.20 MILD INTERMITTENT ASTHMA WITHOUT COMPLICATION: Chronic | ICD-10-CM

## 2022-10-26 DIAGNOSIS — Z23 NEED FOR VACCINATION: ICD-10-CM

## 2022-10-26 DIAGNOSIS — E11.21 CONTROLLED TYPE 2 DIABETES MELLITUS WITH DIABETIC NEPHROPATHY, WITHOUT LONG-TERM CURRENT USE OF INSULIN (HCC): Primary | ICD-10-CM

## 2022-10-26 DIAGNOSIS — I10 PRIMARY HYPERTENSION: Chronic | ICD-10-CM

## 2022-10-26 DIAGNOSIS — E66.9 OBESITY (BMI 30-39.9): ICD-10-CM

## 2022-10-26 DIAGNOSIS — G47.33 OSA (OBSTRUCTIVE SLEEP APNEA): Chronic | ICD-10-CM

## 2022-10-26 DIAGNOSIS — E78.00 PURE HYPERCHOLESTEROLEMIA: Chronic | ICD-10-CM

## 2022-10-26 LAB
CREATININE URINE POCT: 300
HBA1C MFR BLD: 6.1 %
MICROALBUMIN/CREAT 24H UR: 10 MG/G{CREAT}
MICROALBUMIN/CREAT UR-RTO: NORMAL

## 2022-10-26 PROCEDURE — 3074F SYST BP LT 130 MM HG: CPT | Performed by: FAMILY MEDICINE

## 2022-10-26 PROCEDURE — 99214 OFFICE O/P EST MOD 30 MIN: CPT | Performed by: FAMILY MEDICINE

## 2022-10-26 PROCEDURE — 3044F HG A1C LEVEL LT 7.0%: CPT | Performed by: FAMILY MEDICINE

## 2022-10-26 PROCEDURE — 83036 HEMOGLOBIN GLYCOSYLATED A1C: CPT | Performed by: FAMILY MEDICINE

## 2022-10-26 PROCEDURE — 90471 IMMUNIZATION ADMIN: CPT | Performed by: FAMILY MEDICINE

## 2022-10-26 PROCEDURE — 82044 UR ALBUMIN SEMIQUANTITATIVE: CPT | Performed by: FAMILY MEDICINE

## 2022-10-26 PROCEDURE — 90715 TDAP VACCINE 7 YRS/> IM: CPT | Performed by: FAMILY MEDICINE

## 2022-10-26 PROCEDURE — 3078F DIAST BP <80 MM HG: CPT | Performed by: FAMILY MEDICINE

## 2022-10-26 RX ORDER — FLUTICASONE PROPIONATE 50 MCG
2 SPRAY, SUSPENSION (ML) NASAL DAILY
Qty: 16 G | Refills: 5 | Status: SHIPPED | OUTPATIENT
Start: 2022-10-26

## 2022-10-26 RX ORDER — LISINOPRIL 40 MG/1
40 TABLET ORAL DAILY
Qty: 90 TABLET | Refills: 3 | Status: SHIPPED | OUTPATIENT
Start: 2022-10-26

## 2022-10-26 RX ORDER — OMEPRAZOLE 20 MG/1
20 CAPSULE, DELAYED RELEASE ORAL
Qty: 30 CAPSULE | Refills: 5 | Status: SHIPPED | OUTPATIENT
Start: 2022-10-26

## 2022-10-26 NOTE — PROGRESS NOTES
Alyssia Osborn is a 62 y.o. male. HPI:here for complex medical visit  Uses cpap   Wants to get bmi under 28, exercising reegularly  Needs refill flonase  Stinging or open sores on his feet but does get some numbness in his right calf intermittently without notable swelling or pain  Meds, vitamins and allergies reviewed with pt    ROS: No TIA's or unusual headaches, no dysphagia. No prolonged cough. No dyspnea or chest pain on exertion. No abdominal pain, change in bowel habits, black or bloody stools. No urinary tract symptoms. No new or unusual musculoskeletal symptoms. Prior to Visit Medications    Medication Sig Taking? Authorizing Provider   metFORMIN (GLUCOPHAGE) 500 MG tablet Take 500 mg by mouth 2 times daily (with meals) Yes Historical Provider, MD   hydrocortisone (ANUSOL-HC) 2.5 % CREA rectal cream Apply to external hemorrhoid twice a day for 2 weeks.  Yes CHRISTIAN Harkins CNP   lisinopril (PRINIVIL;ZESTRIL) 40 MG tablet Take 1 tablet by mouth daily Yes Zara Gutierrez MD   fluticasone (FLONASE) 50 MCG/ACT nasal spray 2 sprays by Each Nostril route daily Yes Zara Gutierrez MD   albuterol sulfate  (90 Base) MCG/ACT inhaler Inhale 2 puffs into the lungs every 6 hours as needed for Wheezing Yes Zara Gutierrez MD   diclofenac sodium (VOLTAREN) 1 % GEL Apply 2 g topically 4 times daily Yes Zara Gutierrez MD   acetaminophen (TYLENOL) 500 MG tablet Take 2 tablets by mouth every 6 hours as needed for Pain Yes Refugio Elise MD   simvastatin (ZOCOR) 20 MG tablet TAKE 1 TABLET BY MOUTH NIGHTLY Yes Zara Gutierrez MD   blood glucose test strips (FREESTYLE LITE) strip USE TO TEST BLOOD SUGAR ONCE DAILY AS INSTRUCTED Yes Zara Gutierrez MD   FreeStyle Lancets MISC use one time daily Yes Zara Gutierrez MD   ibuprofen (IBU) 600 MG tablet Take 1 tablet by mouth every 8 hours as needed for Pain Yes CHRISTIAN Harkins CNP   glucose monitoring kit (FREESTYLE) 06/09/2016    TRIG 169 (H) 12/02/2015     Lab Results   Component Value Date    HDL 37 (L) 01/19/2022    HDL 41 11/25/2019    HDL 41 09/14/2018     Lab Results   Component Value Date    LDLCALC see below 01/19/2022    LDLCALC 60 11/25/2019    LDLCALC 74 09/14/2018     Lab Results   Component Value Date    LABVLDL see below 01/19/2022    LABVLDL 37 11/25/2019    LABVLDL 31 09/14/2018     No results found for: [place]   Lab Results   Component Value Date/Time     01/19/2022 11:15 AM    K 3.7 01/19/2022 11:15 AM     01/19/2022 11:15 AM    CO2 24 01/19/2022 11:15 AM    BUN 10 01/19/2022 11:15 AM    CREATININE 0.6 01/19/2022 11:15 AM    GLUCOSE 167 10/07/2020 10:16 AM    CALCIUM 8.9 01/19/2022 11:15 AM       ASSESSMENT/PLAN:  1. Controlled type 2 diabetes mellitus with diabetic nephropathy, without long-term current use of insulin (HCC)  Stable, continue metformin  - POCT glycosylated hemoglobin (Hb A1C)  - POCT microalbumin    2. Need for vaccination  Tdap today. Declines flu shot  Urged covid booster pharmacy    3. RENA (obstructive sleep apnea)  CPAP compliant    4. Obesity (BMI 30-39.9)  stable    5. Mild intermittent asthma without complication  stable    6. Primary hypertension -stable  Continue lisinopril    7.  Pure hypercholesterolemia -stable  Continue simvastatin    8 imm  Tdap today  Declines flu  Covid  booster at [facility]

## 2022-12-09 DIAGNOSIS — E78.00 PURE HYPERCHOLESTEROLEMIA: ICD-10-CM

## 2022-12-09 RX ORDER — SIMVASTATIN 20 MG
TABLET ORAL
Qty: 90 TABLET | Refills: 2 | Status: SHIPPED | OUTPATIENT
Start: 2022-12-09

## 2022-12-09 NOTE — TELEPHONE ENCOUNTER
Lov 10/26/22  Lrf  90 3 12/2/21   Lab Results   Component Value Date    CHOL 189 06/26/2017    CHOL 162 06/09/2016    CHOL 173 12/02/2015     Lab Results   Component Value Date    TRIG 162 (H) 06/26/2017    TRIG 182 (H) 06/09/2016    TRIG 169 (H) 12/02/2015     Lab Results   Component Value Date    HDL 37 (L) 01/19/2022    HDL 41 11/25/2019    HDL 41 09/14/2018     Lab Results   Component Value Date    LDLCALC see below 01/19/2022    LDLCALC 60 11/25/2019    LDLCALC 74 09/14/2018     Lab Results   Component Value Date    LABVLDL see below 01/19/2022    LABVLDL 37 11/25/2019    LABVLDL 31 09/14/2018     No results found for: CHOLLISSA

## 2023-01-03 ENCOUNTER — OFFICE VISIT (OUTPATIENT)
Dept: PULMONOLOGY | Age: 58
End: 2023-01-03
Payer: COMMERCIAL

## 2023-01-03 VITALS
TEMPERATURE: 97.6 F | DIASTOLIC BLOOD PRESSURE: 78 MMHG | OXYGEN SATURATION: 96 % | BODY MASS INDEX: 27.63 KG/M2 | SYSTOLIC BLOOD PRESSURE: 120 MMHG | WEIGHT: 193 LBS | HEIGHT: 70 IN | HEART RATE: 76 BPM

## 2023-01-03 DIAGNOSIS — G47.26 SHIFT WORK SLEEP DISORDER: Chronic | ICD-10-CM

## 2023-01-03 DIAGNOSIS — G47.33 OSA (OBSTRUCTIVE SLEEP APNEA): Chronic | ICD-10-CM

## 2023-01-03 DIAGNOSIS — I10 PRIMARY HYPERTENSION: Chronic | ICD-10-CM

## 2023-01-03 PROCEDURE — 99214 OFFICE O/P EST MOD 30 MIN: CPT | Performed by: INTERNAL MEDICINE

## 2023-01-03 PROCEDURE — 3078F DIAST BP <80 MM HG: CPT | Performed by: INTERNAL MEDICINE

## 2023-01-03 PROCEDURE — 3074F SYST BP LT 130 MM HG: CPT | Performed by: INTERNAL MEDICINE

## 2023-01-03 ASSESSMENT — SLEEP AND FATIGUE QUESTIONNAIRES
HOW LIKELY ARE YOU TO NOD OFF OR FALL ASLEEP WHEN YOU ARE A PASSENGER IN A CAR FOR AN HOUR WITHOUT A BREAK: 2
HOW LIKELY ARE YOU TO NOD OFF OR FALL ASLEEP WHILE SITTING INACTIVE IN A PUBLIC PLACE: 2
HOW LIKELY ARE YOU TO NOD OFF OR FALL ASLEEP WHILE SITTING AND TALKING TO SOMEONE: 2
HOW LIKELY ARE YOU TO NOD OFF OR FALL ASLEEP WHILE WATCHING TV: 2
HOW LIKELY ARE YOU TO NOD OFF OR FALL ASLEEP IN A CAR, WHILE STOPPED FOR A FEW MINUTES IN TRAFFIC: 1
ESS TOTAL SCORE: 18
HOW LIKELY ARE YOU TO NOD OFF OR FALL ASLEEP WHILE LYING DOWN TO REST IN THE AFTERNOON WHEN CIRCUMSTANCES PERMIT: 3
HOW LIKELY ARE YOU TO NOD OFF OR FALL ASLEEP WHILE SITTING AND READING: 3
HOW LIKELY ARE YOU TO NOD OFF OR FALL ASLEEP WHILE SITTING QUIETLY AFTER LUNCH WITHOUT ALCOHOL: 3

## 2023-01-03 NOTE — PROGRESS NOTES
[facility]  [id number] [place] [address], [address] [facility]- [phone]   [facility] Diaz Godinez. [address] [place] [address]. Jose [address] [phone]     [address] [address] [phone]      Assessment/Plan:      1. RENA (obstructive sleep apnea)  Assessment & Plan:  Chronic-Stable: Reviewed and analyzed results of physiologic download from patient's machine and reviewed with patient. Supplies and parts as needed for his machine. These are medically necessary. Limit caffeine use after 3pm. Based on the analyzed data will continue with current settings   2. Primary hypertension  Assessment & Plan:  Chronic- Stable. Discussed the importance of treating sleep apnea as part of the management of this disorder. Cont any meds per PCP and other physicians. 3. Shift work sleep disorder  Assessment & Plan:  Chronic- Stable. Discussed the importance of treating sleep apnea as part of the management of this disorder. Cont any meds per PCP and other physicians. He is to continue to work on getting as much time in bed as possible and using his machine every time he sleeps. Reviewed, analyzed, and documented physiologic data from patient's PAP machine. This information was analyzed to assess complexity and medical decision making in regards to further testing and management. Diagnoses of RENA (obstructive sleep apnea), Primary hypertension, and Shift work sleep disorder were pertinent to this visit. The chronic medical conditions listed are directly related to the primary diagnosis listed above. The management of the primary diagnosis affects the secondary diagnosis and vice versa. Subjective:   Subjective   Patient ID: Shyam Christensen is a 62 y.o. male.     Chief Complaint   Patient presents with    Sleep Apnea       HPI:  Machine Modem/Download Info:  Compliance (hours/night): 4.2 hrs/night  % of nights >= 4 hrs: 45.6 %  Download AHI (/hour): 2 /HR  Average IPAP Pressure: 21 cmH2O  Average EPAP Pressure: 17 cmH2O    AUTO BIPAP - Settings (Meghan)  IPAP Max: 25 cmH2O  EPAP Min: 17 cmH2O  Pressure Support Min: 3  Pressure Support Max: 5             Comfort Settings  Humidity Level (0-8): 1  Flex/EPR (0-3): 3       Feels along the pressure is helped and he is sleeping more comfortably with his machine. The download still shows a sleep apnea is controlled. He still struggles to sleep more than 5 hours total.  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 He can sleep longer he feels better and he feels more rested. [facility]    Hermann - Hermann Sleepiness Score: 18    Social History     Socioeconomic History    Marital status:      Spouse name: Almas Badillo    Number of children: 3    Years of education: Not on file    Highest education level: Not on file   Occupational History    Not on file   Tobacco Use    Smoking status: Former     Packs/day: 1.00     Years: 10.00     Pack years: 10.00     Types: Cigarettes     Quit date: 3/21/1993     Years since quittin.8    Smokeless tobacco: Never   Vaping Use    Vaping Use: Never used   Substance and Sexual Activity    Alcohol use:  Yes     Alcohol/week: 1.0 standard drink     Types: 1 Shots of liquor per week    Drug use: No    Sexual activity: Not on file   Other Topics Concern    Not on file   Social History Narrative    Not on file     Social Determinants of Health     Financial Resource Strain: Low Risk     Difficulty of Paying Living Expenses: Not hard at all   Food Insecurity: No Food Insecurity    Worried About Running Out of Food in the Last Year: Never true    Ran Out of Food in the Last Year: Never true   Transportation Needs: Not on file Physical Activity: Not on file   Stress: Not on file   Social Connections: Not on file   Intimate Partner Violence: Not on file   Housing Stability: Not on file       Current Outpatient Medications   Medication Instructions    acetaminophen (TYLENOL) 1,000 mg, Oral, EVERY 6 HOURS PRN    albuterol sulfate  (90 Base) MCG/ACT inhaler 2 puffs, Inhalation, EVERY 6 HOURS PRN    blood glucose test strips (FREESTYLE LITE) strip USE TO TEST BLOOD SUGAR ONCE DAILY AS INSTRUCTED    Blood Pressure KIT Please dispense one BP kit    Chlorphen-Phenyleph-APAP 2-5-325 MG TABS 1 tablet, Oral, 4 TIMES DAILY PRN    diclofenac sodium (VOLTAREN) 2 g, Topical, 4 TIMES DAILY    fluticasone (FLONASE) 50 MCG/ACT nasal spray 2 sprays, Each Nostril, DAILY    FreeStyle Lancets MISC use one time daily    glucose monitoring kit (FREESTYLE) monitoring kit Use to check BS daily    hydrocortisone (ANUSOL-HC) 2.5 % CREA rectal cream Apply to external hemorrhoid twice a day for 2 weeks. ibuprofen (IBU) 600 mg, Oral, EVERY 8 HOURS PRN    lisinopril (PRINIVIL;ZESTRIL) 40 mg, Oral, DAILY    metFORMIN (GLUCOPHAGE) 500 mg, Oral, 2 TIMES DAILY WITH MEALS    omeprazole (PRILOSEC) 20 mg, Oral, DAILY BEFORE BREAKFAST    simvastatin (ZOCOR) 20 MG tablet TAKE 1 TABLET BY MOUTH EVERY DAY AT NIGHT          Vitals:  Weight BMI   Wt Readings from Last 3 Encounters:   01/03/23 193 lb (87.5 kg)   10/26/22 193 lb (87.5 kg)   08/29/22 195 lb (88.5 kg)    Body mass index is 28.01 kg/m².      BP HR SaO2   BP Readings from Last 3 Encounters:   01/03/23 120/78   10/26/22 120/80   08/29/22 (!) 140/70    Pulse Readings from Last 3 Encounters:   01/03/23 76   10/26/22 71   08/29/22 62    SpO2 Readings from Last 3 Encounters:   01/03/23 96%   10/26/22 97%   08/29/22 96%        Electronically signed by Fay Ordoñez MD on 1/3/2023 at 11:49 AM

## 2023-01-03 NOTE — LETTER
[facility]  [address] [facility]  [address] [facility]  Phone: [phone]  Fax: [phone]    Juan Ramon Garg MD    January 3, 2023     Selina Li MD  [address] [facility] [address] [place] [address]    Patient: Dori Meckel   MR Number: [id number]   YOB: 1965   Date of Visit: 1/3/2023       Dear Selina Li: Thank you for referring Sugar Shetty to me for evaluation/treatment. Below are the relevant portions of my assessment and plan of care. 1. RENA (obstructive sleep apnea)  Assessment & Plan:  Chronic-Stable: Reviewed and analyzed results of physiologic download from patient's machine and reviewed with patient. Supplies and parts as needed for his machine. These are medically necessary. Limit caffeine use after 3pm. Based on the analyzed data will continue with current settings   2. Primary hypertension  Assessment & Plan:  Chronic- Stable. Discussed the importance of treating sleep apnea as part of the management of this disorder. Cont any meds per PCP and other physicians. 3. Shift work sleep disorder  Assessment & Plan:  Chronic- Stable. Discussed the importance of treating sleep apnea as part of the management of this disorder. Cont any meds per PCP and other physicians. He is to continue to work on getting as much time in bed as possible and using his machine every time he sleeps. Reviewed, analyzed, and documented physiologic data from patient's PAP machine. This information was analyzed to assess complexity and medical decision making in regards to further testing and management. Diagnoses of RENA (obstructive sleep apnea), Primary hypertension, and Shift work sleep disorder were pertinent to this visit. The chronic medical conditions listed are directly related to the primary diagnosis listed above. The management of the primary diagnosis affects the secondary diagnosis and vice versa.        If you have questions, please do not hesitate to call me. I look forward to following Frederica Meckel along with you.     Sincerely,      Leatha Smith MD

## 2023-01-03 NOTE — ASSESSMENT & PLAN NOTE
Chronic- Stable. Discussed the importance of treating sleep apnea as part of the management of this disorder. Cont any meds per PCP and other physicians. He is to continue to work on getting as much time in bed as possible and using his machine every time he sleeps.

## 2023-03-13 RX ORDER — FEXOFENADINE HCL 180 MG/1
TABLET ORAL
Qty: 30 TABLET | Refills: 5 | Status: SHIPPED | OUTPATIENT
Start: 2023-03-13

## 2023-03-13 RX ORDER — OMEPRAZOLE 20 MG/1
20 CAPSULE, DELAYED RELEASE ORAL
Qty: 30 CAPSULE | Refills: 5 | Status: SHIPPED | OUTPATIENT
Start: 2023-03-13

## 2023-05-17 ENCOUNTER — OFFICE VISIT (OUTPATIENT)
Dept: FAMILY MEDICINE CLINIC | Age: 58
End: 2023-05-17
Payer: COMMERCIAL

## 2023-05-17 VITALS
DIASTOLIC BLOOD PRESSURE: 82 MMHG | WEIGHT: 197 LBS | OXYGEN SATURATION: 98 % | HEART RATE: 85 BPM | BODY MASS INDEX: 28.59 KG/M2 | SYSTOLIC BLOOD PRESSURE: 120 MMHG

## 2023-05-17 DIAGNOSIS — E11.21 CONTROLLED TYPE 2 DIABETES MELLITUS WITH DIABETIC NEPHROPATHY, WITHOUT LONG-TERM CURRENT USE OF INSULIN (HCC): ICD-10-CM

## 2023-05-17 DIAGNOSIS — Z12.5 PROSTATE CANCER SCREENING: ICD-10-CM

## 2023-05-17 DIAGNOSIS — I10 PRIMARY HYPERTENSION: Primary | Chronic | ICD-10-CM

## 2023-05-17 DIAGNOSIS — G47.33 OSA (OBSTRUCTIVE SLEEP APNEA): Chronic | ICD-10-CM

## 2023-05-17 DIAGNOSIS — E78.00 PURE HYPERCHOLESTEROLEMIA: Chronic | ICD-10-CM

## 2023-05-17 DIAGNOSIS — J45.20 MILD INTERMITTENT ASTHMA WITHOUT COMPLICATION: Chronic | ICD-10-CM

## 2023-05-17 DIAGNOSIS — I10 PRIMARY HYPERTENSION: Chronic | ICD-10-CM

## 2023-05-17 LAB
ALBUMIN SERPL-MCNC: 4.5 G/DL (ref 3.4–5)
ALBUMIN/GLOB SERPL: 2 {RATIO} (ref 1.1–2.2)
ALP SERPL-CCNC: 71 U/L (ref 40–129)
ALT SERPL-CCNC: 21 U/L (ref 10–40)
ANION GAP SERPL CALCULATED.3IONS-SCNC: 13 MMOL/L (ref 3–16)
AST SERPL-CCNC: 18 U/L (ref 15–37)
BASOPHILS # BLD: 0.1 K/UL (ref 0–0.2)
BASOPHILS NFR BLD: 1.2 %
BILIRUB SERPL-MCNC: 0.5 MG/DL (ref 0–1)
BUN SERPL-MCNC: 14 MG/DL (ref 7–20)
CALCIUM SERPL-MCNC: 8.6 MG/DL (ref 8.3–10.6)
CHLORIDE SERPL-SCNC: 103 MMOL/L (ref 99–110)
CHOLEST SERPL-MCNC: 189 MG/DL (ref 0–199)
CO2 SERPL-SCNC: 26 MMOL/L (ref 21–32)
CREAT SERPL-MCNC: 0.7 MG/DL (ref 0.9–1.3)
DEPRECATED RDW RBC AUTO: 13.2 % (ref 12.4–15.4)
EOSINOPHIL # BLD: 0.2 K/UL (ref 0–0.6)
EOSINOPHIL NFR BLD: 4.5 %
GFR SERPLBLD CREATININE-BSD FMLA CKD-EPI: >60 ML/MIN/{1.73_M2}
GLUCOSE P FAST SERPL-MCNC: 143 MG/DL (ref 70–99)
HBA1C MFR BLD: 6.7 %
HCT VFR BLD AUTO: 42.3 % (ref 40.5–52.5)
HDLC SERPL-MCNC: 44 MG/DL (ref 40–60)
HGB BLD-MCNC: 14.3 G/DL (ref 13.5–17.5)
LDL CHOLESTEROL CALCULATED: 109 MG/DL
LYMPHOCYTES # BLD: 2 K/UL (ref 1–5.1)
LYMPHOCYTES NFR BLD: 36.3 %
MCH RBC QN AUTO: 31.5 PG (ref 26–34)
MCHC RBC AUTO-ENTMCNC: 33.9 G/DL (ref 31–36)
MCV RBC AUTO: 93 FL (ref 80–100)
MONOCYTES # BLD: 0.5 K/UL (ref 0–1.3)
MONOCYTES NFR BLD: 9.2 %
NEUTROPHILS # BLD: 2.7 K/UL (ref 1.7–7.7)
NEUTROPHILS NFR BLD: 48.8 %
PLATELET # BLD AUTO: 168 K/UL (ref 135–450)
PMV BLD AUTO: 9.6 FL (ref 5–10.5)
POTASSIUM SERPL-SCNC: 4.3 MMOL/L (ref 3.5–5.1)
PROT SERPL-MCNC: 6.8 G/DL (ref 6.4–8.2)
PSA SERPL DL<=0.01 NG/ML-MCNC: 0.39 NG/ML (ref 0–4)
RBC # BLD AUTO: 4.55 M/UL (ref 4.2–5.9)
SODIUM SERPL-SCNC: 142 MMOL/L (ref 136–145)
TRIGL SERPL-MCNC: 178 MG/DL (ref 0–150)
TSH SERPL DL<=0.005 MIU/L-ACNC: 1.93 UIU/ML (ref 0.27–4.2)
VLDLC SERPL CALC-MCNC: 36 MG/DL
WBC # BLD AUTO: 5.5 K/UL (ref 4–11)

## 2023-05-17 PROCEDURE — 3074F SYST BP LT 130 MM HG: CPT | Performed by: FAMILY MEDICINE

## 2023-05-17 PROCEDURE — 83036 HEMOGLOBIN GLYCOSYLATED A1C: CPT | Performed by: FAMILY MEDICINE

## 2023-05-17 PROCEDURE — 3079F DIAST BP 80-89 MM HG: CPT | Performed by: FAMILY MEDICINE

## 2023-05-17 PROCEDURE — 99214 OFFICE O/P EST MOD 30 MIN: CPT | Performed by: FAMILY MEDICINE

## 2023-05-17 PROCEDURE — 3044F HG A1C LEVEL LT 7.0%: CPT | Performed by: FAMILY MEDICINE

## 2023-05-17 RX ORDER — ALBUTEROL SULFATE 90 UG/1
2 AEROSOL, METERED RESPIRATORY (INHALATION) EVERY 6 HOURS PRN
Qty: 18 G | Refills: 3 | Status: SHIPPED | OUTPATIENT
Start: 2023-05-17

## 2023-05-17 RX ORDER — LISINOPRIL 40 MG/1
40 TABLET ORAL DAILY
Qty: 90 TABLET | Refills: 3 | Status: SHIPPED | OUTPATIENT
Start: 2023-05-17

## 2023-05-17 ASSESSMENT — PATIENT HEALTH QUESTIONNAIRE - PHQ9
SUM OF ALL RESPONSES TO PHQ9 QUESTIONS 1 & 2: 0
1. LITTLE INTEREST OR PLEASURE IN DOING THINGS: 0
SUM OF ALL RESPONSES TO PHQ QUESTIONS 1-9: 0
2. FEELING DOWN, DEPRESSED OR HOPELESS: 0
SUM OF ALL RESPONSES TO PHQ QUESTIONS 1-9: 0

## 2023-05-17 NOTE — PROGRESS NOTES
Kami Nicolas is a 62 y.o. male. HPI:here for complex medical visit  Did see opthal  Needs labs  Mild intermittent bilat sciatica, no back pain now  Meds, vitamins and allergies reviewed with pt    ROS: No TIA's or unusual headaches, no dysphagia. No prolonged cough. No dyspnea or chest pain on exertion. No abdominal pain, change in bowel habits, black or bloody stools. No urinary tract symptoms. No new or unusual musculoskeletal symptoms. Prior to Visit Medications    Medication Sig Taking? Authorizing Provider   omeprazole (PRILOSEC) 20 MG delayed release capsule Take 1 capsule by mouth every morning (before breakfast) Yes Filippo Beckett MD   fexofenadine (ALLEGRA) 180 MG tablet TAKE 1 TABLET BY MOUTH EVERY DAY Yes Filippo Beckett MD   simvastatin (ZOCOR) 20 MG tablet TAKE 1 TABLET BY MOUTH EVERY DAY AT NIGHT Yes Filippo Beckett MD   fluticasone (FLONASE) 50 MCG/ACT nasal spray 2 sprays by Each Nostril route daily Yes Filippo Beckett MD   lisinopril (PRINIVIL;ZESTRIL) 40 MG tablet Take 1 tablet by mouth daily Yes Filippo Beckett MD   metFORMIN (GLUCOPHAGE) 500 MG tablet Take 1 tablet by mouth 2 times daily (with meals) Yes Historical Provider, MD   hydrocortisone (ANUSOL-HC) 2.5 % CREA rectal cream Apply to external hemorrhoid twice a day for 2 weeks.  Yes CHRISTIAN Nieves CNP   albuterol sulfate  (90 Base) MCG/ACT inhaler Inhale 2 puffs into the lungs every 6 hours as needed for Wheezing Yes Filippo Beckett MD   diclofenac sodium (VOLTAREN) 1 % GEL Apply 2 g topically 4 times daily Yes Filippo Beckett MD   acetaminophen (TYLENOL) 500 MG tablet Take 2 tablets by mouth every 6 hours as needed for Pain Yes Arely Lora MD   blood glucose test strips (FREESTYLE LITE) strip USE TO TEST BLOOD SUGAR ONCE DAILY AS INSTRUCTED Yes Filippo Beckett MD   FreeStyle Lancets MISC use one time daily Yes Filippo Beckett MD   ibuprofen (IBU) 600 MG tablet Take 1

## 2023-07-03 ENCOUNTER — OFFICE VISIT (OUTPATIENT)
Dept: PULMONOLOGY | Age: 58
End: 2023-07-03
Payer: COMMERCIAL

## 2023-07-03 VITALS
HEART RATE: 86 BPM | WEIGHT: 198 LBS | HEIGHT: 70 IN | SYSTOLIC BLOOD PRESSURE: 146 MMHG | BODY MASS INDEX: 28.35 KG/M2 | DIASTOLIC BLOOD PRESSURE: 87 MMHG | OXYGEN SATURATION: 95 %

## 2023-07-03 DIAGNOSIS — G47.33 OSA (OBSTRUCTIVE SLEEP APNEA): Chronic | ICD-10-CM

## 2023-07-03 DIAGNOSIS — G47.26 SHIFT WORK SLEEP DISORDER: Chronic | ICD-10-CM

## 2023-07-03 DIAGNOSIS — E11.21 CONTROLLED TYPE 2 DIABETES MELLITUS WITH DIABETIC NEPHROPATHY, WITHOUT LONG-TERM CURRENT USE OF INSULIN (HCC): Chronic | ICD-10-CM

## 2023-07-03 DIAGNOSIS — I10 PRIMARY HYPERTENSION: Chronic | ICD-10-CM

## 2023-07-03 PROCEDURE — 3079F DIAST BP 80-89 MM HG: CPT | Performed by: INTERNAL MEDICINE

## 2023-07-03 PROCEDURE — 3077F SYST BP >= 140 MM HG: CPT | Performed by: INTERNAL MEDICINE

## 2023-07-03 PROCEDURE — 3044F HG A1C LEVEL LT 7.0%: CPT | Performed by: INTERNAL MEDICINE

## 2023-07-03 PROCEDURE — 99214 OFFICE O/P EST MOD 30 MIN: CPT | Performed by: INTERNAL MEDICINE

## 2023-07-03 ASSESSMENT — SLEEP AND FATIGUE QUESTIONNAIRES
HOW LIKELY ARE YOU TO NOD OFF OR FALL ASLEEP WHILE LYING DOWN TO REST IN THE AFTERNOON WHEN CIRCUMSTANCES PERMIT: 1
HOW LIKELY ARE YOU TO NOD OFF OR FALL ASLEEP WHILE SITTING QUIETLY AFTER LUNCH WITHOUT ALCOHOL: 2
HOW LIKELY ARE YOU TO NOD OFF OR FALL ASLEEP WHILE SITTING AND READING: 3
HOW LIKELY ARE YOU TO NOD OFF OR FALL ASLEEP WHILE WATCHING TV: 3
HOW LIKELY ARE YOU TO NOD OFF OR FALL ASLEEP WHILE SITTING AND TALKING TO SOMEONE: 2
HOW LIKELY ARE YOU TO NOD OFF OR FALL ASLEEP WHEN YOU ARE A PASSENGER IN A CAR FOR AN HOUR WITHOUT A BREAK: 2
HOW LIKELY ARE YOU TO NOD OFF OR FALL ASLEEP WHILE SITTING INACTIVE IN A PUBLIC PLACE: 2
ESS TOTAL SCORE: 16
HOW LIKELY ARE YOU TO NOD OFF OR FALL ASLEEP IN A CAR, WHILE STOPPED FOR A FEW MINUTES IN TRAFFIC: 1

## 2023-07-03 NOTE — ASSESSMENT & PLAN NOTE
Chronic-not Stable: Reviewed and analyzed results of physiologic download from patient's machine and reviewed with patient. Supplies and parts as needed for his machine. These are medically necessary. Limit caffeine use after 3pm. Based on the analyzed data will change following settings: EPAPmin-10 for now and slowly work his EPAP pressure up since her such a difference between his previous EPAP pressure and the default the machine was set at.   Patient given the number to [facility] to contact for replacement modem

## 2023-07-03 NOTE — PROGRESS NOTES
Britt Dawkins CNP  [address] Trotter [facility] [address] [address], [address] [facility] [phone]   Johann Pascual [address] [place] [address] [phone]     [address] [place] [address]  Dept: [phone]  Dept Fax: [phone]  Loc: [phone]      Assessment/Plan:      1. RENA (obstructive sleep apnea)  Assessment & Plan:  Chronic-not Stable: Reviewed and analyzed results of physiologic download from patient's machine and reviewed with patient. Supplies and parts as needed for his machine. These are medically necessary. Limit caffeine use after 3pm. Based on the analyzed data will change following settings: EPAPmin-10 for now and slowly work his EPAP pressure up since her such a difference between his previous EPAP pressure and the default the machine was set at. Patient given the number to [facility] to contact for replacement modem  2. Primary hypertension  Assessment & Plan:  Chronic- Stable. Discussed the importance of treating sleep apnea as part of the management of this disorder. Cont any meds per PCP and other physicians. 3. Controlled type 2 diabetes mellitus with diabetic nephropathy, without long-term current use of insulin (HCC)  Assessment & Plan:  Chronic- Stable. Discussed the importance of treating sleep apnea as part of the management of this disorder. Cont any meds per PCP and other physicians. 4. Shift work sleep disorder  Assessment & Plan:  Chronic- Stable. Discussed the importance of treating sleep apnea as part of the management of this disorder. Cont any meds per PCP and other physicians. He is to continue to work on getting as much time in bed as possible and using his machine every time he sleeps. Reviewed, analyzed, and documented physiologic data from patient's PAP machine.     This information was

## 2023-07-12 ENCOUNTER — TELEPHONE (OUTPATIENT)
Dept: FAMILY MEDICINE CLINIC | Age: 58
End: 2023-07-12

## 2023-07-12 DIAGNOSIS — E11.9 CONTROLLED TYPE 2 DIABETES MELLITUS WITHOUT COMPLICATION, WITHOUT LONG-TERM CURRENT USE OF INSULIN (HCC): Chronic | ICD-10-CM

## 2023-07-12 RX ORDER — BLOOD-GLUCOSE METER
KIT MISCELLANEOUS
Qty: 100 STRIP | Refills: 0 | Status: SHIPPED | OUTPATIENT
Start: 2023-07-12

## 2023-07-12 NOTE — TELEPHONE ENCOUNTER
Medication:   Requested Prescriptions     Pending Prescriptions Disp Refills    blood glucose test strips (FREESTYLE LITE) strip [Pharmacy Med Name: FreeStyle Lite Test In Vitro Strip] 100 strip 0     Sig: test blood sugar once daily as instructed.          Last appt: 5/17/2023   Next appt: Visit date not found    Last Labs DM:   Lab Results   Component Value Date/Time    LABA1C 6.7 05/17/2023 10:14 AM

## 2023-07-12 NOTE — TELEPHONE ENCOUNTER
Patient called and said he is completely out of the below medication.  Please Advise    blood glucose test strips (FREESTYLE LITE) strip

## 2023-08-15 ENCOUNTER — TELEPHONE (OUTPATIENT)
Dept: FAMILY MEDICINE CLINIC | Age: 58
End: 2023-08-15

## 2023-08-15 NOTE — TELEPHONE ENCOUNTER
Patient thinks he has a start of a UTI and wants to drop off a urine sample in the morning if possible     Symptoms started yesterday and pain and frequent urinating     Please call patient if he can drop off sample in am     [facility] is [facility]

## 2023-08-16 ENCOUNTER — NURSE ONLY (OUTPATIENT)
Dept: FAMILY MEDICINE CLINIC | Age: 58
End: 2023-08-16
Payer: COMMERCIAL

## 2023-08-16 DIAGNOSIS — R39.9 UTI SYMPTOMS: Primary | ICD-10-CM

## 2023-08-16 LAB
BILIRUBIN, POC: NORMAL
BLOOD URINE, POC: NORMAL
CLARITY, POC: CLEAR
COLOR, POC: YELLOW
GLUCOSE URINE, POC: 250
KETONES, POC: NORMAL
LEUKOCYTE EST, POC: NORMAL
NITRITE, POC: NORMAL
PH, POC: 5
PROTEIN, POC: 30
SPECIFIC GRAVITY, POC: 1.02
UROBILINOGEN, POC: 0.2

## 2023-08-16 PROCEDURE — 81002 URINALYSIS NONAUTO W/O SCOPE: CPT | Performed by: FAMILY MEDICINE

## 2023-08-17 LAB — BACTERIA UR CULT: NORMAL

## 2023-08-28 ENCOUNTER — OFFICE VISIT (OUTPATIENT)
Dept: FAMILY MEDICINE CLINIC | Age: 58
End: 2023-08-28
Payer: COMMERCIAL

## 2023-08-28 VITALS
OXYGEN SATURATION: 99 % | TEMPERATURE: 98.6 F | DIASTOLIC BLOOD PRESSURE: 82 MMHG | SYSTOLIC BLOOD PRESSURE: 135 MMHG | HEART RATE: 72 BPM

## 2023-08-28 DIAGNOSIS — R30.9 PAINFUL URINATION: Primary | ICD-10-CM

## 2023-08-28 LAB
BILIRUBIN, POC: NORMAL
BLOOD URINE, POC: NORMAL
CLARITY, POC: CLEAR
COLOR, POC: YELLOW
GLUCOSE URINE, POC: NORMAL
KETONES, POC: NEGATIVE
LEUKOCYTE EST, POC: NORMAL
NITRITE, POC: NEGATIVE
PH, POC: 6
PROTEIN, POC: NORMAL
SPECIFIC GRAVITY, POC: 1.01
UROBILINOGEN, POC: 0.2

## 2023-08-28 PROCEDURE — 3075F SYST BP GE 130 - 139MM HG: CPT | Performed by: FAMILY MEDICINE

## 2023-08-28 PROCEDURE — 3079F DIAST BP 80-89 MM HG: CPT | Performed by: FAMILY MEDICINE

## 2023-08-28 PROCEDURE — 99213 OFFICE O/P EST LOW 20 MIN: CPT | Performed by: FAMILY MEDICINE

## 2023-08-28 PROCEDURE — 81002 URINALYSIS NONAUTO W/O SCOPE: CPT | Performed by: FAMILY MEDICINE

## 2023-08-28 RX ORDER — CIPROFLOXACIN 500 MG/1
500 TABLET, FILM COATED ORAL 2 TIMES DAILY
Qty: 14 TABLET | Refills: 0 | Status: SHIPPED | OUTPATIENT
Start: 2023-08-28 | End: 2023-09-04

## 2023-08-28 RX ORDER — PHENAZOPYRIDINE HYDROCHLORIDE 100 MG/1
100 TABLET, FILM COATED ORAL 3 TIMES DAILY PRN
Qty: 12 TABLET | Refills: 0 | Status: SHIPPED | OUTPATIENT
Start: 2023-08-28 | End: 2024-08-27

## 2023-08-28 SDOH — ECONOMIC STABILITY: INCOME INSECURITY: HOW HARD IS IT FOR YOU TO PAY FOR THE VERY BASICS LIKE FOOD, HOUSING, MEDICAL CARE, AND HEATING?: PATIENT DECLINED

## 2023-08-28 SDOH — ECONOMIC STABILITY: TRANSPORTATION INSECURITY
IN THE PAST 12 MONTHS, HAS LACK OF TRANSPORTATION KEPT YOU FROM MEETINGS, WORK, OR FROM GETTING THINGS NEEDED FOR DAILY LIVING?: PATIENT DECLINED

## 2023-08-28 SDOH — ECONOMIC STABILITY: HOUSING INSECURITY
IN THE LAST 12 MONTHS, WAS THERE A TIME WHEN YOU DID NOT HAVE A STEADY PLACE TO SLEEP OR SLEPT IN A SHELTER (INCLUDING NOW)?: PATIENT REFUSED

## 2023-08-28 SDOH — ECONOMIC STABILITY: FOOD INSECURITY: WITHIN THE PAST 12 MONTHS, THE FOOD YOU BOUGHT JUST DIDN'T LAST AND YOU DIDN'T HAVE MONEY TO GET MORE.: PATIENT DECLINED

## 2023-08-28 SDOH — ECONOMIC STABILITY: FOOD INSECURITY: WITHIN THE PAST 12 MONTHS, YOU WORRIED THAT YOUR FOOD WOULD RUN OUT BEFORE YOU GOT MONEY TO BUY MORE.: PATIENT DECLINED

## 2023-08-28 NOTE — PROGRESS NOTES
Kely Lewis is a 62 y.o. male. HPI:  Dysuria , burning for last 2 days  3 weeks ago had same symptoms while in [facility]- got macrobid and Pyridium doctor there and got better  Also suffers from some diarrhea and probable dehydration  Left flank pain last 15 hr, no hematuria no nausea or vomiting  Wt Readings from Last 3 Encounters:   07/03/23 198 lb (89.8 kg)   05/17/23 197 lb (89.4 kg)   01/03/23 193 lb (87.5 kg)     Meds, vitamins and allergies reviewed with Patient    ROS:  Gen: no fever  HEENT:  no cold symptoms, no sore throat. CV:  Denies chest pain or palpitations. Pulm:  Denies shortness of breath, cough. Abd:  Denies abdominal pain, nausea and vomiting. Skin: no rash    No Known Allergies    Prior to Visit Medications    Medication Sig Taking? Authorizing Provider   blood glucose test strips (FREESTYLE LITE) strip test blood sugar once daily as instructed. Yes Marlena Lovell MD   lisinopril (PRINIVIL;ZESTRIL) 40 MG tablet Take 1 tablet by mouth daily Yes Marlena Lovell MD   albuterol sulfate HFA (PROVENTIL;VENTOLIN;PROAIR) 108 (90 Base) MCG/ACT inhaler Inhale 2 puffs into the lungs every 6 hours as needed for Wheezing Yes Marlena Lovell MD   omeprazole (PRILOSEC) 20 MG delayed release capsule Take 1 capsule by mouth every morning (before breakfast) Yes Marlena Lovell MD   fexofenadine (ALLEGRA) 180 MG tablet TAKE 1 TABLET BY MOUTH EVERY DAY Yes Marlena Lovell MD   simvastatin (ZOCOR) 20 MG tablet TAKE 1 TABLET BY MOUTH EVERY DAY AT NIGHT Yes Marlena Lovell MD   fluticasone (FLONASE) 50 MCG/ACT nasal spray 2 sprays by Each Nostril route daily Yes Marlena Lovell MD   metFORMIN (GLUCOPHAGE) 500 MG tablet Take 1 tablet by mouth 2 times daily (with meals) Yes Historical Provider, MD   hydrocortisone (ANUSOL-HC) 2.5 % CREA rectal cream Apply to external hemorrhoid twice a day for 2 weeks.  Yes Milton Taylor, APRN - CNP   FreeStyle Lancets MISC use one time daily Yes Vincent JOHNSON Statement Selected

## 2023-08-29 RX ORDER — FEXOFENADINE HCL 180 MG/1
180 TABLET ORAL DAILY
Qty: 30 TABLET | Refills: 5 | Status: SHIPPED | OUTPATIENT
Start: 2023-08-29

## 2023-08-30 LAB
BACTERIA UR CULT: ABNORMAL
ORGANISM: ABNORMAL

## 2023-08-31 ENCOUNTER — TELEPHONE (OUTPATIENT)
Dept: FAMILY MEDICINE CLINIC | Age: 58
End: 2023-08-31

## 2023-08-31 NOTE — TELEPHONE ENCOUNTER
Pt did get results confirming he has a uti. Pt has a fever today and was asking if that was because of the uti?

## 2023-09-25 ENCOUNTER — OFFICE VISIT (OUTPATIENT)
Dept: FAMILY MEDICINE CLINIC | Age: 58
End: 2023-09-25
Payer: COMMERCIAL

## 2023-09-25 VITALS
SYSTOLIC BLOOD PRESSURE: 136 MMHG | DIASTOLIC BLOOD PRESSURE: 84 MMHG | OXYGEN SATURATION: 98 % | WEIGHT: 194.4 LBS | HEART RATE: 77 BPM | BODY MASS INDEX: 28.22 KG/M2

## 2023-09-25 DIAGNOSIS — E11.9 CONTROLLED TYPE 2 DIABETES MELLITUS WITHOUT COMPLICATION, WITHOUT LONG-TERM CURRENT USE OF INSULIN (HCC): ICD-10-CM

## 2023-09-25 DIAGNOSIS — R39.9 UTI SYMPTOMS: Primary | ICD-10-CM

## 2023-09-25 DIAGNOSIS — R30.9 PAINFUL URINATION: ICD-10-CM

## 2023-09-25 LAB
BILIRUBIN, POC: NORMAL
BLOOD URINE, POC: NORMAL
CLARITY, POC: NORMAL
COLOR, POC: YELLOW
GLUCOSE URINE, POC: 100
HBA1C MFR BLD: 6.9 %
KETONES, POC: NORMAL
LEUKOCYTE EST, POC: NORMAL
NITRITE, POC: NORMAL
PH, POC: 6
PROTEIN, POC: NORMAL
SPECIFIC GRAVITY, POC: 1.02
UROBILINOGEN, POC: 0.2

## 2023-09-25 PROCEDURE — 99214 OFFICE O/P EST MOD 30 MIN: CPT | Performed by: NURSE PRACTITIONER

## 2023-09-25 PROCEDURE — 3044F HG A1C LEVEL LT 7.0%: CPT | Performed by: NURSE PRACTITIONER

## 2023-09-25 PROCEDURE — 3075F SYST BP GE 130 - 139MM HG: CPT | Performed by: NURSE PRACTITIONER

## 2023-09-25 PROCEDURE — 83036 HEMOGLOBIN GLYCOSYLATED A1C: CPT | Performed by: NURSE PRACTITIONER

## 2023-09-25 PROCEDURE — 81002 URINALYSIS NONAUTO W/O SCOPE: CPT | Performed by: NURSE PRACTITIONER

## 2023-09-25 PROCEDURE — 3079F DIAST BP 80-89 MM HG: CPT | Performed by: NURSE PRACTITIONER

## 2023-09-25 RX ORDER — PHENAZOPYRIDINE HYDROCHLORIDE 100 MG/1
100 TABLET, FILM COATED ORAL 3 TIMES DAILY PRN
Qty: 12 TABLET | Refills: 0 | Status: SHIPPED | OUTPATIENT
Start: 2023-09-25 | End: 2024-09-24

## 2023-09-25 ASSESSMENT — ENCOUNTER SYMPTOMS: ABDOMINAL PAIN: 0

## 2023-09-25 ASSESSMENT — PATIENT HEALTH QUESTIONNAIRE - PHQ9
SUM OF ALL RESPONSES TO PHQ QUESTIONS 1-9: 0
1. LITTLE INTEREST OR PLEASURE IN DOING THINGS: 0
SUM OF ALL RESPONSES TO PHQ9 QUESTIONS 1 & 2: 0
SUM OF ALL RESPONSES TO PHQ QUESTIONS 1-9: 0
2. FEELING DOWN, DEPRESSED OR HOPELESS: 0

## 2023-09-26 ENCOUNTER — TELEPHONE (OUTPATIENT)
Dept: FAMILY MEDICINE CLINIC | Age: 58
End: 2023-09-26

## 2023-09-26 NOTE — TELEPHONE ENCOUNTER
Pt states that Dr. Mahad Chen told him that he needs to schedule an appointment with the Urologist. His company request FMLA form is filled out if he miss more than one day of work.

## 2023-09-27 LAB — BACTERIA UR CULT: NORMAL

## 2023-10-13 ENCOUNTER — OFFICE VISIT (OUTPATIENT)
Dept: FAMILY MEDICINE CLINIC | Age: 58
End: 2023-10-13

## 2023-10-13 VITALS
BODY MASS INDEX: 28.16 KG/M2 | SYSTOLIC BLOOD PRESSURE: 134 MMHG | WEIGHT: 194 LBS | DIASTOLIC BLOOD PRESSURE: 85 MMHG | OXYGEN SATURATION: 98 % | HEART RATE: 69 BPM

## 2023-10-13 DIAGNOSIS — E78.00 PURE HYPERCHOLESTEROLEMIA: Chronic | ICD-10-CM

## 2023-10-13 DIAGNOSIS — J45.20 MILD INTERMITTENT ASTHMA WITHOUT COMPLICATION: Chronic | ICD-10-CM

## 2023-10-13 DIAGNOSIS — E11.21 CONTROLLED TYPE 2 DIABETES MELLITUS WITH DIABETIC NEPHROPATHY, WITHOUT LONG-TERM CURRENT USE OF INSULIN (HCC): Primary | Chronic | ICD-10-CM

## 2023-10-13 DIAGNOSIS — G47.33 OSA (OBSTRUCTIVE SLEEP APNEA): Chronic | ICD-10-CM

## 2023-10-13 DIAGNOSIS — I10 PRIMARY HYPERTENSION: Chronic | ICD-10-CM

## 2023-10-13 RX ORDER — SIMVASTATIN 20 MG
20 TABLET ORAL NIGHTLY
Qty: 90 TABLET | Refills: 3 | Status: SHIPPED | OUTPATIENT
Start: 2023-10-13

## 2023-10-13 NOTE — PROGRESS NOTES
mouth daily 90 tablet 3    albuterol sulfate HFA (PROVENTIL;VENTOLIN;PROAIR) 108 (90 Base) MCG/ACT inhaler Inhale 2 puffs into the lungs every 6 hours as needed for Wheezing 18 g 3    omeprazole (PRILOSEC) 20 MG delayed release capsule Take 1 capsule by mouth every morning (before breakfast) 30 capsule 5    simvastatin (ZOCOR) 20 MG tablet TAKE 1 TABLET BY MOUTH EVERY DAY AT NIGHT 90 tablet 2    fluticasone (FLONASE) 50 MCG/ACT nasal spray 2 sprays by Each Nostril route daily 16 g 5    metFORMIN (GLUCOPHAGE) 500 MG tablet Take 1 tablet by mouth 2 times daily (with meals)      hydrocortisone (ANUSOL-HC) 2.5 % CREA rectal cream Apply to external hemorrhoid twice a day for 2 weeks. 28 g 1    diclofenac sodium (VOLTAREN) 1 % GEL Apply 2 g topically 4 times daily 100 g 2    acetaminophen (TYLENOL) 500 MG tablet Take 2 tablets by mouth every 6 hours as needed for Pain 240 tablet 5    FreeStyle Lancets MISC use one time daily 100 each 0    ibuprofen (IBU) 600 MG tablet Take 1 tablet by mouth every 8 hours as needed for Pain 90 tablet 3    glucose monitoring kit (FREESTYLE) monitoring kit Use to check BS daily 1 kit 0    Chlorphen-Phenyleph-APAP 2-5-325 MG TABS Take 1 tablet by mouth 4 times daily as needed (congestion and cough) 80 tablet 0    Blood Pressure KIT Please dispense one BP kit 1 kit 0     No current facility-administered medications for this visit. No Known Allergies    Social History     Socioeconomic History    Marital status:      Spouse name: Gaby Gomes    Number of children: 3    Years of education: None    Highest education level: None   Tobacco Use    Smoking status: Former     Packs/day: 1.00     Years: 10.00     Additional pack years: 0.00     Total pack years: 10.00     Types: Cigarettes     Quit date: 3/21/1993     Years since quittin.5    Smokeless tobacco: Never   Vaping Use    Vaping Use: Never used   Substance and Sexual Activity    Alcohol use:  Yes     Alcohol/week: 1.0 standard drink

## 2023-11-02 ENCOUNTER — TELEPHONE (OUTPATIENT)
Dept: FAMILY MEDICINE CLINIC | Age: 58
End: 2023-11-02

## 2023-11-02 NOTE — TELEPHONE ENCOUNTER
Patient was given CT cardiac scoring results  He will increase Simvastatin and contact office when he needs new RX for the 40mg

## 2023-11-15 NOTE — ASSESSMENT & PLAN NOTE
Chronic- Stable. Discussed the importance of treating sleep apnea as part of the management of this disorder. Cont any meds per PCP and other physicians. Please call patient to make an appointment

## 2023-12-13 ENCOUNTER — OFFICE VISIT (OUTPATIENT)
Dept: FAMILY MEDICINE CLINIC | Age: 58
End: 2023-12-13
Payer: COMMERCIAL

## 2023-12-13 VITALS
BODY MASS INDEX: 27.78 KG/M2 | SYSTOLIC BLOOD PRESSURE: 134 MMHG | HEART RATE: 69 BPM | DIASTOLIC BLOOD PRESSURE: 78 MMHG | OXYGEN SATURATION: 98 % | WEIGHT: 191.4 LBS

## 2023-12-13 DIAGNOSIS — F43.21 GRIEVING: ICD-10-CM

## 2023-12-13 DIAGNOSIS — R68.89 FORGETFULNESS: ICD-10-CM

## 2023-12-13 DIAGNOSIS — M54.31 BILATERAL SCIATICA: Primary | ICD-10-CM

## 2023-12-13 DIAGNOSIS — M54.32 BILATERAL SCIATICA: Primary | ICD-10-CM

## 2023-12-13 PROCEDURE — 99214 OFFICE O/P EST MOD 30 MIN: CPT | Performed by: NURSE PRACTITIONER

## 2023-12-13 PROCEDURE — 3075F SYST BP GE 130 - 139MM HG: CPT | Performed by: NURSE PRACTITIONER

## 2023-12-13 PROCEDURE — 3078F DIAST BP <80 MM HG: CPT | Performed by: NURSE PRACTITIONER

## 2023-12-13 RX ORDER — SIMVASTATIN 40 MG
40 TABLET ORAL NIGHTLY
Qty: 90 TABLET | Refills: 1 | Status: CANCELLED | OUTPATIENT
Start: 2023-12-13

## 2023-12-13 ASSESSMENT — PATIENT HEALTH QUESTIONNAIRE - PHQ9
SUM OF ALL RESPONSES TO PHQ QUESTIONS 1-9: 1
1. LITTLE INTEREST OR PLEASURE IN DOING THINGS: 0
SUM OF ALL RESPONSES TO PHQ QUESTIONS 1-9: 1
SUM OF ALL RESPONSES TO PHQ9 QUESTIONS 1 & 2: 1
2. FEELING DOWN, DEPRESSED OR HOPELESS: 1

## 2023-12-13 ASSESSMENT — ENCOUNTER SYMPTOMS
ABDOMINAL PAIN: 0
BACK PAIN: 0

## 2023-12-13 NOTE — PROGRESS NOTES
oz)              ASSESSMENT:  1. Bilateral sciatica    2. Grieving    3. Forgetfulness        PLAN:  1. Bilateral sciatica  -     [facility]  Rest, ice and heat  Can take Tyl/Ibu as needed for pain  Will consider imaging as needed    2. Grieving  Recommend counseling. Pt will check with employer for EAP     3. Forgetfulness  Suspect related to aging and/or grieving  Recommend writing down what wife is asking him to do    Return if symptoms worsen or fail to improve. See pt instructions  Discussed use, benefit, and side effects of prescribed medications. All patient questions answered. Pt voiced understanding.

## 2024-01-08 ENCOUNTER — OFFICE VISIT (OUTPATIENT)
Dept: PULMONOLOGY | Age: 59
End: 2024-01-08
Payer: COMMERCIAL

## 2024-01-08 VITALS
OXYGEN SATURATION: 96 % | WEIGHT: 198 LBS | HEIGHT: 69 IN | HEART RATE: 61 BPM | DIASTOLIC BLOOD PRESSURE: 71 MMHG | SYSTOLIC BLOOD PRESSURE: 127 MMHG | BODY MASS INDEX: 29.33 KG/M2

## 2024-01-08 DIAGNOSIS — G47.33 OSA (OBSTRUCTIVE SLEEP APNEA): Primary | ICD-10-CM

## 2024-01-08 DIAGNOSIS — E11.21 CONTROLLED TYPE 2 DIABETES MELLITUS WITH DIABETIC NEPHROPATHY, WITHOUT LONG-TERM CURRENT USE OF INSULIN (HCC): ICD-10-CM

## 2024-01-08 DIAGNOSIS — G47.26 SHIFT WORK SLEEP DISORDER: ICD-10-CM

## 2024-01-08 DIAGNOSIS — I10 PRIMARY HYPERTENSION: ICD-10-CM

## 2024-01-08 PROCEDURE — 3074F SYST BP LT 130 MM HG: CPT | Performed by: NURSE PRACTITIONER

## 2024-01-08 PROCEDURE — 99214 OFFICE O/P EST MOD 30 MIN: CPT | Performed by: NURSE PRACTITIONER

## 2024-01-08 PROCEDURE — 3078F DIAST BP <80 MM HG: CPT | Performed by: NURSE PRACTITIONER

## 2024-01-08 ASSESSMENT — SLEEP AND FATIGUE QUESTIONNAIRES
ESS TOTAL SCORE: 17
HOW LIKELY ARE YOU TO NOD OFF OR FALL ASLEEP WHILE SITTING AND TALKING TO SOMEONE: 2
HOW LIKELY ARE YOU TO NOD OFF OR FALL ASLEEP WHILE WATCHING TV: 3
HOW LIKELY ARE YOU TO NOD OFF OR FALL ASLEEP IN A CAR, WHILE STOPPED FOR A FEW MINUTES IN TRAFFIC: 1
HOW LIKELY ARE YOU TO NOD OFF OR FALL ASLEEP WHILE SITTING QUIETLY AFTER LUNCH WITHOUT ALCOHOL: 2
HOW LIKELY ARE YOU TO NOD OFF OR FALL ASLEEP WHEN YOU ARE A PASSENGER IN A CAR FOR AN HOUR WITHOUT A BREAK: 2
HOW LIKELY ARE YOU TO NOD OFF OR FALL ASLEEP WHILE SITTING AND READING: 3
HOW LIKELY ARE YOU TO NOD OFF OR FALL ASLEEP WHILE LYING DOWN TO REST IN THE AFTERNOON WHEN CIRCUMSTANCES PERMIT: 2
HOW LIKELY ARE YOU TO NOD OFF OR FALL ASLEEP WHILE SITTING INACTIVE IN A PUBLIC PLACE: 2

## 2024-01-08 NOTE — PROGRESS NOTES
Gian Leos CNP  Sandra Trotter CNP [place]  [address]  [address]  [place]  [address]  P- [phone]   Dharmesh  [address]  [address]  [place] [address]  F- [phone]     [facility]  [facility]  [address]  [address]  [facility] [address]  Dept: [phone]  Dept Fax: [phone]  Loc: [phone]      Assessment/Plan:      1. RENA (obstructive sleep apnea)  Assessment & Plan:  Chronic - Stable but question if fully controlled: Reviewed and analyzed results of physiologic download from patient's machine and reviewed with patients. Supplies and parts as needed for the machine. These are medically necessary. Limit caffeine use after 3 PM. Based on the analyzed data, we will make the following changes: EPAP min increased to 13, ramp pressure increased to 10, ramp time 15 min. Would like to increase the ramp pressure a little more but pressure of 10 is the maximum on this machine. He will try with these settings and will let me know if he wants to turn off the ramp feature if he needs more pressure initially. Will also try pressure increase as he is taking his mask off during sleep without realizing he did. Discussed replacing the insert of the mask on a regular basis to ensure proper fit of the mask. Also discussed trial of a mask liner for mask leak. A brochure was provided for reference. Discussed he needs to sleep a little more longer as he is only getting 4-5 hours of sleep total. He will try  to sleep longer on the machine after he returns from dropping his kids off at school, aiming total at least 6-7 hours, ideally 7-8 hours total. He will return in 3 mo for follow up. He was instructed to contact the office if experience new or worsening of symptoms. Encouraged him to use his machine each night, all night. Encouraged the patient to contact the office with any questions or

## 2024-01-08 NOTE — PROGRESS NOTES
Diagnosis: [x] RENA (G47.33) [] CSA (G47.31) [] Apnea (G47.30)   Length of Need: [x] 18 Months [] 99 Months [] Other:   Machine (ALFRED!): [] Respironics Dream Station   2   Auto [] ResMed AirSense     Auto 11 [] Other:     []  CPAP () [] Bilevel ()   Mode: [] Auto [] Spontaneous    Mode: [] Auto [] Spontaneous             Comfort Settings:      Humidifier: [] Heated ()        [x] Water chamber replacement ()/ 1 per 6 months        Mask:   [] Nasal () /1 per 3 months [x] Full Face () /1 per 3 months   [] Patient choice -Size and fit mask [x] Patient Choice - Size and fit mask   [] Dispense: [] Dispense:   [] Headgear () / 1 per 3 months [x] Headgear () / 1 per 3 months   [] Replacement Nasal Cushion ()/2 per month [x] Interface Replacement ()/1 per month   [] Replacement Nasal Pillows ()/2 per month         Tubing: [x] Heated ()/1 per 3 months    [] Standard ()/1 per 3 months [] Other:           Filters: [x] Non-disposable ()/1 per 6 months     [x] Ultra-Fine, Disposable ()/2 per month        Miscellaneous: [] Chin Strap ()/ 1 per 6 months [] O2 bleed-in:        LPM   [] Oxymetry on CPAP/Bilevel [x]  Other: Modem: ()         Start Order Date: 24    MEDICAL JUSTIFICATION:  I, the undersigned, certify that the above prescribed supplies are medically necessary for this patient’s wellbeing.  In my opinion, the supplies are both reasonable and necessary in reference to accepted standards of medicalpractice in treatment of this patient’s condition.    Sandra Trotter CNP    NPI: [id number]     Order Signed Date: 24    Marc Valladares  1965  Marta Guo [address] [place] [address] [phone] (home)   [phone] (mobile)      Insurance Info (confirm with patient if correct):  Payer/Plan Subscr  Sex Relation Sub. Ins. ID Effective Group Num

## 2024-01-08 NOTE — ASSESSMENT & PLAN NOTE
Chronic - Stable but question if fully controlled: Reviewed and analyzed results of physiologic download from patient's machine and reviewed with patients. Supplies and parts as needed for the machine. These are medically necessary. Limit caffeine use after 3 PM. Based on the analyzed data, we will make the following changes: EPAP min increased to 13, ramp pressure increased to 10, ramp time 15 min. Would like to increase the ramp pressure a little more but pressure of 10 is the maximum on this machine. He will try with these settings and will let me know if he wants to turn off the ramp feature if he needs more pressure initially. Will also try pressure increase as he is taking his mask off during sleep without realizing he did. Discussed replacing the insert of the mask on a regular basis to ensure proper fit of the mask. Also discussed trial of a mask liner for mask leak. A brochure was provided for reference. Discussed he needs to sleep a little more longer as he is only getting 4-5 hours of sleep total. He will try  to sleep longer on the machine after he returns from dropping his kids off at school, aiming total at least 6-7 hours, ideally 7-8 hours total. He will return in 3 mo for follow up. He was instructed to contact the office if experience new or worsening of symptoms. Encouraged him to use his machine each night, all night. Encouraged the patient to contact the office with any questions or concerns.    Discussed the importance of consistence use of the machine and encouraged consistent use of the machine each night. Also discussed the importance of treating Obstructive Sleep Apnea from a physiological standpoint. Instructed not to drive unless had 4 hours of effective therapy for RENA the night before. Did review the risks of under or untreated RENA including, but not limited to, higher risks of motor vehicle accidents, stroke, heart attacks, and death. Patients verbalized understanding and accepts all

## 2024-01-08 NOTE — ASSESSMENT & PLAN NOTE
Chronic- Stable.  Discussed the importance of treating sleep apnea as part of the management of this disorder.  Cont any meds per PCP and other physicians.    Will work on sleeping longer on the machine as he is only getting 4-5 hours of sleep. F/u in 3 mo.

## 2024-04-07 ASSESSMENT — SLEEP AND FATIGUE QUESTIONNAIRES
HOW LIKELY ARE YOU TO NOD OFF OR FALL ASLEEP WHEN YOU ARE A PASSENGER IN A CAR FOR AN HOUR WITHOUT A BREAK: SLIGHT CHANCE OF DOZING
HOW LIKELY ARE YOU TO NOD OFF OR FALL ASLEEP WHILE SITTING INACTIVE IN A PUBLIC PLACE: HIGH CHANCE OF DOZING
HOW LIKELY ARE YOU TO NOD OFF OR FALL ASLEEP WHILE SITTING AND READING: HIGH CHANCE OF DOZING
HOW LIKELY ARE YOU TO NOD OFF OR FALL ASLEEP WHILE SITTING AND READING: HIGH CHANCE OF DOZING
HOW LIKELY ARE YOU TO NOD OFF OR FALL ASLEEP WHILE SITTING AND TALKING TO SOMEONE: SLIGHT CHANCE OF DOZING
HOW LIKELY ARE YOU TO NOD OFF OR FALL ASLEEP WHILE SITTING QUIETLY AFTER LUNCH WITHOUT ALCOHOL: HIGH CHANCE OF DOZING
HOW LIKELY ARE YOU TO NOD OFF OR FALL ASLEEP IN A CAR, WHILE STOPPED FOR A FEW MINUTES IN TRAFFIC: MODERATE CHANCE OF DOZING
HOW LIKELY ARE YOU TO NOD OFF OR FALL ASLEEP IN A CAR, WHILE STOPPED FOR A FEW MINUTES IN TRAFFIC: MODERATE CHANCE OF DOZING
HOW LIKELY ARE YOU TO NOD OFF OR FALL ASLEEP WHILE SITTING INACTIVE IN A PUBLIC PLACE: HIGH CHANCE OF DOZING
ESS TOTAL SCORE: 19
HOW LIKELY ARE YOU TO NOD OFF OR FALL ASLEEP WHILE SITTING QUIETLY AFTER LUNCH WITHOUT ALCOHOL: HIGH CHANCE OF DOZING
HOW LIKELY ARE YOU TO NOD OFF OR FALL ASLEEP WHEN YOU ARE A PASSENGER IN A CAR FOR AN HOUR WITHOUT A BREAK: SLIGHT CHANCE OF DOZING
HOW LIKELY ARE YOU TO NOD OFF OR FALL ASLEEP WHILE WATCHING TV: HIGH CHANCE OF DOZING
HOW LIKELY ARE YOU TO NOD OFF OR FALL ASLEEP WHILE LYING DOWN TO REST IN THE AFTERNOON WHEN CIRCUMSTANCES PERMIT: HIGH CHANCE OF DOZING
HOW LIKELY ARE YOU TO NOD OFF OR FALL ASLEEP WHILE LYING DOWN TO REST IN THE AFTERNOON WHEN CIRCUMSTANCES PERMIT: HIGH CHANCE OF DOZING
HOW LIKELY ARE YOU TO NOD OFF OR FALL ASLEEP WHILE SITTING AND TALKING TO SOMEONE: SLIGHT CHANCE OF DOZING
HOW LIKELY ARE YOU TO NOD OFF OR FALL ASLEEP WHILE WATCHING TV: HIGH CHANCE OF DOZING

## 2024-04-10 ENCOUNTER — OFFICE VISIT (OUTPATIENT)
Dept: PULMONOLOGY | Age: 59
End: 2024-04-10
Payer: COMMERCIAL

## 2024-04-10 ENCOUNTER — OFFICE VISIT (OUTPATIENT)
Dept: FAMILY MEDICINE CLINIC | Age: 59
End: 2024-04-10
Payer: COMMERCIAL

## 2024-04-10 VITALS
HEART RATE: 81 BPM | WEIGHT: 194.4 LBS | OXYGEN SATURATION: 98 % | BODY MASS INDEX: 28.71 KG/M2 | SYSTOLIC BLOOD PRESSURE: 146 MMHG | DIASTOLIC BLOOD PRESSURE: 87 MMHG

## 2024-04-10 VITALS
DIASTOLIC BLOOD PRESSURE: 86 MMHG | HEART RATE: 96 BPM | SYSTOLIC BLOOD PRESSURE: 132 MMHG | OXYGEN SATURATION: 97 % | WEIGHT: 194.2 LBS | BODY MASS INDEX: 28.68 KG/M2

## 2024-04-10 DIAGNOSIS — M79.10 MYALGIA: Primary | ICD-10-CM

## 2024-04-10 DIAGNOSIS — E78.00 PURE HYPERCHOLESTEROLEMIA: Chronic | ICD-10-CM

## 2024-04-10 DIAGNOSIS — I10 PRIMARY HYPERTENSION: Chronic | ICD-10-CM

## 2024-04-10 DIAGNOSIS — E11.21 CONTROLLED TYPE 2 DIABETES MELLITUS WITH DIABETIC NEPHROPATHY, WITHOUT LONG-TERM CURRENT USE OF INSULIN (HCC): Chronic | ICD-10-CM

## 2024-04-10 DIAGNOSIS — G47.33 OSA (OBSTRUCTIVE SLEEP APNEA): Primary | Chronic | ICD-10-CM

## 2024-04-10 DIAGNOSIS — M79.10 MYALGIA: ICD-10-CM

## 2024-04-10 LAB
ALBUMIN SERPL-MCNC: 4.2 G/DL (ref 3.4–5)
ALBUMIN/GLOB SERPL: 2.1 {RATIO} (ref 1.1–2.2)
ALP SERPL-CCNC: 66 U/L (ref 40–129)
ALT SERPL-CCNC: 17 U/L (ref 10–40)
ANION GAP SERPL CALCULATED.3IONS-SCNC: 12 MMOL/L (ref 3–16)
AST SERPL-CCNC: 17 U/L (ref 15–37)
BILIRUB SERPL-MCNC: 0.4 MG/DL (ref 0–1)
BUN SERPL-MCNC: 15 MG/DL (ref 7–20)
CALCIUM SERPL-MCNC: 9.1 MG/DL (ref 8.3–10.6)
CHLORIDE SERPL-SCNC: 102 MMOL/L (ref 99–110)
CK SERPL-CCNC: 214 U/L (ref 39–308)
CO2 SERPL-SCNC: 26 MMOL/L (ref 21–32)
CREAT SERPL-MCNC: 0.9 MG/DL (ref 0.9–1.3)
GFR SERPLBLD CREATININE-BSD FMLA CKD-EPI: >90 ML/MIN/{1.73_M2}
GLUCOSE SERPL-MCNC: 197 MG/DL (ref 70–99)
POTASSIUM SERPL-SCNC: 3.6 MMOL/L (ref 3.5–5.1)
PROT SERPL-MCNC: 6.2 G/DL (ref 6.4–8.2)
SODIUM SERPL-SCNC: 140 MMOL/L (ref 136–145)

## 2024-04-10 PROCEDURE — 3079F DIAST BP 80-89 MM HG: CPT | Performed by: NURSE PRACTITIONER

## 2024-04-10 PROCEDURE — 99214 OFFICE O/P EST MOD 30 MIN: CPT | Performed by: NURSE PRACTITIONER

## 2024-04-10 PROCEDURE — G2211 COMPLEX E/M VISIT ADD ON: HCPCS | Performed by: NURSE PRACTITIONER

## 2024-04-10 PROCEDURE — 99213 OFFICE O/P EST LOW 20 MIN: CPT | Performed by: NURSE PRACTITIONER

## 2024-04-10 PROCEDURE — 3077F SYST BP >= 140 MM HG: CPT | Performed by: NURSE PRACTITIONER

## 2024-04-10 PROCEDURE — 3075F SYST BP GE 130 - 139MM HG: CPT | Performed by: NURSE PRACTITIONER

## 2024-04-10 ASSESSMENT — PATIENT HEALTH QUESTIONNAIRE - PHQ9
SUM OF ALL RESPONSES TO PHQ QUESTIONS 1-9: 0
SUM OF ALL RESPONSES TO PHQ9 QUESTIONS 1 & 2: 0
2. FEELING DOWN, DEPRESSED OR HOPELESS: NOT AT ALL
1. LITTLE INTEREST OR PLEASURE IN DOING THINGS: NOT AT ALL
SUM OF ALL RESPONSES TO PHQ QUESTIONS 1-9: 0

## 2024-04-10 ASSESSMENT — ENCOUNTER SYMPTOMS
COUGH: 0
SHORTNESS OF BREATH: 0

## 2024-04-10 NOTE — ASSESSMENT & PLAN NOTE
Chronic-with progression/exacerbation: Reviewed and analyzed results of physiologic download from patient's machine and reviewed with patient.  Supplies and parts as needed for his machine.  These are medically necessary.  Limit caffeine use after 3pm. Based on the analyzed data will change following settings: Humidity decreased to 3.  Discussed machine may not be recording therapeutic time due to large mask leak. Will need to control mask leak to further evaluate if pressure needs adjusted on his machine and/or machine needs replaced. Encouraged him to contact his DME to schedule a mask fitting. Provided him with a sample mask cushion in the office today to use in the interim. Discussed if his insurance will not allow supplies due to compliance, could consider purchasing supplies out of pocket and provided resources to do so. Will see him back in 2-3 months. Encouraged him to contact the office with any questions or concerns.    Encouraged consistent use of his machine each night, all night.  Discussed the importance of treating RENA from a physiological standpoint.  Instructed not to drive unless had 4 hrs of effective therapy for his RENA the night before.  No driving when sleepy.  Did review the risks of under or untreated RENA including, but not limited to, higher risks of motor vehicle accidents, stroke, heart attacks, and death.  He understands and accepts all these risks.

## 2024-04-10 NOTE — PROGRESS NOTES
Gian Leos CNP  Sandra Trotter CNP [place]  [address]  [address]  [place] [address]  P- [phone]  F- [phone]     [facility]  [facility]EP [facility]  [address]  [address]  [facility] [address]  Dept: [phone]  Dept Fax: [phone]  Loc: [phone]      Assessment/Plan:      1. RENA (obstructive sleep apnea)  Assessment & Plan:  Chronic-with progression/exacerbation: Reviewed and analyzed results of physiologic download from patient's machine and reviewed with patient.  Supplies and parts as needed for his machine.  These are medically necessary.  Limit caffeine use after 3pm. Based on the analyzed data will change following settings: Humidity decreased to 3.  Discussed machine may not be recording therapeutic time due to large mask leak. Will need to control mask leak to further evaluate if pressure needs adjusted on his machine and/or machine needs replaced. Encouraged him to contact his DME to schedule a mask fitting. Provided him with a sample mask cushion in the office today to use in the interim. Discussed if his insurance will not allow supplies due to compliance, could consider purchasing supplies out of pocket and provided resources to do so. Will see him back in 2-3 months. Encouraged him to contact the office with any questions or concerns.    Encouraged consistent use of his machine each night, all night.  Discussed the importance of treating RENA from a physiological standpoint.  Instructed not to drive unless had 4 hrs of effective therapy for his RENA the night before.  No driving when sleepy.  Did review the risks of under or untreated RENA including, but not limited to, higher risks of motor vehicle accidents, stroke, heart attacks, and death.  He understands and accepts all these risks.    2. Primary hypertension  Assessment & Plan:  Chronic- Stable.  Discussed the importance of treating

## 2024-04-10 NOTE — PROGRESS NOTES
[phone]    Marc Valladares  1965  [address]  [facility] [address]  [phone] (home)   [phone] (mobile)      Insurance Info (confirm with patient if correct):  Payer/Plan Subscr  Sex Relation Sub. Ins. ID Effective Group Num

## 2024-04-10 NOTE — PROGRESS NOTES
SUBJECTIVE:  Pt is a of 58 y.o. male comes in today with   Chief Complaint   Patient presents with    Pain     Pt states he having left arm pain     Discuss Medications     Simvastatin 40 mg      Presenting today for evaluation of arm pain. Present for approx 6 months. Bilateral upper arm pain, L>R. Cold air causes more pain. Some pain in bilateral elbow. Denies numbness/tingling in arms. Describes pain as cramping and sore. Denies redness or warmth to skin. No localized swelling.   Denies injury.       Prior to Visit Medications    Medication Sig Taking? Authorizing Provider   fluticasone (FLONASE) 50 MCG/ACT nasal spray 1 spray by Each Nostril route daily Yes Sonia Garcia MD   simvastatin (ZOCOR) 20 MG tablet Take 1 tablet by mouth nightly Yes Vincent Sigala MD   metFORMIN (GLUCOPHAGE) 500 MG tablet Take 1 tablet by mouth 2 times daily (with meals) Yes Vincent Sigala MD   fexofenadine (ALLEGRA) 180 MG tablet Take 1 tablet by mouth daily Yes Vincent Sigala MD   blood glucose test strips (FREESTYLE LITE) strip test blood sugar once daily as instructed. Yes Vincent Sigala MD   lisinopril (PRINIVIL;ZESTRIL) 40 MG tablet Take 1 tablet by mouth daily Yes Vincent Sigala MD   albuterol sulfate HFA (PROVENTIL;VENTOLIN;PROAIR) 108 (90 Base) MCG/ACT inhaler Inhale 2 puffs into the lungs every 6 hours as needed for Wheezing Yes Vincent Sigala MD   omeprazole (PRILOSEC) 20 MG delayed release capsule Take 1 capsule by mouth every morning (before breakfast) Yes Vincent Sigala MD   fluticasone (FLONASE) 50 MCG/ACT nasal spray 2 sprays by Each Nostril route daily Yes Vincent Sigala MD   acetaminophen (TYLENOL) 500 MG tablet Take 2 tablets by mouth every 6 hours as needed for Pain Yes Chelsea Rushing   FreeStyle Lancets MISC use one time daily Yes Vincent Sigala MD   ibuprofen (IBU) 600 MG tablet Take 1 tablet by mouth every 8 hours as needed for Pain Yes Emerita Cheng, CHRISTIAN -

## 2024-04-17 RX ORDER — OMEPRAZOLE 20 MG/1
20 CAPSULE, DELAYED RELEASE ORAL
Qty: 90 CAPSULE | Refills: 3 | Status: SHIPPED | OUTPATIENT
Start: 2024-04-17

## 2024-07-07 RX ORDER — LISINOPRIL 40 MG/1
40 TABLET ORAL DAILY
Qty: 90 TABLET | Refills: 3 | Status: CANCELLED | OUTPATIENT
Start: 2024-07-07

## 2024-07-09 DIAGNOSIS — U07.1 COVID: Primary | ICD-10-CM

## 2024-07-09 RX ORDER — LISINOPRIL 40 MG/1
40 TABLET ORAL DAILY
Qty: 90 TABLET | Refills: 3 | Status: SHIPPED | OUTPATIENT
Start: 2024-07-09

## 2024-07-11 ENCOUNTER — TELEPHONE (OUTPATIENT)
Dept: FAMILY MEDICINE CLINIC | Age: 59
End: 2024-07-11

## 2024-07-11 RX ORDER — ALBUTEROL SULFATE 90 UG/1
2 AEROSOL, METERED RESPIRATORY (INHALATION) EVERY 6 HOURS PRN
Qty: 18 G | Refills: 5 | Status: SHIPPED | OUTPATIENT
Start: 2024-07-11

## 2024-07-11 NOTE — TELEPHONE ENCOUNTER
Medication:   Requested Prescriptions     Pending Prescriptions Disp Refills    albuterol sulfate HFA (PROVENTIL;VENTOLIN;PROAIR) 108 (90 Base) MCG/ACT inhaler [Pharmacy Med Name: Albuterol Sulfate HFA Inhalation Aerosol Solution 108 (90 Base) MCG/ACT] 18 g 0     Sig: Inhale 2 puffs into the lungs every 6 hours as needed for Wheezing        Last Filled:  18.3  05.17.2023    Patient Phone Number: [phone] (home)     Last appt: 4/10/2024   Next appt: 7/11/2024    Last OARRS:        No data to display

## 2024-07-11 NOTE — TELEPHONE ENCOUNTER
Medication and Quantity requested:  albuterol sulfate HFA (PROVENTIL;VENTOLIN;PROAIR) 108 (90 Base) MCG/ACT inhaler     Last Visit  04/10/24 - Emerita    Pharmacy and phone number updated in [facility]:  yes    [facility] [place]

## 2024-08-14 ENCOUNTER — OFFICE VISIT (OUTPATIENT)
Dept: PULMONOLOGY | Age: 59
End: 2024-08-14
Payer: COMMERCIAL

## 2024-08-14 VITALS
HEIGHT: 69 IN | WEIGHT: 187.4 LBS | BODY MASS INDEX: 27.76 KG/M2 | SYSTOLIC BLOOD PRESSURE: 144 MMHG | OXYGEN SATURATION: 98 % | DIASTOLIC BLOOD PRESSURE: 94 MMHG | HEART RATE: 90 BPM

## 2024-08-14 DIAGNOSIS — I10 PRIMARY HYPERTENSION: Chronic | ICD-10-CM

## 2024-08-14 DIAGNOSIS — G47.33 OSA (OBSTRUCTIVE SLEEP APNEA): Primary | Chronic | ICD-10-CM

## 2024-08-14 PROCEDURE — 3080F DIAST BP >= 90 MM HG: CPT | Performed by: NURSE PRACTITIONER

## 2024-08-14 PROCEDURE — 3077F SYST BP >= 140 MM HG: CPT | Performed by: NURSE PRACTITIONER

## 2024-08-14 PROCEDURE — G2211 COMPLEX E/M VISIT ADD ON: HCPCS | Performed by: NURSE PRACTITIONER

## 2024-08-14 PROCEDURE — 99214 OFFICE O/P EST MOD 30 MIN: CPT | Performed by: NURSE PRACTITIONER

## 2024-08-14 ASSESSMENT — SLEEP AND FATIGUE QUESTIONNAIRES
HOW LIKELY ARE YOU TO NOD OFF OR FALL ASLEEP WHILE LYING DOWN TO REST IN THE AFTERNOON WHEN CIRCUMSTANCES PERMIT: HIGH CHANCE OF DOZING
HOW LIKELY ARE YOU TO NOD OFF OR FALL ASLEEP WHILE SITTING AND READING: HIGH CHANCE OF DOZING
ESS TOTAL SCORE: 21
HOW LIKELY ARE YOU TO NOD OFF OR FALL ASLEEP WHILE SITTING AND TALKING TO SOMEONE: MODERATE CHANCE OF DOZING
HOW LIKELY ARE YOU TO NOD OFF OR FALL ASLEEP WHILE SITTING QUIETLY AFTER LUNCH WITHOUT ALCOHOL: HIGH CHANCE OF DOZING
HOW LIKELY ARE YOU TO NOD OFF OR FALL ASLEEP IN A CAR, WHILE STOPPED FOR A FEW MINUTES IN TRAFFIC: MODERATE CHANCE OF DOZING
HOW LIKELY ARE YOU TO NOD OFF OR FALL ASLEEP WHILE SITTING INACTIVE IN A PUBLIC PLACE: HIGH CHANCE OF DOZING
HOW LIKELY ARE YOU TO NOD OFF OR FALL ASLEEP WHILE WATCHING TV: HIGH CHANCE OF DOZING
HOW LIKELY ARE YOU TO NOD OFF OR FALL ASLEEP WHEN YOU ARE A PASSENGER IN A CAR FOR AN HOUR WITHOUT A BREAK: MODERATE CHANCE OF DOZING

## 2024-08-14 NOTE — PROGRESS NOTES
Darryn Leos [facility]  [address]  [address]  [place] [address]  P- [phone]  F- [phone]   [facility]ANS [place] [facility]  [address]  [address]  [facility] [address]  Dept: [phone]  Dept Fax: [phone]  Loc: [phone]      Assessment/Plan:      1. RENA (obstructive sleep apnea)  Assessment & Plan:   Chronic-with progression/exacerbation: Reviewed and analyzed results of physiologic download from patient's machine and reviewed with patient.  Supplies and parts as needed for his machine.  These are medically necessary.  Limit caffeine use after 3pm. Based on the analyzed data will continue with current settings.  Encouraged him to restart his machine and use as much as possible.  Provided him with a sample headgear and mask cushion in the office today.  Encouraged him to contact [facility] to schedule a mask fitting and place an order for new supplies.  Will see him back in 2-3 months.  Encouraged him to contact the office with any questions or concerns.      Encouraged consistent use of his machine each night, all night.  Discussed the importance of treating RENA from a physiological standpoint.  Instructed not to drive unless had 4 hrs of effective therapy for his RENA the night before.  No driving when sleepy.  Did review the risks of under or untreated RENA including, but not limited to, higher risks of motor vehicle accidents, stroke, heart attacks, and death.  He understands and accepts all these risks.    2. Primary hypertension  Assessment & Plan:  Chronic- Stable.  Discussed the importance of treating obstructive sleep apnea as part of the management of this disorder.  Cont any meds per PCP and other physicians.        Reviewed, analyzed, and documented physiologic data from patient's PAP machine.    This information was analyzed to assess complexity and medical decision making in regards to

## 2024-08-14 NOTE — ASSESSMENT & PLAN NOTE
Chronic-with progression/exacerbation: Reviewed and analyzed results of physiologic download from patient's machine and reviewed with patient.  Supplies and parts as needed for his machine.  These are medically necessary.  Limit caffeine use after 3pm. Based on the analyzed data will continue with current settings.  Encouraged him to restart his machine and use as much as possible.  Provided him with a sample headgear and mask cushion in the office today.  Encouraged him to contact [facility] to schedule a mask fitting and place an order for new supplies.  Will see him back in 2-3 months.  Encouraged him to contact the office with any questions or concerns.        Encouraged consistent use of his machine each night, all night.  Discussed the importance of treating RENA from a physiological standpoint.  Instructed not to drive unless had 4 hrs of effective therapy for his RENA the night before.  No driving when sleepy.  Did review the risks of under or untreated RENA including, but not limited to, higher risks of motor vehicle accidents, stroke, heart attacks, and death.  He understands and accepts all these risks.

## 2024-08-14 NOTE — PROGRESS NOTES
Diagnosis: [x] RENA (G47.33) [] CSA (G47.31) [] Apnea (G47.30)   Length of Need: [x] 15 Months [] 99 Months [] Other:   Machine (ALFRED!): [] Respironics Dream Station      Auto [] ResMed AirSense     Auto [] Other:     []  CPAP () [] Bilevel ()   Mode: [] Auto [] Spontaneous    Mode: [] Auto [] Spontaneous             Comfort Settings:      Humidifier: [] Heated ()        [x] Water chamber replacement ()/ 1 per 6 months        Mask:   [] Nasal () /1 per 3 months [x] Full Face () /1 per 3 months   [] Patient choice -Size and fit mask [x] Patient Choice - Size and fit mask   [] Dispense: [] Dispense:   [] Headgear () / 1 per 3 months [x] Headgear () / 1 per 3 months   [] Replacement Nasal Cushion ()/2 per month [x] Interface Replacement ()/1 per month   [] Replacement Nasal Pillows ()/2 per month         Tubing: [x] Heated ()/1 per 3 months    [] Standard ()/1 per 3 months [] Other:           Filters: [x] Non-disposable ()/1 per 6 months     [x] Ultra-Fine, Disposable ()/2 per month        Miscellaneous: [] Chin Strap ()/ 1 per 6 months [] O2 bleed-in:        LPM   [] Oxymetry on CPAP/Bilevel [x]  Other: Mask re-fit        Start Order Date: 08/14/24    MEDICAL JUSTIFICATION:  I, the undersigned, certify that the above prescribed supplies are medically necessary for this patient’s wellbeing.  In my opinion, the supplies are both reasonable and necessary in reference to accepted standards of medicalpractice in treatment of this patient’s condition.    ZOEY WRIGHT NP    NPI: [id number]       Order Signed Date: 08/14/24  [facility]  Pulmonary, Sleep, and Critical Care    Pulmonary, Sleep, and Critical Care  [facility]                          [address] [place] [address], [address]  [place] [address]                                    [place] [address]  Phone: [phone]    Fax:

## 2024-08-22 ENCOUNTER — TELEMEDICINE (OUTPATIENT)
Dept: FAMILY MEDICINE CLINIC | Age: 59
End: 2024-08-22
Payer: COMMERCIAL

## 2024-08-22 DIAGNOSIS — J06.9 PROTRACTED URI: Primary | ICD-10-CM

## 2024-08-22 PROCEDURE — 99213 OFFICE O/P EST LOW 20 MIN: CPT | Performed by: NURSE PRACTITIONER

## 2024-08-22 RX ORDER — AZITHROMYCIN 250 MG/1
250 TABLET, FILM COATED ORAL SEE ADMIN INSTRUCTIONS
Qty: 6 TABLET | Refills: 0 | Status: SHIPPED | OUTPATIENT
Start: 2024-08-22 | End: 2024-08-27

## 2024-08-22 ASSESSMENT — ENCOUNTER SYMPTOMS
SORE THROAT: 1
WHEEZING: 0
SHORTNESS OF BREATH: 0
CHEST TIGHTNESS: 0
SINUS PRESSURE: 1
COUGH: 1

## 2024-08-22 ASSESSMENT — PATIENT HEALTH QUESTIONNAIRE - PHQ9
1. LITTLE INTEREST OR PLEASURE IN DOING THINGS: NOT AT ALL
SUM OF ALL RESPONSES TO PHQ QUESTIONS 1-9: 0
2. FEELING DOWN, DEPRESSED OR HOPELESS: NOT AT ALL
SUM OF ALL RESPONSES TO PHQ QUESTIONS 1-9: 0
SUM OF ALL RESPONSES TO PHQ9 QUESTIONS 1 & 2: 0

## 2024-08-22 NOTE — PROGRESS NOTES
Date of Visit:  2024    CC: Marc Valladares (: 1965) is a 58 y.o. male, established patient, here for evaluation/re-evaluation of the following medical concerns:    ASSESSMENT/PLAN:  Protracted URI  -     azithromycin (ZITHROMAX) 250 MG tablet; Take 1 tablet by mouth See Admin Instructions for 5 days 500mg on day 1 followed by 250mg on days 2 - 5, Disp-6 tablet, R-0Normal  Symptomatic treatment: Tylenol / Advil, rest, salt water gargles, increase fluids.  May also use: antihistamine-decongestant of choice.      Return if symptoms worsen or fail to improve.  See pt instructions  Discussed use, benefit, and side effects of prescribed medications. All patient questions answered.  Pt voiced understanding.           2024     5:29 PM   Patient-Reported Vitals   Patient-Reported Weight 187   Patient-Reported Height 5'8\"        Wt Readings from Last 3 Encounters:   24 85 kg (187 lb 6.4 oz)   04/10/24 88.2 kg (194 lb 6.4 oz)   04/10/24 88.1 kg (194 lb 3.2 oz)     BP Readings from Last 3 Encounters:   24 (!) 144/94   04/10/24 (!) 146/87   04/10/24 132/86     Estimated body mass index is 27.67 kg/m² as calculated from the following:    Height as of 24: 1.753 m (5' 9\").    Weight as of 24: 85 kg (187 lb 6.4 oz).    HPI  URI   This is a new problem. The current episode started 1 to 4 weeks ago (7 days ago). The problem has been unchanged. There has been no fever. Associated symptoms include congestion, coughing (to clear pnd), headaches (sinus pressure) and a sore throat (throat tingling). Pertinent negatives include no ear pain or wheezing. Treatments tried: Generic decongestant, albuterol inhaler prn- that is helpful. The treatment provided mild relief.         Review of Systems   Constitutional:  Positive for fatigue. Negative for fever.   HENT:  Positive for congestion, postnasal drip, sinus pressure (frontal) and sore throat (throat tingling). Negative for ear pain.    Respiratory:

## 2024-08-30 RX ORDER — FEXOFENADINE HCL 180 MG/1
180 TABLET ORAL DAILY
Qty: 30 TABLET | Refills: 3 | Status: SHIPPED | OUTPATIENT
Start: 2024-08-30

## 2024-08-30 NOTE — TELEPHONE ENCOUNTER
Medication:   Requested Prescriptions     Pending Prescriptions Disp Refills    fexofenadine (ALLEGRA) 180 MG tablet [Pharmacy Med Name: Fexofenadine HCl Oral Tablet 180 MG] 30 tablet 0     Sig: TAKE 1 TABLET BY MOUTH DAILY        Last Filled:  08.29.2023    Patient Phone Number: [phone] (home)     Last appt: 8/22/2024   Next appt: Visit date not found    Last OARRS:        No data to display

## 2024-09-08 SDOH — ECONOMIC STABILITY: INCOME INSECURITY: HOW HARD IS IT FOR YOU TO PAY FOR THE VERY BASICS LIKE FOOD, HOUSING, MEDICAL CARE, AND HEATING?: SOMEWHAT HARD

## 2024-09-08 SDOH — ECONOMIC STABILITY: FOOD INSECURITY: WITHIN THE PAST 12 MONTHS, THE FOOD YOU BOUGHT JUST DIDN'T LAST AND YOU DIDN'T HAVE MONEY TO GET MORE.: NEVER TRUE

## 2024-09-08 SDOH — ECONOMIC STABILITY: FOOD INSECURITY: WITHIN THE PAST 12 MONTHS, YOU WORRIED THAT YOUR FOOD WOULD RUN OUT BEFORE YOU GOT MONEY TO BUY MORE.: SOMETIMES TRUE

## 2024-09-08 SDOH — ECONOMIC STABILITY: TRANSPORTATION INSECURITY
IN THE PAST 12 MONTHS, HAS LACK OF TRANSPORTATION KEPT YOU FROM MEETINGS, WORK, OR FROM GETTING THINGS NEEDED FOR DAILY LIVING?: NO

## 2024-09-11 ENCOUNTER — OFFICE VISIT (OUTPATIENT)
Dept: FAMILY MEDICINE CLINIC | Age: 59
End: 2024-09-11
Payer: COMMERCIAL

## 2024-09-11 VITALS
SYSTOLIC BLOOD PRESSURE: 136 MMHG | WEIGHT: 191.8 LBS | BODY MASS INDEX: 28.32 KG/M2 | HEART RATE: 92 BPM | OXYGEN SATURATION: 98 % | DIASTOLIC BLOOD PRESSURE: 88 MMHG

## 2024-09-11 DIAGNOSIS — J30.9 ALLERGIC RHINITIS, UNSPECIFIED SEASONALITY, UNSPECIFIED TRIGGER: Chronic | ICD-10-CM

## 2024-09-11 DIAGNOSIS — Z12.5 SCREENING FOR PROSTATE CANCER: ICD-10-CM

## 2024-09-11 DIAGNOSIS — I10 PRIMARY HYPERTENSION: Chronic | ICD-10-CM

## 2024-09-11 DIAGNOSIS — G47.33 OSA (OBSTRUCTIVE SLEEP APNEA): Chronic | ICD-10-CM

## 2024-09-11 DIAGNOSIS — E78.00 PURE HYPERCHOLESTEROLEMIA: Chronic | ICD-10-CM

## 2024-09-11 DIAGNOSIS — E11.21 CONTROLLED TYPE 2 DIABETES MELLITUS WITH DIABETIC NEPHROPATHY, WITHOUT LONG-TERM CURRENT USE OF INSULIN (HCC): Primary | ICD-10-CM

## 2024-09-11 DIAGNOSIS — E11.21 CONTROLLED TYPE 2 DIABETES MELLITUS WITH DIABETIC NEPHROPATHY, WITHOUT LONG-TERM CURRENT USE OF INSULIN (HCC): ICD-10-CM

## 2024-09-11 DIAGNOSIS — J45.20 MILD INTERMITTENT ASTHMA WITHOUT COMPLICATION: Chronic | ICD-10-CM

## 2024-09-11 PROBLEM — L03.011 CELLULITIS OF FINGER OF RIGHT HAND: Status: RESOLVED | Noted: 2017-05-24 | Resolved: 2024-09-11

## 2024-09-11 LAB
ALBUMIN SERPL-MCNC: 4.2 G/DL (ref 3.4–5)
ALBUMIN/GLOB SERPL: 2 {RATIO} (ref 1.1–2.2)
ALP SERPL-CCNC: 77 U/L (ref 40–129)
ALT SERPL-CCNC: 23 U/L (ref 10–40)
ANION GAP SERPL CALCULATED.3IONS-SCNC: 11 MMOL/L (ref 3–16)
AST SERPL-CCNC: 18 U/L (ref 15–37)
BASOPHILS # BLD: 0 K/UL (ref 0–0.2)
BASOPHILS NFR BLD: 0.7 %
BILIRUB SERPL-MCNC: 0.8 MG/DL (ref 0–1)
BUN SERPL-MCNC: 12 MG/DL (ref 7–20)
CALCIUM SERPL-MCNC: 9.3 MG/DL (ref 8.3–10.6)
CHLORIDE SERPL-SCNC: 105 MMOL/L (ref 99–110)
CHOLEST SERPL-MCNC: 198 MG/DL (ref 0–199)
CO2 SERPL-SCNC: 25 MMOL/L (ref 21–32)
CREAT SERPL-MCNC: 0.8 MG/DL (ref 0.9–1.3)
CREATININE URINE POCT: 50
DEPRECATED RDW RBC AUTO: 13.5 % (ref 12.4–15.4)
EOSINOPHIL # BLD: 0.3 K/UL (ref 0–0.6)
EOSINOPHIL NFR BLD: 5.4 %
GFR SERPLBLD CREATININE-BSD FMLA CKD-EPI: >90 ML/MIN/{1.73_M2}
GLUCOSE P FAST SERPL-MCNC: 136 MG/DL (ref 70–99)
HBA1C MFR BLD: 7.5 %
HCT VFR BLD AUTO: 44.1 % (ref 40.5–52.5)
HDLC SERPL-MCNC: 38 MG/DL (ref 40–60)
HGB BLD-MCNC: 14.7 G/DL (ref 13.5–17.5)
LDL CHOLESTEROL: 120 MG/DL
LYMPHOCYTES # BLD: 1.8 K/UL (ref 1–5.1)
LYMPHOCYTES NFR BLD: 32.1 %
MCH RBC QN AUTO: 31 PG (ref 26–34)
MCHC RBC AUTO-ENTMCNC: 33.3 G/DL (ref 31–36)
MCV RBC AUTO: 93.1 FL (ref 80–100)
MICROALBUMIN/CREAT 24H UR: 30 MG/DL
MICROALBUMIN/CREAT UR-RTO: NORMAL MG/G
MONOCYTES # BLD: 0.5 K/UL (ref 0–1.3)
MONOCYTES NFR BLD: 8.6 %
NEUTROPHILS # BLD: 3 K/UL (ref 1.7–7.7)
NEUTROPHILS NFR BLD: 53.2 %
PLATELET # BLD AUTO: 215 K/UL (ref 135–450)
PMV BLD AUTO: 8.8 FL (ref 5–10.5)
POTASSIUM SERPL-SCNC: 3.8 MMOL/L (ref 3.5–5.1)
PROT SERPL-MCNC: 6.3 G/DL (ref 6.4–8.2)
PSA SERPL DL<=0.01 NG/ML-MCNC: 0.34 NG/ML (ref 0–4)
RBC # BLD AUTO: 4.73 M/UL (ref 4.2–5.9)
SODIUM SERPL-SCNC: 141 MMOL/L (ref 136–145)
TRIGL SERPL-MCNC: 200 MG/DL (ref 0–150)
TSH SERPL DL<=0.005 MIU/L-ACNC: 1.34 UIU/ML (ref 0.27–4.2)
VLDLC SERPL CALC-MCNC: 40 MG/DL
WBC # BLD AUTO: 5.6 K/UL (ref 4–11)

## 2024-09-11 PROCEDURE — 82044 UR ALBUMIN SEMIQUANTITATIVE: CPT | Performed by: NURSE PRACTITIONER

## 2024-09-11 PROCEDURE — 3079F DIAST BP 80-89 MM HG: CPT | Performed by: NURSE PRACTITIONER

## 2024-09-11 PROCEDURE — 83036 HEMOGLOBIN GLYCOSYLATED A1C: CPT | Performed by: NURSE PRACTITIONER

## 2024-09-11 PROCEDURE — 3051F HG A1C>EQUAL 7.0%<8.0%: CPT | Performed by: NURSE PRACTITIONER

## 2024-09-11 PROCEDURE — 99214 OFFICE O/P EST MOD 30 MIN: CPT | Performed by: NURSE PRACTITIONER

## 2024-09-11 PROCEDURE — 3075F SYST BP GE 130 - 139MM HG: CPT | Performed by: NURSE PRACTITIONER

## 2024-09-11 RX ORDER — FLUTICASONE PROPIONATE 50 MCG
1 SPRAY, SUSPENSION (ML) NASAL DAILY
Qty: 32 G | Refills: 1 | Status: SHIPPED | OUTPATIENT
Start: 2024-09-11

## 2024-09-11 ASSESSMENT — PATIENT HEALTH QUESTIONNAIRE - PHQ9
1. LITTLE INTEREST OR PLEASURE IN DOING THINGS: NOT AT ALL
2. FEELING DOWN, DEPRESSED OR HOPELESS: NOT AT ALL
SUM OF ALL RESPONSES TO PHQ9 QUESTIONS 1 & 2: 0
SUM OF ALL RESPONSES TO PHQ QUESTIONS 1-9: 0

## 2024-09-11 ASSESSMENT — ENCOUNTER SYMPTOMS
NAUSEA: 0
RHINORRHEA: 1
ABDOMINAL PAIN: 0
DIARRHEA: 0
SORE THROAT: 0
CHEST TIGHTNESS: 0
COUGH: 1
WHEEZING: 0
VOMITING: 0
SHORTNESS OF BREATH: 0

## 2024-09-12 DIAGNOSIS — E78.00 PURE HYPERCHOLESTEROLEMIA: Primary | ICD-10-CM

## 2024-09-12 RX ORDER — ROSUVASTATIN CALCIUM 10 MG/1
10 TABLET, COATED ORAL DAILY
Qty: 90 TABLET | Refills: 1 | Status: SHIPPED | OUTPATIENT
Start: 2024-09-12

## 2024-10-16 ENCOUNTER — TELEPHONE (OUTPATIENT)
Dept: FAMILY MEDICINE CLINIC | Age: 59
End: 2024-10-16

## 2024-10-16 NOTE — TELEPHONE ENCOUNTER
Signature Certification of Routine Physical Examination for Patient    Doctor Signature Required.    Original Document placed in Doctor Mail Slot at the front window.    Pls contact the patient when ready for .

## 2024-11-25 NOTE — TELEPHONE ENCOUNTER
Lov 9/11/24  Lrf 180 3 10/13/23 Medication:   Requested Prescriptions     Pending Prescriptions Disp Refills    metFORMIN (GLUCOPHAGE) 500 MG tablet [Pharmacy Med Name: metFORMIN HCl Oral Tablet 500 MG] 180 tablet 0     Sig: TAKE 1 TABLET BY MOUTH 2 TIMES A DAY WITH MEALS       Last Filled:      Patient Phone Number: [phone] (home)     Last appt: 9/11/2024   Next appt: 12/11/2024    Last Labs DM:   Lab Results   Component Value Date/Time    LABA1C 7.5 09/11/2024 11:34 AM

## 2024-12-11 ENCOUNTER — OFFICE VISIT (OUTPATIENT)
Dept: FAMILY MEDICINE CLINIC | Age: 59
End: 2024-12-11

## 2024-12-11 VITALS
DIASTOLIC BLOOD PRESSURE: 79 MMHG | BODY MASS INDEX: 28.21 KG/M2 | HEART RATE: 94 BPM | SYSTOLIC BLOOD PRESSURE: 121 MMHG | WEIGHT: 191 LBS

## 2024-12-11 DIAGNOSIS — E78.00 PURE HYPERCHOLESTEROLEMIA: Chronic | ICD-10-CM

## 2024-12-11 DIAGNOSIS — Z80.9 FAMILY HISTORY OF CANCER: ICD-10-CM

## 2024-12-11 DIAGNOSIS — J45.20 MILD INTERMITTENT ASTHMA WITHOUT COMPLICATION: Chronic | ICD-10-CM

## 2024-12-11 DIAGNOSIS — E66.9 OBESITY (BMI 30-39.9): ICD-10-CM

## 2024-12-11 DIAGNOSIS — E11.21 CONTROLLED TYPE 2 DIABETES MELLITUS WITH DIABETIC NEPHROPATHY, WITHOUT LONG-TERM CURRENT USE OF INSULIN (HCC): Primary | Chronic | ICD-10-CM

## 2024-12-11 DIAGNOSIS — G47.33 OSA (OBSTRUCTIVE SLEEP APNEA): Chronic | ICD-10-CM

## 2024-12-11 DIAGNOSIS — I10 PRIMARY HYPERTENSION: Chronic | ICD-10-CM

## 2024-12-11 NOTE — PROGRESS NOTES
Hemoglobin A1C   Date Value Ref Range Status   09/11/2024 7.5 % Final      Lab Results   Component Value Date/Time     09/11/2024 12:11 PM    K 3.8 09/11/2024 12:11 PM     09/11/2024 12:11 PM    CO2 25 09/11/2024 12:11 PM    BUN 12 09/11/2024 12:11 PM    CREATININE 0.8 09/11/2024 12:11 PM    GLUCOSE 197 04/10/2024 10:14 AM    CALCIUM 9.3 09/11/2024 12:11 PM    LABGLOM >90 09/11/2024 12:11 PM    LABGLOM >90 04/10/2024 10:14 AM    Today A1c - 6.6  ASSESSMENT/PLAN:  1. Controlled type 2 diabetes mellitus with diabetic nephropathy, without long-term current use of insulin (HCC)  Improved, continue metformin 500 twice daily  More exercise    2. Obesity (BMI 30-39.9)  Slowly improving  More exercise, urged 150 minutes of brisk walking per week as a minimum    3. Pure hypercholesterolemia  stable, continue Crestor    4. Mild intermittent asthma without complication  Stable on prn albultrerol    5. RENA (obstructive sleep apnea)  CPAP compliant  Further weight loss    6. Primary hypertension  stable, continue lisinopril    7 immunizations/health maintenance  Urged flu, COVID    8 fairly strong history for cancer  Refer to [facility] for genetic testing

## 2025-02-02 ASSESSMENT — SLEEP AND FATIGUE QUESTIONNAIRES
HOW LIKELY ARE YOU TO NOD OFF OR FALL ASLEEP WHILE SITTING INACTIVE IN A PUBLIC PLACE: HIGH CHANCE OF DOZING
HOW LIKELY ARE YOU TO NOD OFF OR FALL ASLEEP WHILE SITTING QUIETLY AFTER LUNCH WITHOUT ALCOHOL: MODERATE CHANCE OF DOZING
HOW LIKELY ARE YOU TO NOD OFF OR FALL ASLEEP WHILE LYING DOWN TO REST IN THE AFTERNOON WHEN CIRCUMSTANCES PERMIT: HIGH CHANCE OF DOZING
HOW LIKELY ARE YOU TO NOD OFF OR FALL ASLEEP WHILE SITTING AND READING: HIGH CHANCE OF DOZING
HOW LIKELY ARE YOU TO NOD OFF OR FALL ASLEEP WHILE SITTING INACTIVE IN A PUBLIC PLACE: HIGH CHANCE OF DOZING
HOW LIKELY ARE YOU TO NOD OFF OR FALL ASLEEP WHILE LYING DOWN TO REST IN THE AFTERNOON WHEN CIRCUMSTANCES PERMIT: HIGH CHANCE OF DOZING
HOW LIKELY ARE YOU TO NOD OFF OR FALL ASLEEP WHILE SITTING AND READING: HIGH CHANCE OF DOZING
HOW LIKELY ARE YOU TO NOD OFF OR FALL ASLEEP WHILE SITTING AND TALKING TO SOMEONE: MODERATE CHANCE OF DOZING
HOW LIKELY ARE YOU TO NOD OFF OR FALL ASLEEP WHILE SITTING AND TALKING TO SOMEONE: MODERATE CHANCE OF DOZING
HOW LIKELY ARE YOU TO NOD OFF OR FALL ASLEEP IN A CAR, WHILE STOPPED FOR A FEW MINUTES IN TRAFFIC: MODERATE CHANCE OF DOZING
HOW LIKELY ARE YOU TO NOD OFF OR FALL ASLEEP IN A CAR, WHILE STOPPED FOR A FEW MINUTES IN TRAFFIC: MODERATE CHANCE OF DOZING
HOW LIKELY ARE YOU TO NOD OFF OR FALL ASLEEP WHILE WATCHING TV: HIGH CHANCE OF DOZING
HOW LIKELY ARE YOU TO NOD OFF OR FALL ASLEEP WHEN YOU ARE A PASSENGER IN A CAR FOR AN HOUR WITHOUT A BREAK: HIGH CHANCE OF DOZING
HOW LIKELY ARE YOU TO NOD OFF OR FALL ASLEEP WHILE SITTING QUIETLY AFTER LUNCH WITHOUT ALCOHOL: MODERATE CHANCE OF DOZING
ESS TOTAL SCORE: 21
HOW LIKELY ARE YOU TO NOD OFF OR FALL ASLEEP WHILE WATCHING TV: HIGH CHANCE OF DOZING
HOW LIKELY ARE YOU TO NOD OFF OR FALL ASLEEP WHEN YOU ARE A PASSENGER IN A CAR FOR AN HOUR WITHOUT A BREAK: HIGH CHANCE OF DOZING

## 2025-02-03 RX ORDER — LISINOPRIL 40 MG/1
40 TABLET ORAL DAILY
Qty: 90 TABLET | Refills: 3 | Status: SHIPPED | OUTPATIENT
Start: 2025-02-03 | End: 2025-02-03 | Stop reason: SDUPTHER

## 2025-02-03 RX ORDER — LISINOPRIL 40 MG/1
40 TABLET ORAL DAILY
Qty: 90 TABLET | Refills: 3 | Status: SHIPPED | OUTPATIENT
Start: 2025-02-03

## 2025-02-03 NOTE — TELEPHONE ENCOUNTER
[facility]    Medication:   Requested Prescriptions     Pending Prescriptions Disp Refills    lisinopril (PRINIVIL;ZESTRIL) 40 MG tablet 90 tablet 3     Sig: Take 1 tablet by mouth daily       Last Filled:  2/3/25     Patient Phone Number: [phone] (home)     Last appt: 12/11/2024   Next appt: Visit date not found    Lab Results   Component Value Date     09/11/2024    K 3.8 09/11/2024     09/11/2024    CO2 25 09/11/2024    BUN 12 09/11/2024    CREATININE 0.8 (L) 09/11/2024    GLUCOSE 197 (H) 04/10/2024    CALCIUM 9.3 09/11/2024    BILITOT 0.8 09/11/2024    ALKPHOS 77 09/11/2024    AST 18 09/11/2024    ALT 23 09/11/2024    LABGLOM >90 09/11/2024    GFRAA >60 01/19/2022    AGRATIO 2.0 09/11/2024    GLOB 2.2 11/25/2019

## 2025-02-03 NOTE — TELEPHONE ENCOUNTER
Lov 12/1/24  Lrf 90 3 7/9/24 Medication:   Requested Prescriptions     Pending Prescriptions Disp Refills    lisinopril (PRINIVIL;ZESTRIL) 40 MG tablet [Pharmacy Med Name: Lisinopril Oral Tablet 40 MG] 90 tablet 0     Sig: TAKE 1 TABLET BY MOUTH EVERY DAY       Last Filled:      Patient Phone Number: [phone] (home)     Last appt: 12/11/2024   Next appt: Visit date not found    Lab Results   Component Value Date     09/11/2024    K 3.8 09/11/2024     09/11/2024    CO2 25 09/11/2024    BUN 12 09/11/2024    CREATININE 0.8 (L) 09/11/2024    GLUCOSE 197 (H) 04/10/2024    CALCIUM 9.3 09/11/2024    BILITOT 0.8 09/11/2024    ALKPHOS 77 09/11/2024    AST 18 09/11/2024    ALT 23 09/11/2024    LABGLOM >90 09/11/2024    GFRAA >60 01/19/2022    AGRATIO 2.0 09/11/2024    GLOB 2.2 11/25/2019

## 2025-02-03 NOTE — TELEPHONE ENCOUNTER
Medication and Quantity requested:        lisinopril (PRINIVIL;ZESTRIL) 40 MG tablet [[id number]]     Last Visit  12/11/2024    Pharmacy and phone number updated in EPIC:  yes    Adrianne Astorg[address] dr

## 2025-02-05 ENCOUNTER — OFFICE VISIT (OUTPATIENT)
Dept: PULMONOLOGY | Age: 60
End: 2025-02-05
Payer: COMMERCIAL

## 2025-02-05 VITALS
HEIGHT: 69 IN | WEIGHT: 196 LBS | OXYGEN SATURATION: 97 % | HEART RATE: 114 BPM | DIASTOLIC BLOOD PRESSURE: 74 MMHG | SYSTOLIC BLOOD PRESSURE: 132 MMHG | BODY MASS INDEX: 29.03 KG/M2

## 2025-02-05 DIAGNOSIS — E11.21 CONTROLLED TYPE 2 DIABETES MELLITUS WITH DIABETIC NEPHROPATHY, WITHOUT LONG-TERM CURRENT USE OF INSULIN (HCC): Chronic | ICD-10-CM

## 2025-02-05 DIAGNOSIS — G47.33 OSA (OBSTRUCTIVE SLEEP APNEA): Primary | Chronic | ICD-10-CM

## 2025-02-05 DIAGNOSIS — I10 PRIMARY HYPERTENSION: Chronic | ICD-10-CM

## 2025-02-05 PROCEDURE — 3075F SYST BP GE 130 - 139MM HG: CPT | Performed by: NURSE PRACTITIONER

## 2025-02-05 PROCEDURE — 99214 OFFICE O/P EST MOD 30 MIN: CPT | Performed by: NURSE PRACTITIONER

## 2025-02-05 PROCEDURE — G2211 COMPLEX E/M VISIT ADD ON: HCPCS | Performed by: NURSE PRACTITIONER

## 2025-02-05 PROCEDURE — 3078F DIAST BP <80 MM HG: CPT | Performed by: NURSE PRACTITIONER

## 2025-02-05 NOTE — PROGRESS NOTES
Diagnosis: [x] RENA (G47.33) [] CSA (G47.31) [] Apnea (G47.30)   Length of Need: [x] 15 Months [] 99 Months [] Other:   Machine (ALFRED!): [] Respironics Dream Station      Auto [] ResMed AirSense     Auto [] Other:     []  CPAP () [] Bilevel ()   Mode: [] Auto [] Spontaneous    Mode: [] Auto [] Spontaneous             Comfort Settings:      Humidifier: [] Heated ()        [x] Water chamber replacement ()/ 1 per 6 months        Mask:   [] Nasal () /1 per 3 months [x] Full Face () /1 per 3 months   [] Patient choice -Size and fit mask [x] Patient Choice - Size and fit mask   [] Dispense: [] Dispense:   [] Headgear () / 1 per 3 months [x] Headgear () / 1 per 3 months   [] Replacement Nasal Cushion ()/2 per month [x] Interface Replacement ()/1 per month   [] Replacement Nasal Pillows ()/2 per month         Tubing: [x] Heated ()/1 per 3 months    [] Standard ()/1 per 3 months [] Other:           Filters: [x] Non-disposable ()/1 per 6 months     [x] Ultra-Fine, Disposable ()/2 per month        Miscellaneous: [] Chin Strap ()/ 1 per 6 months [] O2 bleed-in:        LPM   [] Oxymetry on CPAP/Bilevel []  Other:         Start Order Date: 02/05/25    MEDICAL JUSTIFICATION:  I, the undersigned, certify that the above prescribed supplies are medically necessary for this patient’s wellbeing.  In my opinion, the supplies are both reasonable and necessary in reference to accepted standards of medicalpractice in treatment of this patient’s condition.    ZOEY WRIGHT NP    NPI: [id number]       Order Signed Date: 02/05/25  [facility]  Pulmonary, Sleep, and Critical Care    Pulmonary, Sleep, and Critical Care  [facility] [address]                          [address] [place] [address] [address]  [place] [address]                                    [place] [address]  Phone: [phone]    Fax: 
0 = independent

## 2025-02-05 NOTE — PROGRESS NOTES
Medications   Medication Instructions    acetaminophen (TYLENOL) 1,000 mg, Oral, EVERY 6 HOURS PRN    albuterol sulfate HFA (PROVENTIL;VENTOLIN;PROAIR) 108 (90 Base) MCG/ACT inhaler 2 puffs, Inhalation, EVERY 6 HOURS PRN    blood glucose test strips (FREESTYLE LITE) strip test blood sugar once daily as instructed.    Blood Pressure KIT Please dispense one BP kit    FreeStyle Lancets MISC use one time daily    glucose monitoring kit (FREESTYLE) monitoring kit Use to check BS daily    ibuprofen (IBU) 600 mg, Oral, EVERY 8 HOURS PRN    lisinopril (PRINIVIL;ZESTRIL) 40 mg, Oral, DAILY    metFORMIN (GLUCOPHAGE) 500 mg, Oral, 2 TIMES DAILY WITH MEALS    omeprazole (PRILOSEC) 20 mg, Oral, DAILY BEFORE BREAKFAST    rosuvastatin (CRESTOR) 10 mg, Oral, DAILY        Objective   Vitals:  Weight BMI   Wt Readings from Last 3 Encounters:   02/05/25 88.9 kg (196 lb)   12/11/24 86.6 kg (191 lb)   09/11/24 87 kg (191 lb 12.8 oz)    Body mass index is 28.94 kg/m².     BP HR SaO2   BP Readings from Last 3 Encounters:   02/05/25 132/74   12/11/24 121/79   09/11/24 136/88    Pulse Readings from Last 3 Encounters:   02/05/25 (!) 114   12/11/24 94   09/11/24 92    SpO2 Readings from Last 3 Encounters:   02/05/25 97%   09/11/24 98%   08/14/24 98%         Electronically signed by CHRISTIAN Lubin on 2/5/2025 at 10:47 AM

## 2025-02-05 NOTE — ASSESSMENT & PLAN NOTE
Chronic-with progression/exacerbation:  Reviewed and analyzed results of physiologic download from patient's machine and reviewed with patient.  Supplies and parts as needed for his machine.  These are medically necessary.  Limit caffeine use after 3pm. Based on the analyzed data will change following settings: EPAP min increased to 15.  Encouraged him to use his machine as much as possible.  Will trial pressure increase to see if this will prevent him from taking his mask off during the night.  Provided a sample mask cushion in the office today and encouraged him to contact his equipment company to order replacement supplies and/or schedule a mask fitting.  Discussed controlling mask leak will decrease machines water consumption and oral dryness.  Will see him back in 3 months.  Encouraged him to contact the office any questions or concerns.    Encouraged consistent use of his machine each night, all night.  Discussed the importance of treating RENA from a physiological standpoint.  Instructed not to drive unless had 4 hrs of effective therapy for his RENA the night before.  No driving when sleepy.  Did review the risks of under or untreated RENA including, but not limited to, higher risks of motor vehicle accidents, stroke, heart attacks, and death.  He understands and accepts all these risks.

## 2025-02-26 ENCOUNTER — OFFICE VISIT (OUTPATIENT)
Dept: FAMILY MEDICINE CLINIC | Age: 60
End: 2025-02-26
Payer: COMMERCIAL

## 2025-02-26 VITALS
HEART RATE: 102 BPM | BODY MASS INDEX: 28.8 KG/M2 | WEIGHT: 195 LBS | OXYGEN SATURATION: 97 % | SYSTOLIC BLOOD PRESSURE: 118 MMHG | DIASTOLIC BLOOD PRESSURE: 82 MMHG

## 2025-02-26 DIAGNOSIS — J45.20 MILD INTERMITTENT ASTHMA WITHOUT COMPLICATION: ICD-10-CM

## 2025-02-26 DIAGNOSIS — Z23 NEED FOR PROPHYLACTIC VACCINATION AGAINST STREPTOCOCCUS PNEUMONIAE (PNEUMOCOCCUS): ICD-10-CM

## 2025-02-26 DIAGNOSIS — E78.00 PURE HYPERCHOLESTEROLEMIA: ICD-10-CM

## 2025-02-26 DIAGNOSIS — E11.21 CONTROLLED TYPE 2 DIABETES MELLITUS WITH DIABETIC NEPHROPATHY, WITHOUT LONG-TERM CURRENT USE OF INSULIN (HCC): Primary | Chronic | ICD-10-CM

## 2025-02-26 PROCEDURE — 3074F SYST BP LT 130 MM HG: CPT | Performed by: FAMILY MEDICINE

## 2025-02-26 PROCEDURE — 90677 PCV20 VACCINE IM: CPT | Performed by: FAMILY MEDICINE

## 2025-02-26 PROCEDURE — 3079F DIAST BP 80-89 MM HG: CPT | Performed by: FAMILY MEDICINE

## 2025-02-26 PROCEDURE — 90471 IMMUNIZATION ADMIN: CPT | Performed by: FAMILY MEDICINE

## 2025-02-26 PROCEDURE — 99214 OFFICE O/P EST MOD 30 MIN: CPT | Performed by: FAMILY MEDICINE

## 2025-02-26 SDOH — ECONOMIC STABILITY: FOOD INSECURITY: WITHIN THE PAST 12 MONTHS, THE FOOD YOU BOUGHT JUST DIDN'T LAST AND YOU DIDN'T HAVE MONEY TO GET MORE.: NEVER TRUE

## 2025-02-26 SDOH — ECONOMIC STABILITY: FOOD INSECURITY: WITHIN THE PAST 12 MONTHS, YOU WORRIED THAT YOUR FOOD WOULD RUN OUT BEFORE YOU GOT MONEY TO BUY MORE.: NEVER TRUE

## 2025-02-26 ASSESSMENT — PATIENT HEALTH QUESTIONNAIRE - PHQ9
SUM OF ALL RESPONSES TO PHQ QUESTIONS 1-9: 0
2. FEELING DOWN, DEPRESSED OR HOPELESS: NOT AT ALL
SUM OF ALL RESPONSES TO PHQ QUESTIONS 1-9: 0
SUM OF ALL RESPONSES TO PHQ9 QUESTIONS 1 & 2: 0
1. LITTLE INTEREST OR PLEASURE IN DOING THINGS: NOT AT ALL

## 2025-02-26 NOTE — PROGRESS NOTES
Marc Valladares is a 59 y.o. male.    HPI: Here for complex medical visit and sore neck  Has had pain in the middle of his posterior neck for 1 week without a lot of stiffness, denies radiation or paresthesias, no known injury.  Has not tried anything for this    Meds, vitamins and allergies reviewed with pt    ROS: No TIA's or unusual headaches, no dysphagia.  No prolonged cough. No dyspnea or chest pain on exertion.  No abdominal pain, change in bowel habits, black or bloody stools.  No urinary tract symptoms.  No new or unusual musculoskeletal symptoms.       Prior to Visit Medications    Medication Sig Taking? Authorizing Provider   lisinopril (PRINIVIL;ZESTRIL) 40 MG tablet Take 1 tablet by mouth daily Yes Vincent Sigala MD   metFORMIN (GLUCOPHAGE) 500 MG tablet TAKE 1 TABLET BY MOUTH 2 TIMES A DAY WITH MEALS Yes Vincent Sigala MD   rosuvastatin (CRESTOR) 10 MG tablet Take 1 tablet by mouth daily Yes Emerita Cheng APRN - CNP   albuterol sulfate HFA (PROVENTIL;VENTOLIN;PROAIR) 108 (90 Base) MCG/ACT inhaler Inhale 2 puffs into the lungs every 6 hours as needed for Wheezing Yes Vincent Sigala MD   omeprazole (PRILOSEC) 20 MG delayed release capsule Take 1 capsule by mouth every morning (before breakfast) Yes Vincent Sigala MD   blood glucose test strips (FREESTYLE LITE) strip test blood sugar once daily as instructed. Yes Vincent Sigala MD   acetaminophen (TYLENOL) 500 MG tablet Take 2 tablets by mouth every 6 hours as needed for Pain Yes Chelsea Rushing MD   FreeStyle Lancets MISC use one time daily Yes Vincent Sigala MD   ibuprofen (IBU) 600 MG tablet Take 1 tablet by mouth every 8 hours as needed for Pain Yes Emerita Cheng APRN - CNP   glucose monitoring kit (FREESTYLE) monitoring kit Use to check BS daily Yes Emerita Cheng APRN - CNP   Blood Pressure KIT Please dispense one BP kit Yes Emerita Cheng APRN - CNP       Past Medical History:   Diagnosis Date    AR

## 2025-03-05 NOTE — TELEPHONE ENCOUNTER
Medication and Quantity requested:    fexofenadine (ALLEGRA) 180 MG tablet [1     Last Visit 02/26/2025    Qty unknown    Pharmacy and phone number updated in EPIC:  yes    Please send a new prescription to [facility]

## 2025-03-06 RX ORDER — FEXOFENADINE HCL 180 MG/1
180 TABLET ORAL DAILY
Qty: 30 TABLET | Refills: 3 | Status: SHIPPED | OUTPATIENT
Start: 2025-03-06

## 2025-03-14 DIAGNOSIS — E78.00 PURE HYPERCHOLESTEROLEMIA: ICD-10-CM

## 2025-03-18 RX ORDER — ROSUVASTATIN CALCIUM 10 MG/1
10 TABLET, COATED ORAL DAILY
Qty: 90 TABLET | Refills: 1 | Status: SHIPPED | OUTPATIENT
Start: 2025-03-18

## 2025-03-18 NOTE — TELEPHONE ENCOUNTER
Medication:   Requested Prescriptions     Pending Prescriptions Disp Refills    rosuvastatin (CRESTOR) 10 MG tablet [Pharmacy Med Name: ROSUVASTATIN CALCIUM 10 MG TAB] 90 tablet 1     Sig: TAKE 1 TABLET BY MOUTH DAILY       Last Filled:  09/12/2024    Patient Phone Number: [phone] (home)     Last appt: 2/26/2025   Next appt: Visit date not found    Last Lipid:   Lab Results   Component Value Date/Time    CHOL 189 06/26/2017 10:51 AM    TRIG 162 06/26/2017 10:51 AM    HDL 38 09/11/2024 12:11 PM    HDL 43 12/14/2011 08:50 AM

## 2025-04-28 NOTE — TELEPHONE ENCOUNTER
Medication:   Requested Prescriptions     Pending Prescriptions Disp Refills    metFORMIN (GLUCOPHAGE) 500 MG tablet 180 tablet 3     Sig: Take 1 tablet by mouth 2 times daily (with meals)       Last Filled:  11/25/2024    Patient Phone Number: [phone] (home)     Last appt: 2/26/2025   Next appt: 4/29/2025    Last Labs DM:   Lab Results   Component Value Date/Time    LABA1C 7.5 09/11/2024 11:34 AM

## 2025-04-28 NOTE — TELEPHONE ENCOUNTER
Medication:   Requested Prescriptions     Pending Prescriptions Disp Refills    metFORMIN (GLUCOPHAGE) 500 MG tablet 180 tablet 3     Sig: Take 1 tablet by mouth 2 times daily (with meals)        Last Filled:  11/25/24    Patient Phone Number: [phone] (home)     Last appt: 2/26/2025   Next appt: 4/29/2025    Last OARRS:        No data to display

## 2025-04-28 NOTE — TELEPHONE ENCOUNTER
Medication and Quantity requested:        metFORMIN (GLUCOPHAGE) 500 MG tablet [[id number]]     Last Visit  02/26/2025      Pharmacy and phone number updated in EPIC:  yes      Adrian[facility] ruiz     Patient is on his last dose.

## 2025-04-29 ENCOUNTER — TELEMEDICINE (OUTPATIENT)
Dept: FAMILY MEDICINE CLINIC | Age: 60
End: 2025-04-29
Payer: COMMERCIAL

## 2025-04-29 DIAGNOSIS — R25.2 LEG CRAMPS: Primary | ICD-10-CM

## 2025-04-29 DIAGNOSIS — M54.2 NECK PAIN: ICD-10-CM

## 2025-04-29 DIAGNOSIS — E11.21 CONTROLLED TYPE 2 DIABETES MELLITUS WITH DIABETIC NEPHROPATHY, WITHOUT LONG-TERM CURRENT USE OF INSULIN (HCC): Chronic | ICD-10-CM

## 2025-04-29 PROCEDURE — 99214 OFFICE O/P EST MOD 30 MIN: CPT | Performed by: NURSE PRACTITIONER

## 2025-04-29 ASSESSMENT — PATIENT HEALTH QUESTIONNAIRE - PHQ9
2. FEELING DOWN, DEPRESSED OR HOPELESS: NOT AT ALL
1. LITTLE INTEREST OR PLEASURE IN DOING THINGS: NOT AT ALL
SUM OF ALL RESPONSES TO PHQ QUESTIONS 1-9: 0
SUM OF ALL RESPONSES TO PHQ QUESTIONS 1-9: 0

## 2025-04-29 NOTE — PROGRESS NOTES
Date of Visit:  2025    CC: Marc Valladares (: 1965) is a 59 y.o. male, established patient, here for evaluation/re-evaluation of the following medical concerns:    ASSESSMENT/PLAN:  1. Leg cramps  New, not at goal (unstable), awaiting labs, may hold Crestor  Advised to drink Gatorade zero throughout day- states he does not tolerate artificial sweeteners. Recommend adding a pinch of salt to water.   May consider muscle relaxer prn  -     Comprehensive Metabolic Panel; Future  -     Magnesium; Future  -     CK; Future  2. Neck pain   Acute condition, new, suspect arthritis due to aging. Describes as cracking or popping in neck, no significant pain. No decreased ROM  surveillance    3. Controlled type 2 diabetes mellitus with diabetic nephropathy, without long-term current use of insulin (HCC)  Chronic, at goal (stable), continue current treatment plan  -     metFORMIN (GLUCOPHAGE) 500 MG tablet; Take 1 tablet by mouth 2 times daily (with meals), Disp-180 tablet, R-0Normal       Return if symptoms worsen or fail to improve.         2025     8:21 PM   Patient-Reported Vitals   Patient-Reported Weight 192   Patient-Reported Height 5'6        Wt Readings from Last 3 Encounters:   25 88.5 kg (195 lb)   25 88.9 kg (196 lb)   24 86.6 kg (191 lb)     BP Readings from Last 3 Encounters:   25 118/82   25 132/74   24 121/79     Estimated body mass index is 28.8 kg/m² as calculated from the following:    Height as of 25: 1.753 m (5' 9\").    Weight as of 25: 88.5 kg (195 lb).    HPI  Leg cramps: present for few months. Occur during day and at night. Increasing in frequency. He drinks approx 2L mineral water a day. 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 He is taking Crestor 10 mg daily.     Also c/o cracking in neck. States certain mov'ts cause the \"popping\" or \"cracking\" sensation, usually when rotating head to right. Denies pain or numbness in

## 2025-04-30 ENCOUNTER — RESULTS FOLLOW-UP (OUTPATIENT)
Dept: FAMILY MEDICINE CLINIC | Age: 60
End: 2025-04-30

## 2025-04-30 ENCOUNTER — HOSPITAL ENCOUNTER (OUTPATIENT)
Age: 60
Discharge: HOME | End: 2025-04-30
Payer: COMMERCIAL

## 2025-04-30 DIAGNOSIS — R25.2 LEG CRAMPS: ICD-10-CM

## 2025-04-30 LAB
ALBUMIN SERPL-MCNC: 4 G/DL (ref 3.4–5)
ALBUMIN/GLOB SERPL: 1.8 {RATIO} (ref 1.1–2.2)
ALP SERPL-CCNC: 63 U/L (ref 40–129)
ALT SERPL-CCNC: 27 U/L (ref 10–40)
ANION GAP SERPL CALCULATED.3IONS-SCNC: 9 MMOL/L (ref 3–16)
AST SERPL-CCNC: 22 U/L (ref 15–37)
BILIRUB SERPL-MCNC: 0.6 MG/DL (ref 0–1)
BUN SERPL-MCNC: 12 MG/DL (ref 7–20)
CALCIUM SERPL-MCNC: 9 MG/DL (ref 8.3–10.6)
CHLORIDE SERPL-SCNC: 104 MMOL/L (ref 99–110)
CK SERPL-CCNC: 148 U/L (ref 39–308)
CO2 SERPL-SCNC: 25 MMOL/L (ref 21–32)
CREAT SERPL-MCNC: 0.8 MG/DL (ref 0.9–1.3)
GFR SERPLBLD CREATININE-BSD FMLA CKD-EPI: >90 ML/MIN/{1.73_M2}
GLUCOSE SERPL-MCNC: 169 MG/DL (ref 70–99)
MAGNESIUM SERPL-MCNC: 2.17 MG/DL (ref 1.8–2.4)
POTASSIUM SERPL-SCNC: 3.8 MMOL/L (ref 3.5–5.1)
PROT SERPL-MCNC: 6.2 G/DL (ref 6.4–8.2)
SODIUM SERPL-SCNC: 138 MMOL/L (ref 136–145)

## 2025-04-30 PROCEDURE — 83735 ASSAY OF MAGNESIUM: CPT

## 2025-04-30 PROCEDURE — 36415 COLL VENOUS BLD VENIPUNCTURE: CPT

## 2025-04-30 PROCEDURE — 82550 ASSAY OF CK (CPK): CPT

## 2025-04-30 PROCEDURE — 80053 COMPREHEN METABOLIC PANEL: CPT

## 2025-05-01 NOTE — PROGRESS NOTES
Marc Valladares is a 59 y.o. male.    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  No prolonged cough. No dyspnea or chest pain on exertion.  No abdominal pain, change in bowel habits, black or bloody stools.  No urinary tract symptoms.  No new or unusual musculoskeletal symptoms.       Prior to Visit Medications    Medication Sig Taking? Authorizing Provider   metFORMIN (GLUCOPHAGE) 500 MG tablet Take 1 tablet by mouth 2 times daily (with meals) Yes Emerita Cheng, APRN - CNP   rosuvastatin (CRESTOR) 10 MG tablet TAKE 1 TABLET BY MOUTH DAILY Yes Vincent Sigala MD   fexofenadine (ALLEGRA) 180 MG tablet Take 1 tablet by mouth daily Yes Vincent Sigala MD   lisinopril (PRINIVIL;ZESTRIL) 40 MG tablet Take 1 tablet by mouth daily Yes Vincent Sigala MD   albuterol sulfate HFA (PROVENTIL;VENTOLIN;PROAIR) 108 (90 Base) MCG/ACT inhaler Inhale 2 puffs into the lungs every 6 hours as needed for Wheezing Yes Vincent Sigala MD   omeprazole (PRILOSEC) 20 MG delayed release capsule Take 1 capsule by mouth every morning (before breakfast) Yes Vincent Sigala MD   blood glucose test strips (FREESTYLE LITE) strip test blood sugar once daily as instructed. Yes Vincent Sigala MD   acetaminophen (TYLENOL) 500 MG tablet Take 2 tablets by mouth every 6 hours as needed for Pain Yes Chelsea Rushing MD   FreeStyle Lancets

## 2025-05-02 ENCOUNTER — OFFICE VISIT (OUTPATIENT)
Dept: FAMILY MEDICINE CLINIC | Age: 60
End: 2025-05-02

## 2025-05-02 ENCOUNTER — TELEPHONE (OUTPATIENT)
Dept: CARDIOLOGY CLINIC | Age: 60
End: 2025-05-02

## 2025-05-02 VITALS
DIASTOLIC BLOOD PRESSURE: 88 MMHG | WEIGHT: 194.8 LBS | HEART RATE: 98 BPM | OXYGEN SATURATION: 98 % | HEIGHT: 69 IN | BODY MASS INDEX: 28.85 KG/M2 | SYSTOLIC BLOOD PRESSURE: 136 MMHG

## 2025-05-02 DIAGNOSIS — I10 PRIMARY HYPERTENSION: Chronic | ICD-10-CM

## 2025-05-02 DIAGNOSIS — R07.9 CHEST PAIN, UNSPECIFIED TYPE: ICD-10-CM

## 2025-05-02 DIAGNOSIS — E11.21 CONTROLLED TYPE 2 DIABETES MELLITUS WITH DIABETIC NEPHROPATHY, WITHOUT LONG-TERM CURRENT USE OF INSULIN (HCC): Chronic | ICD-10-CM

## 2025-05-02 DIAGNOSIS — I48.0 PAROXYSMAL ATRIAL FIBRILLATION (HCC): ICD-10-CM

## 2025-05-02 DIAGNOSIS — G47.33 OSA (OBSTRUCTIVE SLEEP APNEA): Primary | Chronic | ICD-10-CM

## 2025-05-02 DIAGNOSIS — R07.89 ATYPICAL CHEST PAIN: ICD-10-CM

## 2025-05-02 DIAGNOSIS — J45.20 MILD INTERMITTENT ASTHMA WITHOUT COMPLICATION: ICD-10-CM

## 2025-05-02 LAB — HBA1C MFR BLD: 7.5 %

## 2025-05-05 ENCOUNTER — OFFICE VISIT (OUTPATIENT)
Dept: CARDIOLOGY CLINIC | Age: 60
End: 2025-05-05
Payer: COMMERCIAL

## 2025-05-05 ENCOUNTER — TELEPHONE (OUTPATIENT)
Dept: CARDIOLOGY CLINIC | Age: 60
End: 2025-05-05

## 2025-05-05 VITALS
HEART RATE: 86 BPM | WEIGHT: 193.2 LBS | DIASTOLIC BLOOD PRESSURE: 84 MMHG | OXYGEN SATURATION: 97 % | SYSTOLIC BLOOD PRESSURE: 130 MMHG | HEIGHT: 69 IN | BODY MASS INDEX: 28.61 KG/M2

## 2025-05-05 DIAGNOSIS — I48.91 NEW ONSET A-FIB (HCC): Primary | ICD-10-CM

## 2025-05-05 DIAGNOSIS — I48.0 PAROXYSMAL ATRIAL FIBRILLATION (HCC): Primary | ICD-10-CM

## 2025-05-05 DIAGNOSIS — R93.1 ABNORMAL SCREENING CARDIAC CT: ICD-10-CM

## 2025-05-05 DIAGNOSIS — I48.91 ATRIAL FIBRILLATION, UNSPECIFIED TYPE (HCC): ICD-10-CM

## 2025-05-05 DIAGNOSIS — I48.0 PAROXYSMAL ATRIAL FIBRILLATION (HCC): ICD-10-CM

## 2025-05-05 PROBLEM — G47.30 SLEEP APNEA IN ADULT: Status: ACTIVE | Noted: 2017-06-29

## 2025-05-05 PROCEDURE — 3079F DIAST BP 80-89 MM HG: CPT

## 2025-05-05 PROCEDURE — G2211 COMPLEX E/M VISIT ADD ON: HCPCS

## 2025-05-05 PROCEDURE — 99204 OFFICE O/P NEW MOD 45 MIN: CPT

## 2025-05-05 PROCEDURE — 3075F SYST BP GE 130 - 139MM HG: CPT

## 2025-05-05 RX ORDER — METOPROLOL SUCCINATE 25 MG/1
25 TABLET, EXTENDED RELEASE ORAL DAILY
Qty: 90 TABLET | Refills: 3 | Status: SHIPPED | OUTPATIENT
Start: 2025-05-05

## 2025-05-05 NOTE — PROGRESS NOTES
[facility]   Electrophysiology  Refugio Diggs PA-C  Attending EP: ***    Date: 5/5/2025  I had the privilege of visiting Marc Valladares in the office.     Chief Complaint: No chief complaint on file.    History of Present Illness: History obtained from patient and medical record.    Marc Valladares is 59 y.o. male with a past medical history of HTN, asthma, DM, RENA with CPAP use, HLD    Pt was seen 5/2 at PCP and was found to be in atrial fibrillation with controlled rate. Referred to AF clinic    Interval history: Today, Marc Valladares is being seen for ***    Denies having chest pain, palpitations, shortness of breath, orthopnea/PND, cough, or dizziness at the time of this visit.    With regard to medication therapy the patient {HAS HAS NOT:18834} been compliant with prescribed regimen. They {HAVE/HAVE NOT:19739} tolerated therapy to date.       Assessment:    New onset atrial fibrillation  - In ***  - Endorses symptoms of *** when out of rhythm  - TSH 1.34 as of 9/2024  - Last echo with ***  - Positive coronary calcium score ***  - Pt's activity level is ***  - Alcohol use  - High risk [id number] score (HTN, DM), start therapy with . *** s/s of bleeding, continue to monitor.  ~ Watchman ***  - Rate control  - Rhythm control  - Discussed treatment options, including cardioversion, ablation, rate control, and rhythm control  - Counseled on risk factors for Afib, benefits of losing weight and regular exercise    DM  - Managed by primary care  - Fairly controlled per note from 5/2    HTN  - BP goal <130/80   ~BP today ***  - Encouraged to monitor BP at home, pt has BP machine ***  - Recommended low salt diet to help with BP control  - Stressed importance of getting exercise 3-5 times weekly and maintaining healthy weight    RENA  - Compliant with CPAP ***  - Discussed long term impact of untreated sleep apnea        Plan:  1.    F/U: Follow-up with EP in ***  -Follow up with device clinic as 
(Personally Interpreted)  - Atrial fibrillation, HR 90, QRS 86, QTc 388    Echo:   None    CT cardiac calcium score 10/2023  1. Cardiac calcium Agatston score 287.5.   2. Multi-Ethnic study of atherosclerosis (SCHROEDER) percentile ranking 91st percentile.  Based on   SCHROEDER percentile ranking, 91% of patients of the same age, gender and race/ethnicity who are   free of clinical cardiovascular disease and treated diabetes will have a lower score than the patient.             I have addressed the patient's cardiac risk factors and adjusted pharmacologic treatment as needed. In addition, I have reinforced the need for patient directed risk factor modification.    I independently reviewed the ECG    All questions and concerns were addressed with the patient. Alternatives to treatment were discussed.     Thank you for allowing to us to participate in the care of Marc Diggs PA-C  [facility] [place]   Office: [phone]

## 2025-05-05 NOTE — TELEPHONE ENCOUNTER
You are scheduled for a cardioversion.    Our  will call you to discuss a date for your procedure.    The day of your procedure you will need to check in at the Registration Desk in the Main Lobby.    PRE-PROCEDURE INSTRUCTIONS   Do not eat or drink anything after midnight the night before your procedure.   Take all your medications with a sip of water in the morning.   Do not hold your Eliquis   Please have a responsible adult to drive you home after your procedure.    If you have any questions regarding your procedure itself or your medications, please call [phone] and ask to speak to an EP nurse.

## 2025-05-05 NOTE — PATIENT INSTRUCTIONS
Start taking Eliquis to prevent stroke and metoprolol to help control heart rate. Monitor your heart rate on your watch and check your blood pressure at home.    Get lab work to check your thyroid before your follow up visit with us    Plan for cardioversion in 3-4 weeks after you have been on Eliquis for enough time.     Get echo done after your cardioversion to get best images.

## 2025-05-06 ENCOUNTER — TELEPHONE (OUTPATIENT)
Dept: CARDIOLOGY CLINIC | Age: 60
End: 2025-05-06

## 2025-05-06 NOTE — TELEPHONE ENCOUNTER
Spoke with pt on the phone and answered questions regarding CT results and interventional referral.

## 2025-05-06 NOTE — TELEPHONE ENCOUNTER
Pt would like to speak to Wesley regarding referral to interventional.  Patient states he was told the CT was fine and doesn't understand why he has this referral.  Pt can be reached at [phone].

## 2025-05-08 NOTE — TELEPHONE ENCOUNTER
Procedure: DCCV    Date Of Procedure:  6/6/25    Time Of Arrival: 930AM    Procedure Time: 1030AM       Called and spoke to patient and he is agreeable to the date and time. Reviewed the Pre-Procedure instructions and they verbalized understanding. Encouraged to call with any questions or concerns.       Published on [employer] / emailed to Cath lab / note in chart / scheduled in Epic/Cupid/Carto

## 2025-05-13 ENCOUNTER — TELEPHONE (OUTPATIENT)
Dept: FAMILY MEDICINE CLINIC | Age: 60
End: 2025-05-13

## 2025-05-13 NOTE — TELEPHONE ENCOUNTER
Patient called and said he feels like he has a sinus infection     Last Thursday started with runny nose   Coughing   Stuffy nose     No fever  No chills   No other symptoms at this time     He said when he coughs up phlegm it tastes awful     He would like to know if there is any medication he can take that won't interfere with his     Eliquis, and his Metoprolol he has just started from his cardiologist.     Or if there is an antibiotic he can take.     Please call and advise     Pharm is [facility]

## 2025-05-14 DIAGNOSIS — I48.91 NEW ONSET A-FIB (HCC): ICD-10-CM

## 2025-05-14 LAB — TSH SERPL DL<=0.005 MIU/L-ACNC: 2.24 UIU/ML (ref 0.27–4.2)

## 2025-06-06 ENCOUNTER — HOSPITAL ENCOUNTER (OUTPATIENT)
Age: 60
Discharge: HOME | End: 2025-06-08
Attending: INTERNAL MEDICINE
Payer: COMMERCIAL

## 2025-06-06 ENCOUNTER — TELEPHONE (OUTPATIENT)
Dept: CARDIOLOGY CLINIC | Age: 60
End: 2025-06-06

## 2025-06-06 VITALS
DIASTOLIC BLOOD PRESSURE: 104 MMHG | RESPIRATION RATE: 18 BRPM | HEIGHT: 69 IN | HEART RATE: 79 BPM | WEIGHT: 194 LBS | OXYGEN SATURATION: 89 % | SYSTOLIC BLOOD PRESSURE: 165 MMHG | BODY MASS INDEX: 28.73 KG/M2

## 2025-06-06 DIAGNOSIS — R94.31 ABNORMAL ELECTROCARDIOGRAPHY: Primary | ICD-10-CM

## 2025-06-06 DIAGNOSIS — R06.02 SHORTNESS OF BREATH: ICD-10-CM

## 2025-06-06 DIAGNOSIS — I48.0 PAROXYSMAL ATRIAL FIBRILLATION (HCC): ICD-10-CM

## 2025-06-06 LAB — ECHO BSA: 2.07 M2

## 2025-06-06 PROCEDURE — 99152 MOD SED SAME PHYS/QHP 5/>YRS: CPT | Performed by: INTERNAL MEDICINE

## 2025-06-06 PROCEDURE — 92960 CARDIOVERSION ELECTRIC EXT: CPT

## 2025-06-06 PROCEDURE — 93005 ELECTROCARDIOGRAM TRACING: CPT

## 2025-06-06 PROCEDURE — 7100000011 HC PHASE II RECOVERY - ADDTL 15 MIN: Performed by: INTERNAL MEDICINE

## 2025-06-06 PROCEDURE — 2500000003 HC RX 250 WO HCPCS: Performed by: INTERNAL MEDICINE

## 2025-06-06 PROCEDURE — 7100000010 HC PHASE II RECOVERY - FIRST 15 MIN: Performed by: INTERNAL MEDICINE

## 2025-06-06 PROCEDURE — 92960 CARDIOVERSION ELECTRIC EXT: CPT | Performed by: INTERNAL MEDICINE

## 2025-06-06 RX ADMIN — METHOHEXITAL SODIUM 50 MG: 500 INJECTION, POWDER, LYOPHILIZED, FOR SOLUTION INTRAMUSCULAR; INTRAVENOUS; RECTAL at 10:36

## 2025-06-06 NOTE — DISCHARGE INSTRUCTIONS
CARDIOVERSION DISCHARGE INSTRUCTIONS    No driving for 24 hours. We strongly recommend that a responsible adult stay with you for the next 24 hours.    Continue eliquis.    Hydrocortisone 1% cream to reddened areas as needed.

## 2025-06-06 NOTE — TELEPHONE ENCOUNTER
Samples and financial assistance paper work along with printed script was provided to patient per [facility] request.

## 2025-06-06 NOTE — PROCEDURES
[facility]     Electrophysiology Procedure Note       Date of Procedure: 6/6/2025  Patient's Name: Marc Valladares  YOB: 1965   Medical Record Number: [id number]  Procedure Performed by: Keyshawn Cochran MD    Procedures performed:  IV sedation.     External Electrical cardioversion   [id number]  ASA 3    Indication of the procedure: Persistent atrial fibrillation      Details of procedure:   The patient was brought to the cath lab area in a fasting and non-sedated state. The risks, benefits and alternatives of the procedure were discussed with the patient. The patient opted to proceed with the procedure. Written informed consent was signed and placed in the chart.  A timeout protocol was completed to identify the patient and the procedure being performed.        I pushed 55 mg Brevital.An independent trained observer pushed medications and/or monitored patient  at my direction.   We monitored the patient's level of consciousness and vital signs/physiologic status throughout the procedure duration (see start and stop times below).  Sedation:     Sedation start: 1029  Sedation stop: 1039     Patient is on chronic anticoagulation therapy.   Then we used Brevital for sedation and electrical DC cardioversion was perfomred using 200J, synchronized shock. Patient was converted to sinus rhythm at 65 bpm. The patient tolerated the procedure well and there were no complications.     Conclusion:   Successful external DC cardioversion of atrial fibrillation, however, he went back into atrial fibrillation after about 10 minutes.  Plan:   The patient can be discharged if remains stable. Will continue with medical therapy.     Stress test  Follow-up with the results of echo and stress.  Consider antiarrhythmic versus ablation

## 2025-06-06 NOTE — H&P
[facility]   Electrophysiology       Chief Complaint:   Chief Complaint   Patient presents with    New Patient     New onset Afib     chest pressure     History of Present Illness: History obtained from patient and medical record.    Marc Valladares is 59 y.o. male with a past medical history of HTN, asthma, DM, RENA with CPAP use, HLD. Former smoker, quit 30 years ago.    Pt was seen 5/2 at PCP and was found to be in atrial fibrillation with controlled rate. Referred to AF clinic    Interval history: Today, Marc Valladares is being seen for newly discovered atrial fibrillation    States he did not know he was out of rhythm. He gets a bit out of breath when he exercises but states it has been unchanged from his normal tolerance. Has been less active lately d/t weather, typically goes for walks. 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    He states he noticed on his watch that his HR was running higher over the last year.    Gets leg cramps, typically at rest and not with activity. Happens after work. He switched from simvastatin to rosuvastatin.    Denies having chest pain, palpitations, shortness of breath, orthopnea/PND, cough, or dizziness at the time of this visit.    With regard to medication therapy the patient has been compliant with prescribed regimen. They have tolerated therapy to date.       Assessment:    New onset atrial fibrillation  - In atrial fibrillation with controlled rate  - Asymptomatic  - TSH 1.34 as of 9/2024  - Check echo  - Positive coronary calcium score in 2023, referral to interventional cardiology  - Drinks about 3-4 alcoholic drinks Saturday and Sunday  - High risk [id number] score (HTN, DM), start therapy with Eliquis 5 mg BID. No s/s of bleeding, continue to monitor.  ~ Has history of hemorrhoids  - Start Toprol 25 mg daily  - Discussed treatment options, including cardioversion, ablation, rate control, and rhythm control  - Counseled on risk  Pain Yes Chelsea Rushing MD   FreeStyle Lancets MISC use one time daily Yes Vincent Sigala MD   ibuprofen (IBU) 600 MG tablet Take 1 tablet by mouth every 8 hours as needed for Pain Yes Emerita Cheng APRN - CNP   glucose monitoring kit (FREESTYLE) monitoring kit Use to check BS daily Yes Emerita Cheng APRN - CNP   Blood Pressure KIT Please dispense one BP kit Yes Emerita Cheng APRN - CNP      Past Medical History:  Past Medical History:   Diagnosis Date    AR (allergic rhinitis)     Cellulitis of finger of right hand 05/24/2017    Controlled type 2 diabetes mellitus with diabetic nephropathy, without long-term current use of insulin ([facility])     Diabetes type 2, controlled ([facility]) 01/13/2014    Diabetes type 2, controlled ([facility]) 01/13/2014    Diabetes type 2, controlled ([facility]) 01/13/2014    Mild intermittent asthma without complication 12/03/2015    RENA (obstructive sleep apnea) 06/29/2017    uses CPAP    Primary hypertension 01/19/2022    Pure hypercholesterolemia 12/03/2015    Type II or unspecified type diabetes mellitus without mention of complication, not stated as uncontrolled 01/2014     Past Surgical History:    has a past surgical history that includes Breast cyst incision and drainage and Inguinal hernia repair (Bilateral, 1/17/14).     Social History:  Personally Reviewed.  reports that he quit smoking about 32 years ago. His smoking use included cigarettes. He started smoking about 42 years ago. He has a 10 pack-year smoking history. He has been exposed to tobacco smoke. He has never used smokeless tobacco. He reports current alcohol use of about 5.0 standard drinks of alcohol per week. He reports that he does not use drugs.     Family History:  Personally Reviewed. family history includes Diabetes in his brother and father; Heart Attack (age of onset: 51) in his brother.   Denies family history of sudden cardiac death, arrhythmia, premature CAD    Review of Systems:  Constitutional: Negative

## 2025-06-06 NOTE — TELEPHONE ENCOUNTER
Per [facility], patient needing stress test and follow up in about 3 weeks post testing. Eliquis samples provided. Patient is scheduled for echo and stress 6/10/2025 and [facility] 7/2/2025.

## 2025-06-07 LAB
EKG DIAGNOSIS: NORMAL
EKG Q-T INTERVAL: 388 MS
EKG QRS DURATION: 90 MS
EKG QTC CALCULATION (BAZETT): 430 MS
EKG R AXIS: 39 DEGREES
EKG T AXIS: 26 DEGREES
EKG VENTRICULAR RATE: 74 BPM

## 2025-06-09 LAB — ECHO BSA: 2.07 M2

## 2025-06-10 ENCOUNTER — HOSPITAL ENCOUNTER (OUTPATIENT)
Age: 60
Discharge: HOME | End: 2025-06-12
Attending: INTERNAL MEDICINE
Payer: COMMERCIAL

## 2025-06-10 ENCOUNTER — HOSPITAL ENCOUNTER (OUTPATIENT)
Age: 60
Discharge: HOME | End: 2025-06-12
Payer: COMMERCIAL

## 2025-06-10 VITALS — BODY MASS INDEX: 29.1 KG/M2 | WEIGHT: 192 LBS | HEIGHT: 68 IN

## 2025-06-10 VITALS
HEIGHT: 69 IN | SYSTOLIC BLOOD PRESSURE: 139 MMHG | WEIGHT: 194 LBS | BODY MASS INDEX: 28.73 KG/M2 | DIASTOLIC BLOOD PRESSURE: 91 MMHG

## 2025-06-10 VITALS
BODY MASS INDEX: 29.1 KG/M2 | DIASTOLIC BLOOD PRESSURE: 89 MMHG | HEART RATE: 70 BPM | HEIGHT: 68 IN | SYSTOLIC BLOOD PRESSURE: 151 MMHG | WEIGHT: 192 LBS

## 2025-06-10 DIAGNOSIS — R06.02 SHORTNESS OF BREATH: ICD-10-CM

## 2025-06-10 DIAGNOSIS — I48.0 PAROXYSMAL ATRIAL FIBRILLATION (HCC): ICD-10-CM

## 2025-06-10 DIAGNOSIS — R94.31 ABNORMAL ELECTROCARDIOGRAPHY: ICD-10-CM

## 2025-06-10 LAB
ECHO AO ASC DIAM: 3.3 CM
ECHO AO ASCENDING AORTA INDEX: 1.62 CM/M2
ECHO AO ROOT DIAM: 3 CM
ECHO AO ROOT INDEX: 1.47 CM/M2
ECHO AV AREA PEAK VELOCITY: 3 CM2
ECHO AV AREA VTI: 3.3 CM2
ECHO AV AREA/BSA PEAK VELOCITY: 1.5 CM2/M2
ECHO AV AREA/BSA VTI: 1.6 CM2/M2
ECHO AV MEAN GRADIENT: 3 MMHG
ECHO AV MEAN VELOCITY: 0.8 M/S
ECHO AV PEAK GRADIENT: 6 MMHG
ECHO AV PEAK VELOCITY: 1.2 M/S
ECHO AV VELOCITY RATIO: 0.83
ECHO AV VTI: 20.1 CM
ECHO BSA: 2.04 M2
ECHO BSA: 2.07 M2
ECHO EST RA PRESSURE: 8 MMHG
ECHO IVC INSP: 1.1 CM
ECHO IVC PROX: 2.3 CM
ECHO LA AREA 2C: 28.3 CM2
ECHO LA AREA 4C: 20.7 CM2
ECHO LA MAJOR AXIS: 5.9 CM
ECHO LA MINOR AXIS: 6.7 CM
ECHO LA VOL BP: 79 ML (ref 18–58)
ECHO LA VOL MOD A2C: 97 ML (ref 18–58)
ECHO LA VOL MOD A4C: 57 ML (ref 18–58)
ECHO LA VOL/BSA BIPLANE: 39 ML/M2 (ref 16–34)
ECHO LA VOLUME INDEX MOD A2C: 48 ML/M2 (ref 16–34)
ECHO LA VOLUME INDEX MOD A4C: 28 ML/M2 (ref 16–34)
ECHO LV EDV A2C: 83 ML
ECHO LV EDV A4C: 98 ML
ECHO LV EDV INDEX A4C: 48 ML/M2
ECHO LV EDV NDEX A2C: 41 ML/M2
ECHO LV EF PHYSICIAN: 60 %
ECHO LV EJECTION FRACTION A2C: 55 %
ECHO LV EJECTION FRACTION A4C: 60 %
ECHO LV EJECTION FRACTION BIPLANE: 57 % (ref 55–100)
ECHO LV ESV A2C: 37 ML
ECHO LV ESV A4C: 39 ML
ECHO LV ESV INDEX A2C: 18 ML/M2
ECHO LV ESV INDEX A4C: 19 ML/M2
ECHO LV FRACTIONAL SHORTENING: 29 % (ref 28–44)
ECHO LV INTERNAL DIMENSION DIASTOLE INDEX: 2.21 CM/M2
ECHO LV INTERNAL DIMENSION DIASTOLIC: 4.5 CM (ref 4.2–5.9)
ECHO LV INTERNAL DIMENSION SYSTOLIC INDEX: 1.57 CM/M2
ECHO LV INTERNAL DIMENSION SYSTOLIC: 3.2 CM
ECHO LV IVSD: 0.9 CM (ref 0.6–1)
ECHO LV MASS 2D: 142.9 G (ref 88–224)
ECHO LV MASS INDEX 2D: 70 G/M2 (ref 49–115)
ECHO LV POSTERIOR WALL DIASTOLIC: 1 CM (ref 0.6–1)
ECHO LV RELATIVE WALL THICKNESS RATIO: 0.44
ECHO LVOT AREA: 3.8 CM2
ECHO LVOT AV VTI INDEX: 0.87
ECHO LVOT DIAM: 2.2 CM
ECHO LVOT MEAN GRADIENT: 2 MMHG
ECHO LVOT PEAK GRADIENT: 4 MMHG
ECHO LVOT PEAK VELOCITY: 1 M/S
ECHO LVOT STROKE VOLUME INDEX: 32.6 ML/M2
ECHO LVOT SV: 66.5 ML
ECHO LVOT VTI: 17.5 CM
ECHO PV MAX VELOCITY: 1 M/S
ECHO PV PEAK GRADIENT: 4 MMHG
ECHO RA AREA 4C: 16.7 CM2
ECHO RA END SYSTOLIC VOLUME APICAL 4 CHAMBER INDEX BSA: 18 ML/M2
ECHO RA VOLUME: 36 ML
ECHO RIGHT VENTRICULAR SYSTOLIC PRESSURE (RVSP): 27 MMHG
ECHO RV BASAL DIMENSION: 3.6 CM
ECHO RV FREE WALL PEAK S': 14.9 CM/S
ECHO RV LONGITUDINAL DIMENSION: 7 CM
ECHO RV MID DIMENSION: 2.1 CM
ECHO RV TAPSE: 2.2 CM (ref 1.7–?)
ECHO TV REGURGITANT MAX VELOCITY: 2.2 M/S
ECHO TV REGURGITANT PEAK GRADIENT: 19 MMHG
NUC STRESS EJECTION FRACTION: 55 %
NUC STRESS LV EDV: 110 ML (ref 67–155)
NUC STRESS LV ESV: 50 ML (ref 22–58)
NUC STRESS LV MASS: 143 G
STRESS BASELINE DIAS BP: 89 MMHG
STRESS BASELINE HR: 71 BPM
STRESS BASELINE SYS BP: 151 MMHG
STRESS ESTIMATED WORKLOAD: 7 METS
STRESS EXERCISE DUR MIN: 4 MIN
STRESS EXERCISE DUR SEC: 30 SEC
STRESS O2 SAT PEAK: 96 %
STRESS O2 SAT REST: 95 %
STRESS PEAK DIAS BP: 90 MMHG
STRESS PEAK SYS BP: 167 MMHG
STRESS PERCENT HR ACHIEVED: 116 %
STRESS POST PEAK HR: 187 BPM
STRESS RATE PRESSURE PRODUCT: NORMAL BPM*MMHG
STRESS ST DEPRESSION: 1 MM
STRESS TARGET HR: 161 BPM
TID: 1.1

## 2025-06-10 PROCEDURE — 93018 CV STRESS TEST I&R ONLY: CPT | Performed by: INTERNAL MEDICINE

## 2025-06-10 PROCEDURE — 3430000000 HC RX DIAGNOSTIC RADIOPHARMACEUTICAL: Performed by: INTERNAL MEDICINE

## 2025-06-10 PROCEDURE — 93017 CV STRESS TEST TRACING ONLY: CPT

## 2025-06-10 PROCEDURE — 93306 TTE W/DOPPLER COMPLETE: CPT | Performed by: INTERNAL MEDICINE

## 2025-06-10 PROCEDURE — 93016 CV STRESS TEST SUPVJ ONLY: CPT | Performed by: INTERNAL MEDICINE

## 2025-06-10 PROCEDURE — 93306 TTE W/DOPPLER COMPLETE: CPT

## 2025-06-10 PROCEDURE — A9502 TC99M TETROFOSMIN: HCPCS | Performed by: INTERNAL MEDICINE

## 2025-06-10 PROCEDURE — 78452 HT MUSCLE IMAGE SPECT MULT: CPT | Performed by: INTERNAL MEDICINE

## 2025-06-10 PROCEDURE — 76376 3D RENDER W/INTRP POSTPROCES: CPT | Performed by: INTERNAL MEDICINE

## 2025-06-10 PROCEDURE — 78452 HT MUSCLE IMAGE SPECT MULT: CPT

## 2025-06-10 RX ADMIN — TETROFOSMIN 10.5 MILLICURIE: 1.38 INJECTION, POWDER, LYOPHILIZED, FOR SOLUTION INTRAVENOUS at 10:31

## 2025-06-10 RX ADMIN — TETROFOSMIN 32.2 MILLICURIE: 1.38 INJECTION, POWDER, LYOPHILIZED, FOR SOLUTION INTRAVENOUS at 11:29

## 2025-06-17 ENCOUNTER — OFFICE VISIT (OUTPATIENT)
Dept: FAMILY MEDICINE CLINIC | Age: 60
End: 2025-06-17

## 2025-06-17 VITALS
HEIGHT: 68 IN | OXYGEN SATURATION: 98 % | WEIGHT: 193 LBS | DIASTOLIC BLOOD PRESSURE: 68 MMHG | SYSTOLIC BLOOD PRESSURE: 106 MMHG | BODY MASS INDEX: 29.25 KG/M2 | HEART RATE: 83 BPM

## 2025-06-17 DIAGNOSIS — I10 PRIMARY HYPERTENSION: Chronic | ICD-10-CM

## 2025-06-17 DIAGNOSIS — I48.0 PAROXYSMAL ATRIAL FIBRILLATION (HCC): ICD-10-CM

## 2025-06-17 DIAGNOSIS — E11.21 CONTROLLED TYPE 2 DIABETES MELLITUS WITH DIABETIC NEPHROPATHY, WITHOUT LONG-TERM CURRENT USE OF INSULIN (HCC): Primary | Chronic | ICD-10-CM

## 2025-06-17 DIAGNOSIS — E78.00 PURE HYPERCHOLESTEROLEMIA: Chronic | ICD-10-CM

## 2025-06-17 DIAGNOSIS — G47.33 OBSTRUCTIVE SLEEP APNEA: ICD-10-CM

## 2025-06-17 NOTE — PROGRESS NOTES
Marc Valladares is a 59 y.o. male.    HPI: Here for complex medical visit and rash on both lower legs has been there for 3 to 4 weeks, small red bumps with no blisters and no itch  Also right upper arm pain for last 1 week, no spp injury  Takes doac  Meds, vitamins and allergies reviewed with pt    ROS: No TIA's or unusual headaches, no dysphagia.  No prolonged cough. No dyspnea or chest pain on exertion.  No abdominal pain, change in bowel habits, black or bloody stools.  No urinary tract symptoms.  No new or unusual musculoskeletal symptoms.       Prior to Visit Medications    Medication Sig Taking? Authorizing Provider   Omega-3 Fatty Acids (FISH OIL PO) Take by mouth Yes Cristina Raya MD   apixaban (ELIQUIS) 5 MG TABS tablet Take 1 tablet by mouth 2 times daily Yes Refugio Diggs PA-C   apixaban (ELIQUIS) 5 MG TABS tablet Take 1 tablet by mouth 2 times daily Yes Keyshawn Cochran MD   metoprolol succinate (TOPROL XL) 25 MG extended release tablet Take 1 tablet by mouth daily Yes Refugio Diggs PA-C   metFORMIN (GLUCOPHAGE) 500 MG tablet Take 1 tablet by mouth 2 times daily (with meals) Yes Emerita Cheng, APRN - CNP   rosuvastatin (CRESTOR) 10 MG tablet TAKE 1 TABLET BY MOUTH DAILY Yes Vincent Sigala MD   fexofenadine (ALLEGRA) 180 MG tablet Take 1 tablet by mouth daily Yes Vincent Sigala MD   lisinopril (PRINIVIL;ZESTRIL) 40 MG tablet Take 1 tablet by mouth daily Yes Vincent Sigala MD   albuterol sulfate HFA (PROVENTIL;VENTOLIN;PROAIR) 108 (90 Base) MCG/ACT inhaler Inhale 2 puffs into the lungs every 6 hours as needed for Wheezing Yes Vincent Sigala MD   omeprazole (PRILOSEC) 20 MG delayed release capsule Take 1 capsule by mouth every morning (before breakfast) Yes Vincent Sigala MD   blood glucose test strips (FREESTYLE LITE) strip test blood sugar once daily as instructed. Yes Vincent Sigala MD   acetaminophen (TYLENOL) 500 MG tablet Take 2 tablets by mouth every 6

## 2025-06-27 ENCOUNTER — TELEPHONE (OUTPATIENT)
Dept: CARDIOLOGY CLINIC | Age: 60
End: 2025-06-27

## 2025-06-30 ENCOUNTER — OFFICE VISIT (OUTPATIENT)
Dept: CARDIOLOGY CLINIC | Age: 60
End: 2025-06-30
Payer: COMMERCIAL

## 2025-06-30 ENCOUNTER — TELEPHONE (OUTPATIENT)
Dept: CARDIOLOGY CLINIC | Age: 60
End: 2025-06-30

## 2025-06-30 VITALS
HEIGHT: 68 IN | WEIGHT: 187.3 LBS | DIASTOLIC BLOOD PRESSURE: 74 MMHG | BODY MASS INDEX: 28.39 KG/M2 | HEART RATE: 85 BPM | OXYGEN SATURATION: 98 % | SYSTOLIC BLOOD PRESSURE: 120 MMHG

## 2025-06-30 DIAGNOSIS — E78.00 PURE HYPERCHOLESTEROLEMIA: ICD-10-CM

## 2025-06-30 DIAGNOSIS — I10 PRIMARY HYPERTENSION: Chronic | ICD-10-CM

## 2025-06-30 DIAGNOSIS — R94.39 ABNORMAL CARDIOVASCULAR STRESS TEST: Primary | ICD-10-CM

## 2025-06-30 DIAGNOSIS — R94.39 ABNORMAL NUCLEAR STRESS TEST: ICD-10-CM

## 2025-06-30 PROCEDURE — G2211 COMPLEX E/M VISIT ADD ON: HCPCS | Performed by: STUDENT IN AN ORGANIZED HEALTH CARE EDUCATION/TRAINING PROGRAM

## 2025-06-30 PROCEDURE — 3078F DIAST BP <80 MM HG: CPT | Performed by: STUDENT IN AN ORGANIZED HEALTH CARE EDUCATION/TRAINING PROGRAM

## 2025-06-30 PROCEDURE — 3074F SYST BP LT 130 MM HG: CPT | Performed by: STUDENT IN AN ORGANIZED HEALTH CARE EDUCATION/TRAINING PROGRAM

## 2025-06-30 PROCEDURE — 99204 OFFICE O/P NEW MOD 45 MIN: CPT | Performed by: STUDENT IN AN ORGANIZED HEALTH CARE EDUCATION/TRAINING PROGRAM

## 2025-06-30 NOTE — TELEPHONE ENCOUNTER
Spoke to [facility] pharmacy, he picked up the prescription yesterday for Eliquis for 30 days. He does have a copay card on file and just needs to wait until the 11th to have insurance cover the metoprolol.

## 2025-06-30 NOTE — TELEPHONE ENCOUNTER
----- Message from LUCRECIA SALAS RN sent at 6/30/2025  2:18 PM EDT -----  Left Heart Cath Patient Pre-procedure Instructions    · Do NOT eat or drink anything after midnight morning of procedure    · MEDICATIONS:   o Your medications have been reviewed. Please follow medication instructions below   o It is okay to drink a sip of water with meds morning of procedure   o Eliquis-do not take for 48 hours (2 days) prior to procedure    · Transportation: Bring someone to drive you home.     · Scheduling: Nalini FRAIRE is our full time procedure . She will call you to schedule your procedure.    · Allergies: Do you have an allergy to dye or contrast? No.    · Labs: We need to check blood work within 30 days of your procedure (CBC & BMP). Please go to any [facility] lab to get your labs drawn prior to your procedure.         POSSIBLY THIS WEEK

## 2025-06-30 NOTE — TELEPHONE ENCOUNTER
Spoke with patient regarding sooner appointment with Dr. Hong today. He accepted and will cancel appointment Dr. Eckert 7/10/2025.    
Spoke with patient regarding stress test results. Patient is already scheduled to see Dr. Eckert 7/10/2025. Advised patient office will attempt to secure a sooner appointment as he is planning to travel out of the [facility] for 4 weeks on 7/12/25. Will follow up with patient.   
[id number]/ [id number]

## 2025-06-30 NOTE — PROGRESS NOTES
[facility]      Cardiology Consult    Marc Valladares  1965  June 30, 2025    Referring Physician: Keyshawn Cochran MD  Reason for Referral: Abnormal Stress Test    CC: Abnormal stress    HPI:  The patient is 59 y.o. male with a past medical history significant for HTN, HLD, asthma, DM, RENA and HLD. Former smoker quit >30 years ago.    Underwent DCCV for AF 6/6/2025    Stress Test 6/6/2025 was positive for ischemia.     Today he presents as a new patient for an abnormal stress test. He does not have complaints of chest pain or shortness of breath. He states he can walk 3 miles in 45 minutes. His mother had heart problems and wore possible nitro patch. We discussed his stress test in detail.     Past Medical History:   Diagnosis Date    AR (allergic rhinitis)     Cellulitis of finger of right hand 05/24/2017    Controlled type 2 diabetes mellitus with diabetic nephropathy, without long-term current use of insulin ([facility])     Diabetes type 2, controlled ([facility]) 01/13/2014    Diabetes type 2, controlled ([facility]) 01/13/2014    Diabetes type 2, controlled ([facility]) 01/13/2014    Mild intermittent asthma without complication 12/03/2015    RENA (obstructive sleep apnea) 06/29/2017    uses CPAP    Paroxysmal atrial fibrillation ([facility]) 06/17/2025    Primary hypertension 01/19/2022    Pure hypercholesterolemia 12/03/2015    Type II or unspecified type diabetes mellitus without mention of complication, not stated as uncontrolled 01/2014     Past Surgical History:   Procedure Laterality Date    CYST INCISION AND DRAINAGE      INGUINAL HERNIA REPAIR Bilateral 1/17/14    Laproscopic, with mesh; [place]     Family History   Problem Relation Age of Onset    Diabetes Father     Diabetes Brother     Heart Attack Brother 51     Social History     Tobacco Use    Smoking status: Former     Current packs/day: 0.00     Average packs/day: 1 pack/day for 10.0 years (10.0 ttl pk-yrs)     Types: Cigarettes     Start date:

## 2025-06-30 NOTE — PATIENT INSTRUCTIONS
Left Heart Cath Patient Pre-procedure Instructions    Do NOT eat or drink anything after midnight morning of procedure    MEDICATIONS:  Your medications have been reviewed. Please follow medication instructions below  It is okay to drink a sip of water with meds morning of procedure  Eliquis-do not take for 48 hours (2 days) prior to procedure    Transportation: Bring someone to drive you home.     Scheduling: Nalini FRAIRE is our full time procedure . She will call you to schedule your procedure.    Allergies: Do you have an allergy to dye or contrast? No.    Labs: We need to check blood work within 30 days of your procedure (CBC & BMP). Please go to any [facility] lab to get your labs drawn prior to your procedure.

## 2025-06-30 NOTE — TELEPHONE ENCOUNTER
I called and spoke to Jake and he stated that he wants to wait to have this procedure until after he comes back from vacation and he is wanting it the week of August 11 th.    Date of [facility]     Time of arrival: 6:45 am     Procedure time: 8:00 am     Jake is agreeable to date and time. Reviewed instructions and he verbalized understanding. Encouraged to call with any questions or concerns.     Published on [employer], scheduled on [employer] and e-mailed to cath lab.

## 2025-07-01 NOTE — PROGRESS NOTES
[facility]   Electrophysiology Follow up   Date: 7/2/2025  I had the privilege of visiting Marc Valladares in the office.     CC: Persistent AF   HPI: Marc Valladares is a 59 y.o. male with a past medical history of HTN, asthma, DM, RENA with CPAP use, HLD. Former smoker, quit 30 years ago.     Pt was seen 5/2 at PCP and was found to be in atrial fibrillation with controlled rate. Referred to AF clinic    6/6/2025 S/p DCCV, he went back into AF 10 minutes after procedure    6/10/2025 Echo with EF of 60%, mildly dilated left atrium    6/10/2025 Stress test positive for ischemia. Completed appointment with Dr. Hong on 6/30/2025 and was recommended to proceed with C. Scheduled 8/15/2025 per patient request.     Interval History:  History of Present Illness  The patient is here for a 3-week follow-up after an unsuccessful cardioversion at the beginning of 06/2025. He has a past medical history of persistent atrial fibrillation, diabetes, and hypertension. He saw Dr. Ricardo this week for an abnormal stress test and is scheduled for a left heart catheterization in 08/2025 per his request.    He reports satisfactory energy levels and physical activity. His current medication regimen includes metoprolol 25 mg. He is planning a vacation on 07/12/2025 and is curious about the possibility of consuming wine during this period. His resting heart rate is typically around 94 to 95 beats per minute, but it can increase to between 120 and 127 beats per minute during physical exertion such as walking or climbing stairs.    MEDICATIONS  Metoprolol    Assessment & Plan      Persistent atrial fibrillation  - EKG today shows AF  - Asymptomatic  - TSH 1.34 as of 9/2024  - 6/10/2025 Echo with EF of 60%, mildly dilated left atrium  - Positive coronary calcium score in 2023  - 6/10/2025 Abnormal stress test, scheduled for C 8/15/2025 with Dr. Hong  - Drinks about 3-4 alcoholic drinks Saturday and Sunday  - High risk

## 2025-07-02 ENCOUNTER — OFFICE VISIT (OUTPATIENT)
Dept: CARDIOLOGY CLINIC | Age: 60
End: 2025-07-02
Payer: COMMERCIAL

## 2025-07-02 VITALS
HEIGHT: 68 IN | BODY MASS INDEX: 28.64 KG/M2 | WEIGHT: 189 LBS | HEART RATE: 97 BPM | SYSTOLIC BLOOD PRESSURE: 140 MMHG | DIASTOLIC BLOOD PRESSURE: 76 MMHG | OXYGEN SATURATION: 99 %

## 2025-07-02 DIAGNOSIS — R94.39 ABNORMAL CARDIOVASCULAR STRESS TEST: Primary | ICD-10-CM

## 2025-07-02 DIAGNOSIS — E66.9 OBESITY (BMI 30-39.9): ICD-10-CM

## 2025-07-02 DIAGNOSIS — I10 PRIMARY HYPERTENSION: Chronic | ICD-10-CM

## 2025-07-02 DIAGNOSIS — I48.0 PAROXYSMAL ATRIAL FIBRILLATION (HCC): ICD-10-CM

## 2025-07-02 PROCEDURE — 99214 OFFICE O/P EST MOD 30 MIN: CPT | Performed by: INTERNAL MEDICINE

## 2025-07-02 PROCEDURE — G2211 COMPLEX E/M VISIT ADD ON: HCPCS | Performed by: INTERNAL MEDICINE

## 2025-07-02 PROCEDURE — 3077F SYST BP >= 140 MM HG: CPT | Performed by: INTERNAL MEDICINE

## 2025-07-02 PROCEDURE — 93000 ELECTROCARDIOGRAM COMPLETE: CPT | Performed by: INTERNAL MEDICINE

## 2025-07-02 PROCEDURE — 3078F DIAST BP <80 MM HG: CPT | Performed by: INTERNAL MEDICINE

## 2025-07-02 RX ORDER — METOPROLOL SUCCINATE 50 MG/1
75 TABLET, EXTENDED RELEASE ORAL DAILY
Qty: 135 TABLET | Refills: 1 | Status: SHIPPED | OUTPATIENT
Start: 2025-07-02

## 2025-07-02 NOTE — PATIENT INSTRUCTIONS
Increase Toprol to 75 mg once daily    Contact office in 1 week with readings    Resting HR should be around 80 beats per minute at rest, 120 with activity

## 2025-07-07 RX ORDER — ALBUTEROL SULFATE 90 UG/1
2 INHALANT RESPIRATORY (INHALATION) EVERY 6 HOURS PRN
Qty: 18 G | Refills: 5 | Status: SHIPPED | OUTPATIENT
Start: 2025-07-07

## 2025-07-10 ENCOUNTER — OFFICE VISIT (OUTPATIENT)
Dept: FAMILY MEDICINE CLINIC | Age: 60
End: 2025-07-10

## 2025-07-10 VITALS
DIASTOLIC BLOOD PRESSURE: 84 MMHG | WEIGHT: 187.2 LBS | SYSTOLIC BLOOD PRESSURE: 122 MMHG | BODY MASS INDEX: 28.47 KG/M2 | HEART RATE: 97 BPM | OXYGEN SATURATION: 97 %

## 2025-07-10 DIAGNOSIS — J02.9 SORE THROAT: Primary | ICD-10-CM

## 2025-07-10 DIAGNOSIS — K12.0 CANKER SORE: ICD-10-CM

## 2025-07-10 LAB — S PYO AG THROAT QL: NORMAL

## 2025-07-10 RX ORDER — OMEPRAZOLE 20 MG/1
20 CAPSULE, DELAYED RELEASE ORAL
Qty: 90 CAPSULE | Refills: 3 | Status: SHIPPED | OUTPATIENT
Start: 2025-07-10

## 2025-07-10 RX ORDER — AZITHROMYCIN 250 MG/1
250 TABLET, FILM COATED ORAL SEE ADMIN INSTRUCTIONS
Qty: 6 TABLET | Refills: 0 | Status: SHIPPED | OUTPATIENT
Start: 2025-07-10 | End: 2025-07-15

## 2025-07-10 ASSESSMENT — PATIENT HEALTH QUESTIONNAIRE - PHQ9
SUM OF ALL RESPONSES TO PHQ QUESTIONS 1-9: 0
2. FEELING DOWN, DEPRESSED OR HOPELESS: NOT AT ALL
1. LITTLE INTEREST OR PLEASURE IN DOING THINGS: NOT AT ALL
SUM OF ALL RESPONSES TO PHQ QUESTIONS 1-9: 0

## 2025-07-10 ASSESSMENT — ENCOUNTER SYMPTOMS
RHINORRHEA: 0
COUGH: 0
SORE THROAT: 1

## 2025-07-10 NOTE — PROGRESS NOTES
SUBJECTIVE:  Pt is a of 59 y.o. male comes in today with   Chief Complaint   Patient presents with    Pharyngitis     Pain on left side of throat          C/o pain to left side of throat.       Pharyngitis  This is a new problem. The current episode started in the past 7 days (4 days ago). The problem has been waxing and waning (mild pain in morning, moderate pain in evening.). Associated symptoms include a sore throat (left sided). Pertinent negatives include no congestion, coughing or fever.       Prior to Visit Medications    Medication Sig Taking? Authorizing Provider   omeprazole (PRILOSEC) 20 MG delayed release capsule Take 1 capsule by mouth every morning (before breakfast) Yes Emerita Cheng APRN - CNP   azithromycin (ZITHROMAX) 250 MG tablet Take 1 tablet by mouth See Admin Instructions for 5 days 500mg on day 1 followed by 250mg on days 2 - 5 Yes Emerita Cheng APRN - CNP   albuterol sulfate HFA (PROVENTIL;VENTOLIN;PROAIR) 108 (90 Base) MCG/ACT inhaler Inhale 2 puffs into the lungs every 6 hours as needed for Wheezing Yes Vincent Sigala MD   metoprolol succinate (TOPROL XL) 50 MG extended release tablet Take 1.5 tablets by mouth daily Yes Keyshawn Cochran MD   Omega-3 Fatty Acids (FISH OIL PO) Take by mouth Yes ProviderCristina MD   apixaban (ELIQUIS) 5 MG TABS tablet Take 1 tablet by mouth 2 times daily Yes Refugio Diggs PA-C   metFORMIN (GLUCOPHAGE) 500 MG tablet Take 1 tablet by mouth 2 times daily (with meals) Yes Emerita Cheng APRN - CNP   rosuvastatin (CRESTOR) 10 MG tablet TAKE 1 TABLET BY MOUTH DAILY Yes Vincent Sigala MD   fexofenadine (ALLEGRA) 180 MG tablet Take 1 tablet by mouth daily Yes Vincent Sigala MD   lisinopril (PRINIVIL;ZESTRIL) 40 MG tablet Take 1 tablet by mouth daily Yes Vincent Sigala MD   blood glucose test strips (FREESTYLE LITE) strip test blood sugar once daily as instructed. Yes Vincent Sigala MD   acetaminophen (TYLENOL) 500 MG tablet

## 2025-07-11 ENCOUNTER — TELEPHONE (OUTPATIENT)
Dept: CARDIOLOGY CLINIC | Age: 60
End: 2025-07-11

## 2025-07-11 NOTE — TELEPHONE ENCOUNTER
This is just an FYI from PT,  he has been keeping tack of his HR its just letting us know his HR has been within normal range and he is doing good. He has F/U apts when comes back from his trip.

## 2025-07-11 NOTE — TELEPHONE ENCOUNTER
Patient is calling about HR. Per last office visit with MXA HR Should be in the 80's while resting and 120's with activity. He is concerned that resting HR is around high 70's and 108 highest while up and moving around and then back to 70's when resting. Patient is due to go out of country tomorrow. Please advise.

## 2025-07-13 LAB — BACTERIA THROAT AEROBE CULT: NORMAL

## 2025-08-11 ENCOUNTER — TELEPHONE (OUTPATIENT)
Dept: CARDIOLOGY CLINIC | Age: 60
End: 2025-08-11

## 2025-08-15 ENCOUNTER — HOSPITAL ENCOUNTER (OUTPATIENT)
Age: 60
Setting detail: OUTPATIENT SURGERY
Discharge: HOME | End: 2025-08-15
Attending: STUDENT IN AN ORGANIZED HEALTH CARE EDUCATION/TRAINING PROGRAM | Admitting: STUDENT IN AN ORGANIZED HEALTH CARE EDUCATION/TRAINING PROGRAM
Payer: COMMERCIAL

## 2025-08-15 VITALS
DIASTOLIC BLOOD PRESSURE: 73 MMHG | TEMPERATURE: 98.2 F | SYSTOLIC BLOOD PRESSURE: 103 MMHG | WEIGHT: 189 LBS | HEART RATE: 88 BPM | OXYGEN SATURATION: 99 % | BODY MASS INDEX: 29.66 KG/M2 | RESPIRATION RATE: 16 BRPM | HEIGHT: 67 IN

## 2025-08-15 DIAGNOSIS — R94.39 ABNORMAL NUCLEAR STRESS TEST: ICD-10-CM

## 2025-08-15 LAB
ANION GAP SERPL CALCULATED.3IONS-SCNC: 12 MMOL/L (ref 3–16)
BUN SERPL-MCNC: 13 MG/DL (ref 7–20)
CALCIUM SERPL-MCNC: 9.2 MG/DL (ref 8.3–10.6)
CHLORIDE SERPL-SCNC: 105 MMOL/L (ref 99–110)
CO2 SERPL-SCNC: 22 MMOL/L (ref 21–32)
CREAT SERPL-MCNC: 0.8 MG/DL (ref 0.9–1.3)
DEPRECATED RDW RBC AUTO: 13.5 % (ref 12.4–15.4)
ECHO BSA: 2.01 M2
EKG DIAGNOSIS: NORMAL
EKG Q-T INTERVAL: 382 MS
EKG QRS DURATION: 88 MS
EKG QTC CALCULATION (BAZETT): 418 MS
EKG R AXIS: 54 DEGREES
EKG T AXIS: 22 DEGREES
EKG VENTRICULAR RATE: 72 BPM
GFR SERPLBLD CREATININE-BSD FMLA CKD-EPI: >90 ML/MIN/{1.73_M2}
GLUCOSE SERPL-MCNC: 137 MG/DL (ref 70–99)
HCT VFR BLD AUTO: 41.1 % (ref 40.5–52.5)
HGB BLD-MCNC: 14.3 G/DL (ref 13.5–17.5)
MCH RBC QN AUTO: 31.3 PG (ref 26–34)
MCHC RBC AUTO-ENTMCNC: 34.9 G/DL (ref 31–36)
MCV RBC AUTO: 89.8 FL (ref 80–100)
PLATELET # BLD AUTO: 166 K/UL (ref 135–450)
PMV BLD AUTO: 8 FL (ref 5–10.5)
POTASSIUM SERPL-SCNC: 3.8 MMOL/L (ref 3.5–5.1)
RBC # BLD AUTO: 4.57 M/UL (ref 4.2–5.9)
SODIUM SERPL-SCNC: 139 MMOL/L (ref 136–145)
WBC # BLD AUTO: 6.5 K/UL (ref 4–11)

## 2025-08-15 PROCEDURE — 93005 ELECTROCARDIOGRAM TRACING: CPT

## 2025-08-15 PROCEDURE — 99152 MOD SED SAME PHYS/QHP 5/>YRS: CPT | Performed by: STUDENT IN AN ORGANIZED HEALTH CARE EDUCATION/TRAINING PROGRAM

## 2025-08-15 PROCEDURE — 7100000010 HC PHASE II RECOVERY - FIRST 15 MIN: Performed by: STUDENT IN AN ORGANIZED HEALTH CARE EDUCATION/TRAINING PROGRAM

## 2025-08-15 PROCEDURE — 80048 BASIC METABOLIC PNL TOTAL CA: CPT

## 2025-08-15 PROCEDURE — 2500000003 HC RX 250 WO HCPCS: Performed by: STUDENT IN AN ORGANIZED HEALTH CARE EDUCATION/TRAINING PROGRAM

## 2025-08-15 PROCEDURE — C1894 INTRO/SHEATH, NON-LASER: HCPCS | Performed by: STUDENT IN AN ORGANIZED HEALTH CARE EDUCATION/TRAINING PROGRAM

## 2025-08-15 PROCEDURE — 85027 COMPLETE CBC AUTOMATED: CPT

## 2025-08-15 PROCEDURE — 7100000011 HC PHASE II RECOVERY - ADDTL 15 MIN: Performed by: STUDENT IN AN ORGANIZED HEALTH CARE EDUCATION/TRAINING PROGRAM

## 2025-08-15 PROCEDURE — 2709999900 HC NON-CHARGEABLE SUPPLY: Performed by: STUDENT IN AN ORGANIZED HEALTH CARE EDUCATION/TRAINING PROGRAM

## 2025-08-15 PROCEDURE — 93458 L HRT ARTERY/VENTRICLE ANGIO: CPT | Performed by: STUDENT IN AN ORGANIZED HEALTH CARE EDUCATION/TRAINING PROGRAM

## 2025-08-15 PROCEDURE — 6360000002 HC RX W HCPCS: Performed by: STUDENT IN AN ORGANIZED HEALTH CARE EDUCATION/TRAINING PROGRAM

## 2025-08-15 PROCEDURE — 6360000004 HC RX CONTRAST MEDICATION: Performed by: STUDENT IN AN ORGANIZED HEALTH CARE EDUCATION/TRAINING PROGRAM

## 2025-08-15 PROCEDURE — 36415 COLL VENOUS BLD VENIPUNCTURE: CPT

## 2025-08-15 PROCEDURE — C1769 GUIDE WIRE: HCPCS | Performed by: STUDENT IN AN ORGANIZED HEALTH CARE EDUCATION/TRAINING PROGRAM

## 2025-08-15 RX ORDER — LIDOCAINE HYDROCHLORIDE 10 MG/ML
INJECTION, SOLUTION INFILTRATION; PERINEURAL PRN
Status: DISCONTINUED | OUTPATIENT
Start: 2025-08-15 | End: 2025-08-15 | Stop reason: HOSPADM

## 2025-08-15 RX ORDER — SODIUM CHLORIDE 9 MG/ML
INJECTION, SOLUTION INTRAVENOUS PRN
Status: DISCONTINUED | OUTPATIENT
Start: 2025-08-15 | End: 2025-08-15 | Stop reason: HOSPADM

## 2025-08-15 RX ORDER — SODIUM CHLORIDE 0.9 % (FLUSH) 0.9 %
5-40 SYRINGE (ML) INJECTION EVERY 12 HOURS SCHEDULED
Status: DISCONTINUED | OUTPATIENT
Start: 2025-08-15 | End: 2025-08-15 | Stop reason: HOSPADM

## 2025-08-15 RX ORDER — IOPAMIDOL 755 MG/ML
INJECTION, SOLUTION INTRAVASCULAR PRN
Status: DISCONTINUED | OUTPATIENT
Start: 2025-08-15 | End: 2025-08-15 | Stop reason: HOSPADM

## 2025-08-15 RX ORDER — MIDAZOLAM HYDROCHLORIDE 1 MG/ML
INJECTION, SOLUTION INTRAMUSCULAR; INTRAVENOUS PRN
Status: DISCONTINUED | OUTPATIENT
Start: 2025-08-15 | End: 2025-08-15 | Stop reason: HOSPADM

## 2025-08-15 RX ORDER — FENTANYL CITRATE 50 UG/ML
INJECTION, SOLUTION INTRAMUSCULAR; INTRAVENOUS PRN
Status: DISCONTINUED | OUTPATIENT
Start: 2025-08-15 | End: 2025-08-15 | Stop reason: HOSPADM

## 2025-08-15 RX ORDER — SODIUM CHLORIDE 0.9 % (FLUSH) 0.9 %
5-40 SYRINGE (ML) INJECTION PRN
Status: DISCONTINUED | OUTPATIENT
Start: 2025-08-15 | End: 2025-08-15 | Stop reason: HOSPADM

## 2025-08-15 ASSESSMENT — PAIN SCALES - GENERAL: PAINLEVEL_OUTOF10: 0

## (undated) DEVICE — DRAPE BRACH ANGIO W38XL44IN POLYPR SMS FAB

## (undated) DEVICE — CANNULA NSL W/ O2 DEL AD 4 M

## (undated) DEVICE — KIT INTRO 6FR L10CM MINI WIRE L45CM DIA0.021IN NDL 21GA ANT

## (undated) DEVICE — GUIDEWIRE VASC L260CM DIA0.035IN RAD 3MM J TIP L7CM PTFE

## (undated) DEVICE — PAD DEFIB AD RADIOTRANS PHY

## (undated) DEVICE — KIT AT-X65 ANGIOTOUCH HAND CONTROLLER

## (undated) DEVICE — DEVICE COMPR L24CM REG RAD W/ INFL TR BND

## (undated) DEVICE — Device

## (undated) DEVICE — CATHETER ANGIO 5FR L110CM COR NYL POLYUR S STL RAD TIGER 4